# Patient Record
Sex: FEMALE | Race: AMERICAN INDIAN OR ALASKA NATIVE | NOT HISPANIC OR LATINO | Employment: FULL TIME | URBAN - METROPOLITAN AREA
[De-identification: names, ages, dates, MRNs, and addresses within clinical notes are randomized per-mention and may not be internally consistent; named-entity substitution may affect disease eponyms.]

---

## 2017-01-30 ENCOUNTER — HOSPITAL ENCOUNTER (EMERGENCY)
Facility: CLINIC | Age: 37
Discharge: HOME | End: 2017-01-30
Attending: EMERGENCY MEDICINE | Admitting: EMERGENCY MEDICINE
Payer: COMMERCIAL

## 2017-01-30 VITALS
HEART RATE: 71 BPM | DIASTOLIC BLOOD PRESSURE: 98 MMHG | TEMPERATURE: 97.8 F | SYSTOLIC BLOOD PRESSURE: 137 MMHG | RESPIRATION RATE: 16 BRPM | OXYGEN SATURATION: 97 %

## 2017-01-30 DIAGNOSIS — S05.8X1A SUPERFICIAL INJURY OF CORNEA, RIGHT, INITIAL ENCOUNTER: ICD-10-CM

## 2017-01-30 PROCEDURE — 99283 EMERGENCY DEPT VISIT LOW MDM: CPT | Performed by: EMERGENCY MEDICINE

## 2017-01-30 PROCEDURE — 25000125 ZZHC RX 250

## 2017-01-30 PROCEDURE — 25000132 ZZH RX MED GY IP 250 OP 250 PS 637: Performed by: EMERGENCY MEDICINE

## 2017-01-30 PROCEDURE — 99283 EMERGENCY DEPT VISIT LOW MDM: CPT | Mod: Z6 | Performed by: EMERGENCY MEDICINE

## 2017-01-30 RX ORDER — PROPARACAINE HYDROCHLORIDE 5 MG/ML
SOLUTION/ DROPS OPHTHALMIC
Status: COMPLETED
Start: 2017-01-30 | End: 2017-01-30

## 2017-01-30 RX ORDER — HYDROCODONE BITARTRATE AND ACETAMINOPHEN 5; 325 MG/1; MG/1
1-2 TABLET ORAL EVERY 4 HOURS PRN
Qty: 15 TABLET | Refills: 0 | Status: SHIPPED | OUTPATIENT
Start: 2017-01-30 | End: 2017-08-11

## 2017-01-30 RX ORDER — IBUPROFEN 600 MG/1
600 TABLET, FILM COATED ORAL EVERY 6 HOURS PRN
Qty: 20 TABLET | Refills: 0 | Status: SHIPPED | OUTPATIENT
Start: 2017-01-30 | End: 2017-12-19

## 2017-01-30 RX ORDER — HYDROCODONE BITARTRATE AND ACETAMINOPHEN 5; 325 MG/1; MG/1
2 TABLET ORAL ONCE
Status: COMPLETED | OUTPATIENT
Start: 2017-01-30 | End: 2017-01-30

## 2017-01-30 RX ORDER — NEOMYCIN SULFATE, POLYMYXIN B SULFATE, BACITRACIN ZINC, HYDROCORTISONE 3.5; 10000; 400; 1 MG/G; [USP'U]/G; [USP'U]/G; MG/G
OINTMENT OPHTHALMIC 3 TIMES DAILY
Qty: 10 G | Refills: 0 | Status: SHIPPED | OUTPATIENT
Start: 2017-01-30 | End: 2017-04-06

## 2017-01-30 RX ORDER — IBUPROFEN 600 MG/1
600 TABLET, FILM COATED ORAL ONCE
Status: COMPLETED | OUTPATIENT
Start: 2017-01-30 | End: 2017-01-30

## 2017-01-30 RX ADMIN — IBUPROFEN 600 MG: 600 TABLET ORAL at 12:53

## 2017-01-30 RX ADMIN — FLUORESCEIN SODIUM: 1 STRIP OPHTHALMIC at 08:48

## 2017-01-30 RX ADMIN — HYDROCODONE BITARTRATE AND ACETAMINOPHEN 2 TABLET: 5; 325 TABLET ORAL at 09:42

## 2017-01-30 RX ADMIN — PROPARACAINE HYDROCHLORIDE: 5 SOLUTION/ DROPS OPHTHALMIC at 08:48

## 2017-01-30 ASSESSMENT — ENCOUNTER SYMPTOMS
DIFFICULTY URINATING: 0
ABDOMINAL PAIN: 0
EYE REDNESS: 1
SHORTNESS OF BREATH: 0
FEVER: 0
ARTHRALGIAS: 0
NECK STIFFNESS: 0
HEADACHES: 0
EYE PAIN: 1
CONFUSION: 0
COLOR CHANGE: 0

## 2017-01-30 ASSESSMENT — VISUAL ACUITY
OS: 20/15
OD: 20/70

## 2017-01-30 NOTE — ED AVS SNAPSHOT
[facility], Emergency Department    [address]    [facility] [address]    Phone:  [phone]    Fax:  [phone]                                       Eliana Anders   MRN: [id number]    Department:  [facility], Emergency Department   Date of Visit:  1/30/2017           Patient Information     Date Of Birth          1980        Your diagnoses for this visit were:     Superficial injury of cornea, right, initial encounter        You were seen by Darryn Dominguez MD.        Discharge Instructions         Corneal Injury  An eye injury can hurt your cornea. Your cornea is the clear layer on the front of your eye. It protects your eye from dust and germs, and helps filter out harmful UV (ultraviolet) rays. The cornea also helps to focus light entering your eye. Your cornea is made of strong proteins, but it can be damaged. A slight cut or scratch (abrasion) to the cornea can be very painful. But that is often minor and can heal within 1 or 2 days. A bad abrasion or a hole (puncture) in the cornea can be very serious. These are medical emergencies.     A blow to the eye can cause damage to the cornea (front layer of the eye).   Something in your eye  If you think you have something small in your eye, flush it with water right away. Pull your upper lid out and over your bottom lid. This will help increase the flow of tears across your eye. If these methods don t work, call your healthcare provider. Never try to remove an object from your eye that doesn t flush out easily with water. Doing so may cause more damage.  When to go to the emergency room (ER)  Call 911 or your local emergency number if you have:    Severe eye pain    A puncture injury or bad abrasion    Something in your eye that you can t flush out with water    A very swollen or painful eye after removing an object    A chemical burn    An object embedded in your eye. (Cover both eyes with a sterile compress and keep both  eyes closed while you wait for help. DO NOT put any pressure on your eyes.)  What to expect in the ER  For minor abrasions   Minor abrasions are usually treated with eye drops or ointment. You may be given antibiotics to prevent infection. Most abrasions heal in 1 or 2 days. To help rule out more serious injuries, you may have tests including:    A standard eye exam to check how well you can see    A Eric test, which uses a special dye to look for severe eye damage  Depending on the results of these tests, you may be referred to an eye specialist (ophthalmologist).  For serious abrasions or punctures  You will be referred directly to an ophthalmologist for emergency treatment. An eye specialist is needed to reduce further damage and possible vision loss.    3961-2305 [employer]. [address] [place] [address]. All rights reserved. This information is not intended as a substitute for professional medical care. Always follow your healthcare professional's instructions.      Please make an appointment to follow up with Eye Clinic (phone: [phone]) in next days even if entirely better.      Future Appointments        Provider Department Dept Phone Center    1/31/2017 8:20 AM José Self [facility] Ophthalmology [phone] [facility]      24 Hour Appointment Hotline       To make an appointment at any [facility], call [phone] ([phone]). If you don't have a family doctor or clinic, we will help you find one. [facility] clinics are conveniently located to serve the needs of you and your family.             Review of your medicines      START taking        Dose / Directions Last dose taken    ibuprofen 600 MG tablet   Commonly known as:  ADVIL/MOTRIN   Dose:  600 mg   Quantity:  20 tablet        Take 1 tablet (600 mg) by mouth every 6 hours as needed for pain   Refills:  0        neomycin-bacitracin-polymyxin-hydrocortisone 1 % ophthalmic ointment   Commonly known as:   CORTISPORIN   Quantity:  10 g        Place into the right eye 3 times daily   Refills:  0          Our records show that you are taking the medicines listed below. If these are incorrect, please call your family doctor or clinic.        Dose / Directions Last dose taken    ALEVE PO        Take by mouth daily as needed for moderate pain   Refills:  0        HYDROcodone-acetaminophen 5-325 MG per tablet   Commonly known as:  NORCO   Dose:  1-2 tablet   Quantity:  15 tablet        Take 1-2 tablets by mouth every 4 hours as needed for moderate to severe pain   Refills:  0        MIRENA (52 MG) 20 MCG/24HR IUD   Quantity:  1 Intra Uterine Device   Generic drug:  levonorgestrel        intrauterine uterine device placed   Refills:  0        traMADol 50 MG tablet   Commonly known as:  ULTRAM   Dose:  50 mg   Quantity:  60 tablet        Take 1 tablet (50 mg) by mouth every 6 hours as needed for moderate pain   Refills:  0                Prescriptions were sent or printed at these locations (3 Prescriptions)                   Other Prescriptions                Printed at Department/Unit printer (3 of 3)         neomycin-bacitracin-polymyxin-hydrocortisone (CORTISPORIN) 1 % ophthalmic ointment               HYDROcodone-acetaminophen (NORCO) 5-325 MG per tablet               ibuprofen (ADVIL/MOTRIN) 600 MG tablet                Orders Needing Specimen Collection     None      Pending Results     No orders found from 1/29/2017 to 1/31/2017.            Pending Culture Results     No orders found from 1/29/2017 to 1/31/2017.            Thank you for choosing [facility]       Thank you for choosing [facility] for your care. Our goal is always to provide you with excellent care. Hearing back from our patients is one way we can continue to improve our services. Please take a few minutes to complete the written survey that you may receive in the mail after you visit with us. Thank you!        [employer] Information     [employer] gives you  secure access to your electronic health record. If you see a primary care provider, you can also send messages to your care team and make appointments. If you have questions, please call your primary care clinic.  If you do not have a primary care provider, please call [phone] and they will assist you.        Care EveryWhere ID     This is your Care EveryWhere ID. This could be used by other organizations to access your [facility] medical records  [id number]        After Visit Summary       This is your record. Keep this with you and show to your community pharmacist(s) and doctor(s) at your next visit.

## 2017-01-30 NOTE — ED AVS SNAPSHOT
[facility] [place], Emergency Department    [address]    [facility] [address]    Phone:  [phone]    Fax:  [phone]                                       Eliana Anders   MRN: [id number]    Department:  [facility], Emergency Department   Date of Visit:  1/30/2017           After Visit Summary Signature Page     I have received my discharge instructions, and my questions have been answered. I have discussed any challenges I see with this plan with the nurse or doctor.    ..........................................................................................................................................  Patient/Patient Representative Signature      ..........................................................................................................................................  Patient Representative Print Name and Relationship to Patient    ..................................................               ................................................  Date                                            Time    ..........................................................................................................................................  Reviewed by Signature/Title    ...................................................              ..............................................  Date                                                            Time

## 2017-01-30 NOTE — ED PROVIDER NOTES
History     Chief Complaint   Patient presents with     Eye Pain     Woke up this am with severe R eye pain.  Eye is dry and swollen.  Pt is crying     HPI  Eliana Anders is a 36 year old female who presents for evaluation of severe right eye pain.  Patient states that she woke this morning with right eye pain.  She tried over-the-counter eye redness reducing drops made by [employer] without relief.  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  She is unaware if she had any type of eye exposure to chemicals.  She states that she does not wear contact lens and she has slight decrease in visual acuity out of this eye.  She denies previous ocular history or other ongoing symptoms.    I have reviewed the Medications, Allergies, Past Medical and Surgical History, and Social History in the Epic system.    Review of Systems   Constitutional: Negative for fever.   HENT: Negative for congestion.    Eyes: Positive for pain, redness and visual disturbance.   Respiratory: Negative for shortness of breath.    Cardiovascular: Negative for chest pain.   Gastrointestinal: Negative for abdominal pain.   Genitourinary: Negative for difficulty urinating.   Musculoskeletal: Negative for arthralgias and neck stiffness.   Skin: Negative for color change.   Neurological: Negative for headaches.   Psychiatric/Behavioral: Negative for confusion.       Physical Exam   BP: (!) 157/115 mmHg  Pulse: 75  Temp: 97.8  F (36.6  C)  Resp: 16  SpO2: 99 %  Physical Exam   Eyes: Right conjunctiva is injected. Right conjunctiva has no hemorrhage. Right eye exhibits normal extraocular motion and no nystagmus. Right pupil is round and reactive.   Slit lamp exam:       The right eye shows fluorescein uptake. The right eye shows no anterior chamber bulge.        The left eye shows no fluorescein uptake.           ED Course     Procedures        Proparacaine and fluorescein was  used  Labs Ordered and Resulted from Time of ED Arrival Up to the Time of Departure from the ED - No data to display    Assessments & Plan (with Medical Decision Making)   36-year-old female presents for evaluation right eye pain.  She is found to have a corneal defect which includes the visual axis in the right eye.  Significant pain relief was obtained using proparacaine.  The eye was copiously irrigated and the case was discussed with ophthalmology.  The patient will follow-up in their clinic in the next 24 hours for further evaluation and management.  Patient was given Vicodin in the emergency room as well as a prescription for this and and anti-biotic ointment to be used 3 times a day.  She was also instructed to avoid touching or rubbing her eye.    I have reviewed the nursing notes.    I have reviewed the findings, diagnosis, plan and need for follow up with the patient.    New Prescriptions    NEOMYCIN-BACITRACIN-POLYMYXIN-HYDROCORTISONE (CORTISPORIN) 1 % OPHTHALMIC OINTMENT    Place into the right eye 3 times daily       Final diagnoses:   Superficial injury of cornea, right, initial encounter       1/30/2017   [facility], [place], EMERGENCY DEPARTMENT      Darryn Dominguez MD  01/30/17 2714

## 2017-01-30 NOTE — DISCHARGE INSTRUCTIONS
Corneal Injury  An eye injury can hurt your cornea. Your cornea is the clear layer on the front of your eye. It protects your eye from dust and germs, and helps filter out harmful UV (ultraviolet) rays. The cornea also helps to focus light entering your eye. Your cornea is made of strong proteins, but it can be damaged. A slight cut or scratch (abrasion) to the cornea can be very painful. But that is often minor and can heal within 1 or 2 days. A bad abrasion or a hole (puncture) in the cornea can be very serious. These are medical emergencies.     A blow to the eye can cause damage to the cornea (front layer of the eye).   Something in your eye  If you think you have something small in your eye, flush it with water right away. Pull your upper lid out and over your bottom lid. This will help increase the flow of tears across your eye. If these methods don t work, call your healthcare provider. Never try to remove an object from your eye that doesn t flush out easily with water. Doing so may cause more damage.  When to go to the emergency room (ER)  Call 911 or your local emergency number if you have:    Severe eye pain    A puncture injury or bad abrasion    Something in your eye that you can t flush out with water    A very swollen or painful eye after removing an object    A chemical burn    An object embedded in your eye. (Cover both eyes with a sterile compress and keep both eyes closed while you wait for help. DO NOT put any pressure on your eyes.)  What to expect in the ER  For minor abrasions   Minor abrasions are usually treated with eye drops or ointment. You may be given antibiotics to prevent infection. Most abrasions heal in 1 or 2 days. To help rule out more serious injuries, you may have tests including:    A standard eye exam to check how well you can see    A Eric test, which uses a special dye to look for severe eye damage  Depending on the results of these tests, you may be referred to an eye  specialist (ophthalmologist).  For serious abrasions or punctures  You will be referred directly to an ophthalmologist for emergency treatment. An eye specialist is needed to reduce further damage and possible vision loss.    8017-9961 [employer]. [address] [place] [address], [place] [address]. All rights reserved. This information is not intended as a substitute for professional medical care. Always follow your healthcare professional's instructions.      Please make an appointment to follow up with Eye Clinic (phone: [phone]) in next days even if entirely better.

## 2017-01-31 ENCOUNTER — OFFICE VISIT (OUTPATIENT)
Dept: OPHTHALMOLOGY | Facility: CLINIC | Age: 37
End: 2017-01-31

## 2017-01-31 DIAGNOSIS — H18.831 RECURRENT EROSION OF CORNEA, RIGHT: Primary | ICD-10-CM

## 2017-01-31 RX ORDER — SODIUM CHLORIDE 5 %
OINTMENT (GRAM) OPHTHALMIC (EYE)
Qty: 1 TUBE | Refills: 1 | Status: SHIPPED | OUTPATIENT
Start: 2017-01-31 | End: 2017-04-06

## 2017-01-31 ASSESSMENT — CONF VISUAL FIELD
OS_NORMAL: 1
OD_NORMAL: 1
METHOD: COUNTING FINGERS

## 2017-01-31 ASSESSMENT — EXTERNAL EXAM - LEFT EYE: OS_EXAM: NORMAL

## 2017-01-31 ASSESSMENT — VISUAL ACUITY
OD_SC+: -3
METHOD: SNELLEN - LINEAR
OD_SC: 20/20
OS_SC: 20/20

## 2017-01-31 ASSESSMENT — TONOMETRY
IOP_METHOD: ICARE
OS_IOP_MMHG: 13
OD_IOP_MMHG: 10

## 2017-01-31 ASSESSMENT — SLIT LAMP EXAM - LIDS
COMMENTS: NORMAL
COMMENTS: NORMAL

## 2017-01-31 ASSESSMENT — CUP TO DISC RATIO
OD_RATIO: 0.3
OS_RATIO: 0.3

## 2017-01-31 ASSESSMENT — EXTERNAL EXAM - RIGHT EYE: OD_EXAM: NORMAL

## 2017-01-31 NOTE — MR AVS SNAPSHOT
After Visit Summary   1/31/2017    Eliana Anders    MRN: [id number]           Patient Information     Date Of Birth          1980        Visit Information        Provider Department      1/31/2017 8:20 AM José Self OD [facility] Ophthalmology        Today's Diagnoses     Recurrent erosion of cornea, right    -  1        Follow-ups after your visit        Follow-up notes from your care team     Return in about 3 days (around 2/3/2017) for Follow Up.      Your next 10 appointments already scheduled     Feb 03, 2017  1:20 PM   (Arrive by 1:05 PM)   RETURN GENERAL with SIMON Wheat [facility] Ophthalmology ([facility])    [address] [facility] [address] [phone]              Who to contact     Please call your clinic at [phone] to:    Ask questions about your health    Make or cancel appointments    Discuss your medicines    Learn about your test results    Speak to your doctor   If you have compliments or concerns about an experience at your clinic, or if you wish to file a complaint, please contact [facility] Patient Relations at [phone] or email us at [email or web address]         Additional Information About Your Visit        MyChart Information     [employer] gives you secure access to your electronic health record. If you see a primary care provider, you can also send messages to your care team and make appointments. If you have questions, please call your primary care clinic.  If you do not have a primary care provider, please call [phone] and they will assist you.      [employer] is an electronic gateway that provides easy, online access to your medical records. With [employer], you can request a clinic appointment, read your test results, renew a prescription or communicate with your care team.     To access your existing account, please contact your [facility] or call [phone] for assistance.        Care EveryWhere ID     This is your Care EveryWhere ID. This could be used by other organizations to access your [facility] medical records  [id number]         Blood Pressure from Last 3 Encounters:   01/30/17 137/98   11/29/16 139/89   11/26/16 133/83    Weight from Last 3 Encounters:   11/29/16 95.255 kg (210 lb)   04/15/16 98.158 kg (216 lb 6.4 oz)   11/24/15 95.255 kg (210 lb)              Today, you had the following     No orders found for display         Today's Medication Changes          These changes are accurate as of: 1/31/17  4:19 PM.  If you have any questions, ask your nurse or doctor.               Start taking these medicines.        Dose/Directions    sodium chloride 5 % ophthalmic ointment   Commonly known as:  NATE 128   Used for:  Recurrent erosion of cornea, right   Started by:  José Self, OD        Apply 1/4 inch strip to lower lid of the right eye at bedtime each night   Quantity:  1 Tube   Refills:  1            Where to get your medicines      These medications were sent to [facility] - [address] AT [address] [address]     Phone:  [phone]    - sodium chloride 5 % ophthalmic ointment             Primary Care Provider Office Phone # Fax #    [facility] [phone]       [address] [address]        Thank you!     Thank you for choosing [facility] OPHTHALMOLOGY  for your care. Our goal is always to provide you with excellent care. Hearing back from our patients is one way we can continue to improve our services. Please take a few minutes to complete the written survey that you may receive in the mail after your visit with us. Thank you!             Your Updated Medication List - Protect others around you: Learn how to safely use, store and throw away your medicines at  www.disposemymeds.org.          This list is accurate as of: 1/31/17  4:19 PM.  Always use your most recent med list.                   Brand Name Dispense Instructions for use    ALEVE PO      Take by mouth daily as needed for moderate pain       HYDROcodone-acetaminophen 5-325 MG per tablet    NORCO    15 tablet    Take 1-2 tablets by mouth every 4 hours as needed for moderate to severe pain       ibuprofen 600 MG tablet    ADVIL/MOTRIN    20 tablet    Take 1 tablet (600 mg) by mouth every 6 hours as needed for pain       MIRENA (52 MG) 20 MCG/24HR IUD   Generic drug:  levonorgestrel     1 Intra Uterine Device    intrauterine uterine device placed       neomycin-bacitracin-polymyxin-hydrocortisone 1 % ophthalmic ointment    CORTISPORIN    10 g    Place into the right eye 3 times daily       sodium chloride 5 % ophthalmic ointment    NATE 128    1 Tube    Apply 1/4 inch strip to lower lid of the right eye at bedtime each night       traMADol 50 MG tablet    ULTRAM    60 tablet    Take 1 tablet (50 mg) by mouth every 6 hours as needed for moderate pain

## 2017-01-31 NOTE — NURSING NOTE
Chief Complaints and History of Present Illnesses   Patient presents with     Consult For     Right eye corneal abrasion     HPI    Affected eye(s):  Right   Symptoms:     Blurred vision      Frequency:  Constant       Do you have eye pain now?:  No      Comments:  Pt states woke up with pain on the right eye yesterday- unable to open right eye because of the pain yesterday. Pt currently has a patch over the right eye today. Was prescribed Cortisporin to be used 3x/ day in the right eye.     Roma NICHOLAS 8:18 AM January 31, 2017

## 2017-01-31 NOTE — PROGRESS NOTES
Assessment/Plan  1. 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 Given history of ocular trauma, it is possible that there is a history of corneal abrasion. Discontinue antibiotic/steroid drop as defect is closed. Prescribed Mani 128 jennifer qhs OD, and recommended preservative free tears q1h OD. Return to clinic in 3 days to check for resolution and rule out herpetic keratitis, given branching appearance of defect.    Complete documentation of historical and exam elements from today's encounter can  be found in the full encounter summary report (not reduplicated in this progress  note). I personally obtained the chief complaint(s) and history of present illness. I  confirmed and edited as necessary the review of systems, past medical/surgical  history, family history, social history, and examination findings as documented by  others; and I examined the patient myself. I personally reviewed the relevant tests,  images, and reports as documented above. I formulated and edited as necessary the  assessment and plan and discussed the findings and management plan with the  patient and family.    José Self, OD, FAAO

## 2017-02-03 ENCOUNTER — OFFICE VISIT (OUTPATIENT)
Dept: OPHTHALMOLOGY | Facility: CLINIC | Age: 37
End: 2017-02-03

## 2017-02-03 DIAGNOSIS — H18.831 RECURRENT EROSION OF CORNEA, RIGHT: Primary | ICD-10-CM

## 2017-02-03 ASSESSMENT — VISUAL ACUITY
METHOD: SNELLEN - LINEAR
OD_SC+: -2
OD_SC: 20/20
OS_SC+: -3
OS_SC: 20/20

## 2017-02-03 ASSESSMENT — TONOMETRY
OD_IOP_MMHG: 15
IOP_METHOD: ICARE
OS_IOP_MMHG: 13

## 2017-02-03 ASSESSMENT — EXTERNAL EXAM - RIGHT EYE: OD_EXAM: NORMAL

## 2017-02-03 ASSESSMENT — CONF VISUAL FIELD
OD_NORMAL: 1
OS_NORMAL: 1

## 2017-02-03 ASSESSMENT — SLIT LAMP EXAM - LIDS
COMMENTS: NORMAL
COMMENTS: NORMAL

## 2017-02-03 ASSESSMENT — EXTERNAL EXAM - LEFT EYE: OS_EXAM: NORMAL

## 2017-02-03 NOTE — PROGRESS NOTES
Assessment/Plan  1. Recurrent corneal erosion OD- improving   Educated patient on clinical findings. Continue use of artificial tears (decrease to qid OD) and Mani prn at bedtime. Return to clinic in 1 month for dilation and refraction.  No evidence of herpetic keratitis given findings today.    Complete documentation of historical and exam elements from today's encounter can  be found in the full encounter summary report (not reduplicated in this progress  note). I personally obtained the chief complaint(s) and history of present illness. I  confirmed and edited as necessary the review of systems, past medical/surgical  history, family history, social history, and examination findings as documented by  others; and I examined the patient myself. I personally reviewed the relevant tests,  images, and reports as documented above. I formulated and edited as necessary the  assessment and plan and discussed the findings and management plan with the  patient and family.    José Self, OD, FAAO

## 2017-02-03 NOTE — MR AVS SNAPSHOT
After Visit Summary   2/3/2017    Eliana Anders    MRN: [id number]           Patient Information     Date Of Birth          1980        Visit Information        Provider Department      2/3/2017 1:20 PM José Self OD M [facility] Ophthalmology         Follow-ups after your visit        Your next 10 appointments already scheduled     Mar 07, 2017  2:20 PM   (Arrive by 2:05 PM)   RETURN GENERAL with SIMON Wheat [facility] ([facility] [place])    [address] [place] [address] [phone]              Who to contact     Please call your clinic at [phone] to:    Ask questions about your health    Make or cancel appointments    Discuss your medicines    Learn about your test results    Speak to your doctor   If you have compliments or concerns about an experience at your clinic, or if you wish to file a complaint, please contact [facility] Patient Relations at [phone] or email us at [email or web address]         Additional Information About Your Visit        MyChart Information     [employer] gives you secure access to your electronic health record. If you see a primary care provider, you can also send messages to your care team and make appointments. If you have questions, please call your primary care clinic.  If you do not have a primary care provider, please call [phone] and they will assist you.      FightMe is an electronic gateway that provides easy, online access to your medical records. With [employer]Me, you can request a clinic appointment, read your test results, renew a prescription or communicate with your care team.     To access your existing account, please contact your [facility] or call [phone] for assistance.        Care EveryWhere ID     This is your Care EveryWhere ID. This could be used by other organizations to  access your [place] medical records  [id number]         Blood Pressure from Last 3 Encounters:   01/30/17 137/98   11/29/16 139/89   11/26/16 133/83    Weight from Last 3 Encounters:   11/29/16 95.255 kg (210 lb)   04/15/16 98.158 kg (216 lb 6.4 oz)   11/24/15 95.255 kg (210 lb)              Today, you had the following     No orders found for display       Primary Care Provider Office Phone # Fax #    [facility] [phone]       [address] [place] [address]        Thank you!     Thank you for choosing [facility] OPHTHALMOLOGY  for your care. Our goal is always to provide you with excellent care. Hearing back from our patients is one way we can continue to improve our services. Please take a few minutes to complete the written survey that you may receive in the mail after your visit with us. Thank you!             Your Updated Medication List - Protect others around you: Learn how to safely use, store and throw away your medicines at www.disposemymeds.org.          This list is accurate as of: 2/3/17  1:41 PM.  Always use your most recent med list.                   Brand Name Dispense Instructions for use    ALEVE PO      Take by mouth daily as needed for moderate pain       HYDROcodone-acetaminophen 5-325 MG per tablet    NORCO    15 tablet    Take 1-2 tablets by mouth every 4 hours as needed for moderate to severe pain       ibuprofen 600 MG tablet    ADVIL/MOTRIN    20 tablet    Take 1 tablet (600 mg) by mouth every 6 hours as needed for pain       MIRENA (52 MG) 20 MCG/24HR IUD   Generic drug:  levonorgestrel     1 Intra Uterine Device    intrauterine uterine device placed       neomycin-bacitracin-polymyxin-hydrocortisone 1 % ophthalmic ointment    CORTISPORIN    10 g    Place into the right eye 3 times daily       sodium chloride 5 % ophthalmic ointment    NATE 128    1 Tube    Apply 1/4 inch strip to lower lid of the right eye at bedtime each night        traMADol 50 MG tablet    ULTRAM    60 tablet    Take 1 tablet (50 mg) by mouth every 6 hours as needed for moderate pain

## 2017-03-10 ENCOUNTER — OFFICE VISIT (OUTPATIENT)
Dept: OPHTHALMOLOGY | Facility: CLINIC | Age: 37
End: 2017-03-10

## 2017-03-10 DIAGNOSIS — H52.223 ASTIGMATISM, REGULAR, BILATERAL: Primary | ICD-10-CM

## 2017-03-10 DIAGNOSIS — H18.831 RECURRENT EROSION OF CORNEA, RIGHT: ICD-10-CM

## 2017-03-10 ASSESSMENT — REFRACTION_MANIFEST
OS_CYLINDER: +0.50
OS_SPHERE: -0.75
OD_CYLINDER: +0.50
OD_AXIS: 039
OS_AXIS: 102
METHOD_AUTOREFRACTION: 1
OD_SPHERE: +0.25

## 2017-03-10 ASSESSMENT — SLIT LAMP EXAM - LIDS
COMMENTS: NORMAL
COMMENTS: NORMAL

## 2017-03-10 ASSESSMENT — EXTERNAL EXAM - RIGHT EYE: OD_EXAM: NORMAL

## 2017-03-10 ASSESSMENT — TONOMETRY
OS_IOP_MMHG: 13
OD_IOP_MMHG: 17
IOP_METHOD: ICARE

## 2017-03-10 ASSESSMENT — VISUAL ACUITY
OD_SC: J1+
OD_SC: 20/15
METHOD: SNELLEN - LINEAR
OS_SC+: -1
OS_SC: 20/15
OS_SC: J1+
OD_SC+: -1

## 2017-03-10 ASSESSMENT — CONF VISUAL FIELD
OS_NORMAL: 1
OD_NORMAL: 1

## 2017-03-10 ASSESSMENT — EXTERNAL EXAM - LEFT EYE: OS_EXAM: NORMAL

## 2017-03-10 ASSESSMENT — CUP TO DISC RATIO
OS_RATIO: 0.2
OD_RATIO: 0.2

## 2017-03-10 NOTE — NURSING NOTE
Chief Complaints and History of Present Illnesses   Patient presents with     Follow Up For     HPI    Affected eye(s):  Both   Symptoms:     No blurred vision   No floaters   No flashes   No redness   No tearing   No Dryness   No itching   No photophobia         Do you have eye pain now?:  No      Comments:  Patient notes everything feels pretty good    Patient denies jennifer or luigis    Marisol Patton March 10, 2017 2:15 PM

## 2017-03-10 NOTE — MR AVS SNAPSHOT
After Visit Summary   3/10/2017    Eliana Anders    MRN: [id number]           Patient Information     Date Of Birth          1980        Visit Information        Provider Department      3/10/2017 2:20 PM José Self OD M [facility] Ophthalmology        Today's Diagnoses     Astigmatism, regular, bilateral    -  1    Recurrent erosion of cornea, right           Follow-ups after your visit        Follow-up notes from your care team     Return in about 1 year (around 3/10/2018) for Annual Visit.      Who to contact     Please call your clinic at [phone] to:    Ask questions about your health    Make or cancel appointments    Discuss your medicines    Learn about your test results    Speak to your doctor   If you have compliments or concerns about an experience at your clinic, or if you wish to file a complaint, please contact [facility] Patient Relations at [phone] or email us at [email or web address]         Additional Information About Your Visit        MyChart Information     [employer] gives you secure access to your electronic health record. If you see a primary care provider, you can also send messages to your care team and make appointments. If you have questions, please call your primary care clinic.  If you do not have a primary care provider, please call [phone] and they will assist you.      [employer] is an electronic gateway that provides easy, online access to your medical records. With [employer], you can request a clinic appointment, read your test results, renew a prescription or communicate with your care team.     To access your existing account, please contact your [facility] or call [phone] for assistance.        Care EveryWhere ID     This is your Care EveryWhere ID. This could be used by other organizations to access your [place] medical records  [id number]         Blood  Pressure from Last 3 Encounters:   01/30/17 (!) 137/98   11/29/16 139/89   11/26/16 133/83    Weight from Last 3 Encounters:   11/29/16 95.3 kg (210 lb)   04/15/16 98.2 kg (216 lb 6.4 oz)   11/24/15 95.3 kg (210 lb)              We Performed the Following     Refraction        Primary Care Provider Office Phone # Fax #    [facility] [phone]       [address] [place] [address]        Thank you!     Thank you for choosing [facility] OPHTHALMOLOGY  for your care. Our goal is always to provide you with excellent care. Hearing back from our patients is one way we can continue to improve our services. Please take a few minutes to complete the written survey that you may receive in the mail after your visit with us. Thank you!             Your Updated Medication List - Protect others around you: Learn how to safely use, store and throw away your medicines at www.disposemymeds.org.          This list is accurate as of: 3/10/17  3:40 PM.  Always use your most recent med list.                   Brand Name Dispense Instructions for use    ALEVE PO      Take by mouth daily as needed for moderate pain       HYDROcodone-acetaminophen 5-325 MG per tablet    NORCO    15 tablet    Take 1-2 tablets by mouth every 4 hours as needed for moderate to severe pain       ibuprofen 600 MG tablet    ADVIL/MOTRIN    20 tablet    Take 1 tablet (600 mg) by mouth every 6 hours as needed for pain       MIRENA (52 MG) 20 MCG/24HR IUD   Generic drug:  levonorgestrel     1 Intra Uterine Device    intrauterine uterine device placed       neomycin-bacitracin-polymyxin-hydrocortisone 1 % ophthalmic ointment    CORTISPORIN    10 g    Place into the right eye 3 times daily       sodium chloride 5 % ophthalmic ointment    NATE 128    1 Tube    Apply 1/4 inch strip to lower lid of the right eye at bedtime each night       traMADol 50 MG tablet    ULTRAM    60 tablet    Take 1 tablet (50 mg) by mouth every 6  hours as needed for moderate pain

## 2017-03-10 NOTE — PROGRESS NOTES
Assessment/Plan  (H52.223) Astigmatism, regular, bilateral  (primary encounter diagnosis)  Comment: Compound hyperopic astigmatism OD, mixed astigmatism OS  Plan: Refraction   Educated patient on clinical findings. No spectacle prescription needed at this time. Monitor annually.    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 No evidence of epithelial defect at this time. Continue use of artificial tears as needed.      Complete documentation of historical and exam elements from today's encounter can  be found in the full encounter summary report (not reduplicated in this progress  note). I personally obtained the chief complaint(s) and history of present illness. I  confirmed and edited as necessary the review of systems, past medical/surgical  history, family history, social history, and examination findings as documented by  others; and I examined the patient myself. I personally reviewed the relevant tests,  images, and reports as documented above. I formulated and edited as necessary the  assessment and plan and discussed the findings and management plan with the  patient and family.    José Self, OD, FAAO

## 2017-04-06 ENCOUNTER — HOSPITAL ENCOUNTER (EMERGENCY)
Facility: CLINIC | Age: 37
Discharge: HOME | End: 2017-04-06
Attending: EMERGENCY MEDICINE | Admitting: EMERGENCY MEDICINE
Payer: COMMERCIAL

## 2017-04-06 ENCOUNTER — TELEPHONE (OUTPATIENT)
Dept: ORTHOPEDICS | Facility: CLINIC | Age: 37
End: 2017-04-06

## 2017-04-06 ENCOUNTER — APPOINTMENT (OUTPATIENT)
Dept: ULTRASOUND IMAGING | Facility: CLINIC | Age: 37
End: 2017-04-06
Attending: EMERGENCY MEDICINE
Payer: COMMERCIAL

## 2017-04-06 VITALS
SYSTOLIC BLOOD PRESSURE: 145 MMHG | BODY MASS INDEX: 33.9 KG/M2 | RESPIRATION RATE: 18 BRPM | WEIGHT: 216 LBS | HEART RATE: 67 BPM | OXYGEN SATURATION: 100 % | HEIGHT: 67 IN | TEMPERATURE: 97.8 F | DIASTOLIC BLOOD PRESSURE: 88 MMHG

## 2017-04-06 DIAGNOSIS — M71.21 BAKER'S CYST OF KNEE, RIGHT: ICD-10-CM

## 2017-04-06 DIAGNOSIS — S80.02XA CONTUSION OF LEFT KNEE, INITIAL ENCOUNTER: ICD-10-CM

## 2017-04-06 DIAGNOSIS — S80.02XA CONTUSION OF KNEE, LEFT: ICD-10-CM

## 2017-04-06 PROCEDURE — 99284 EMERGENCY DEPT VISIT MOD MDM: CPT | Mod: 25 | Performed by: EMERGENCY MEDICINE

## 2017-04-06 PROCEDURE — 99284 EMERGENCY DEPT VISIT MOD MDM: CPT | Mod: Z6 | Performed by: EMERGENCY MEDICINE

## 2017-04-06 PROCEDURE — 93970 EXTREMITY STUDY: CPT

## 2017-04-06 PROCEDURE — 25000132 ZZH RX MED GY IP 250 OP 250 PS 637: Performed by: EMERGENCY MEDICINE

## 2017-04-06 RX ORDER — OXYCODONE AND ACETAMINOPHEN 5; 325 MG/1; MG/1
1-2 TABLET ORAL EVERY 4 HOURS PRN
Qty: 15 TABLET | Refills: 0 | Status: SHIPPED | OUTPATIENT
Start: 2017-04-06 | End: 2017-08-29

## 2017-04-06 RX ORDER — NAPROXEN 500 MG/1
500 TABLET ORAL 2 TIMES DAILY WITH MEALS
Qty: 16 TABLET | Refills: 0 | Status: SHIPPED | OUTPATIENT
Start: 2017-04-06 | End: 2017-04-14

## 2017-04-06 RX ORDER — NAPROXEN 500 MG/1
500 TABLET ORAL ONCE
Status: COMPLETED | OUTPATIENT
Start: 2017-04-06 | End: 2017-04-06

## 2017-04-06 RX ADMIN — NAPROXEN 500 MG: 500 TABLET ORAL at 10:41

## 2017-04-06 ASSESSMENT — ENCOUNTER SYMPTOMS
DIFFICULTY URINATING: 0
ADENOPATHY: 0
COLOR CHANGE: 0
NUMBNESS: 0
BACK PAIN: 0
WEAKNESS: 0
NAUSEA: 0
POLYDIPSIA: 0
AGITATION: 0
SHORTNESS OF BREATH: 0
NECK STIFFNESS: 0
BRUISES/BLEEDS EASILY: 0
ABDOMINAL PAIN: 0
NECK PAIN: 0
CHILLS: 0
LIGHT-HEADEDNESS: 0
FEVER: 0

## 2017-04-06 NOTE — DISCHARGE INSTRUCTIONS
Please make an appointment to follow up with Orthopedics (phone: [phone]) in 5-7 days for further evaluation and recommendations.    Treatment for Baker s Cyst (Popliteal Cyst)  A Baker s cyst (popliteal cyst) is a fluid-filled sac that forms behind the knee.  Types of treatment  You likely won t need any treatment if you don t have any symptoms from your Baker s cyst. Some Baker s cysts go away without any treatment. If your cyst starts causing symptoms, you might need treatment at that time.  If you do have symptoms, you may be treated depending on the cause of your cyst. For example, you may need medicine for rheumatoid arthritis. Or you may need physical therapy for osteoarthritis.  Other treatments for a Baker s cyst can include:    Over-the-counter pain medicines    Arthrocentesis to remove extra fluid from the joint space    Steroid injection into the joint to reduce cyst size    Surgery to remove the cyst  Possible complications of a Baker s cyst  In rare cases, a Baker s cyst may cause complications. The cyst may get larger, which may cause redness and swelling. The cyst may also rupture, causing warmth, redness, and pain in your calf.  The symptoms may be the same as a blood clot in the veins of the legs. Your health care provider may need imaging tests of your leg to make sure you don t have a clot. Rupture can also lead to its own complications, such as:    Trapping of a tibial nerve. This cases calf pain and numbness behind the leg. It can be treated with arthrocentesis and steroid injections.    Blockage of the popliteal artery. This causes pain and lack of blood flow to the leg. It can also be treated with arthrocentesis and steroid injections.    Compartment syndrome. This causes intense pain and problems moving the foot or toes. Compartment syndrome is a medical emergency. It needs immediate surgery. It can lead to permanent muscle damage if not treated right away.  When to call the health  care provider  If your cyst starts causing mild symptoms, plan to see your health care provider soon. See him or her right away if you have symptoms such as redness and swelling of your leg. These symptoms may mean your Baker s cyst has ruptured.        9206-1545 [employer]. [address] [place] [address]. All rights reserved. This information is not intended as a substitute for professional medical care. Always follow your healthcare professional's instructions.          Understanding Baker s Cyst (Popliteal Cyst)  A Baker s cyst (popliteal cyst) is a fluid-filled sac that forms behind the knee.  Parts of the knee  The knee is a complex joint that has many parts. The lower end of the thighbone (femur) rotates on the upper end of the shinbone (tibia). There are several small bursae around the knee joint. These are small sacs filled with a special fluid (synovial fluid) that cushions the rest of the joint. Between the bones is a space that also contains this fluid.  What causes a Baker s cyst?  A small bursa sits just below the crease at the back of your knee. When this sac fills with too much fluid, it s called a Baker s cyst. This might happen when an injury or disease causes extra synovial fluid to leak into the bursa from the joint space.  In adults, other problems with the knee joint often cause the Baker s cyst. Injury or a knee disorder can change the normal structure of the knee joint. This can cause a cyst to form.  The synovial fluid inside the joint space may build up as a result of injury or disease. As the pressure builds up, the fluid may bulge into the back of the knee. This can cause the cyst.  Symptoms of a Baker s cyst  A Baker s cyst often doesn t cause symptoms. A cyst will more often be seen on an imaging test, like MRI, done for other reasons. If you do have symptoms, they may include:    Pain in the back of the knee    Knee stiffness    Sense of swelling or fullness  behind the knee, especially when you straighten your leg    A swelling behind the knee that goes away when you bend your knee  These symptoms tend to get worse when standing for a long time, or being active.  Diagnosing a Baker s cyst  Your health care provider will ask you about your medical history and your symptoms. He or she will give you a physical exam, which will include a careful exam of your knee. It s important to make sure your symptoms are caused by a Baker s cyst and not a tumor or a blood clot.  If the cause of your symptoms is not clear, you may have imaging tests, such as:    Ultrasound, to look at the cyst in more detail    X-ray, to get more information about the bones of the joint    MRI, if the diagnosis is still unclear after ultrasound, or if your health care provider is considering surgery    6215-7338 [employer]. [address] [place] [address], [place] [address]. All rights reserved. This information is not intended as a substitute for professional medical care. Always follow your healthcare professional's instructions.          Bruises (Contusions)    A contusion is a bruise. A bruise happens when a blow to your body doesn't break the skin but does break blood vessels beneath the skin. Blood leaking from the broken vessels causes redness and swelling. As it heals, your bruise is likely to turn colors like purple, green, and yellow. This is normal. The bruise should fade in 2 or 3 weeks.  Factors that make you more likely to bruise  Almost everyone bruises now and then. Certain people do bruise more easily than others. You're more prone to bruising as you get older. That's because blood vessels become more fragile with age. You're also more likely to bruise if you have a clotting disorder such as hemophilia or take medications that reduce clotting, including aspirin.  When to go to the emergency room (ER)  Bruises almost always heal on their own without special treatment. But for some  people, a bad bruise can be serious. Seek medical care if you:    Have a clotting disorder such as hemophilia.    Have cirrhosis or other serious liver disease.    Take blood-thinning medications such as warfarin (Coumadin).  What to expect in the ER  A doctor will examine your bruise and ask about any health conditions you have. In some cases, you may have a test to check how well your blood clots. Other treatment will depend on your needs.  Follow-up care  Sometimes a bruise gets worse instead of better. It may become larger and more swollen. This can occur when your body walls off a small pool of blood under the skin (hematoma). In very rare cases, your doctor may need to drain excess blood from the area.  Tip:  Apply an ice pack or bag of frozen peas to a bruise (keep a thin cloth between the cold source and your skin). This can help reduce redness and swelling.     5814-7910 [employer]. [address] [place] [address], [place] [address]. All rights reserved. This information is not intended as a substitute for professional medical care. Always follow your healthcare professional's instructions.

## 2017-04-06 NOTE — ED AVS SNAPSHOT
[facility], [place], Emergency Department    [address]    [facility] [address]    Phone:  [phone]    Fax:  [phone]                                       Eliana Anders   MRN: [id number]    Department:  [facility], Emergency Department   Date of Visit:  4/6/2017           After Visit Summary Signature Page     I have received my discharge instructions, and my questions have been answered. I have discussed any challenges I see with this plan with the nurse or doctor.    ..........................................................................................................................................  Patient/Patient Representative Signature      ..........................................................................................................................................  Patient Representative Print Name and Relationship to Patient    ..................................................               ................................................  Date                                            Time    ..........................................................................................................................................  Reviewed by Signature/Title    ...................................................              ..............................................  Date                                                            Time

## 2017-04-06 NOTE — LETTER
[facility], [place], EMERGENCY DEPARTMENT  [address] [facility] [place] [address]  [phone]    2017    Eliana Anders  [address] [facility] [address]  [place] [address]  [phone] (home)     : 1980      To Whom it may concern:    Eliana Anders was seen in our Emergency Department today, 2017. She may return to work on April 10, 2017.    Sincerely,        Susu Keller MD

## 2017-04-06 NOTE — ED AVS SNAPSHOT
[facility], Emergency Department    [address]    [facility] [address]    Phone:  [phone]    Fax:  [phone]                                       Eliana Anders   MRN: [id number]    Department:  [facility], Emergency Department   Date of Visit:  4/6/2017           Patient Information     Date Of Birth          1980        Your diagnoses for this visit were:     Baker's cyst of knee, right     Contusion of knee, left        You were seen by Susu Keller MD.        Discharge Instructions       Please make an appointment to follow up with Orthopedics (phone: [phone]) in 5-7 days for further evaluation and recommendations.    Treatment for Baker s Cyst (Popliteal Cyst)  A Baker s cyst (popliteal cyst) is a fluid-filled sac that forms behind the knee.  Types of treatment  You likely won t need any treatment if you don t have any symptoms from your Baker s cyst. Some Baker s cysts go away without any treatment. If your cyst starts causing symptoms, you might need treatment at that time.  If you do have symptoms, you may be treated depending on the cause of your cyst. For example, you may need medicine for rheumatoid arthritis. Or you may need physical therapy for osteoarthritis.  Other treatments for a Baker s cyst can include:    Over-the-counter pain medicines    Arthrocentesis to remove extra fluid from the joint space    Steroid injection into the joint to reduce cyst size    Surgery to remove the cyst  Possible complications of a Baker s cyst  In rare cases, a Baker s cyst may cause complications. The cyst may get larger, which may cause redness and swelling. The cyst may also rupture, causing warmth, redness, and pain in your calf.  The symptoms may be the same as a blood clot in the veins of the legs. Your health care provider may need imaging tests of your leg to make sure you don t have a clot. Rupture can also lead to its own complications, such  as:    Trapping of a tibial nerve. This cases calf pain and numbness behind the leg. It can be treated with arthrocentesis and steroid injections.    Blockage of the popliteal artery. This causes pain and lack of blood flow to the leg. It can also be treated with arthrocentesis and steroid injections.    Compartment syndrome. This causes intense pain and problems moving the foot or toes. Compartment syndrome is a medical emergency. It needs immediate surgery. It can lead to permanent muscle damage if not treated right away.  When to call the health care provider  If your cyst starts causing mild symptoms, plan to see your health care provider soon. See him or her right away if you have symptoms such as redness and swelling of your leg. These symptoms may mean your Baker s cyst has ruptured.        8378-8558 [employer]. [address] [place] [address]. All rights reserved. This information is not intended as a substitute for professional medical care. Always follow your healthcare professional's instructions.          Understanding Baker s Cyst (Popliteal Cyst)  A Baker s cyst (popliteal cyst) is a fluid-filled sac that forms behind the knee.  Parts of the knee  The knee is a complex joint that has many parts. The lower end of the thighbone (femur) rotates on the upper end of the shinbone (tibia). There are several small bursae around the knee joint. These are small sacs filled with a special fluid (synovial fluid) that cushions the rest of the joint. Between the bones is a space that also contains this fluid.  What causes a Baker s cyst?  A small bursa sits just below the crease at the back of your knee. When this sac fills with too much fluid, it s called a Baker s cyst. This might happen when an injury or disease causes extra synovial fluid to leak into the bursa from the joint space.  In adults, other problems with the knee joint often cause the Baker s cyst. Injury or a knee disorder can  change the normal structure of the knee joint. This can cause a cyst to form.  The synovial fluid inside the joint space may build up as a result of injury or disease. As the pressure builds up, the fluid may bulge into the back of the knee. This can cause the cyst.  Symptoms of a Baker s cyst  A Baker s cyst often doesn t cause symptoms. A cyst will more often be seen on an imaging test, like MRI, done for other reasons. If you do have symptoms, they may include:    Pain in the back of the knee    Knee stiffness    Sense of swelling or fullness behind the knee, especially when you straighten your leg    A swelling behind the knee that goes away when you bend your knee  These symptoms tend to get worse when standing for a long time, or being active.  Diagnosing a Baker s cyst  Your health care provider will ask you about your medical history and your symptoms. He or she will give you a physical exam, which will include a careful exam of your knee. It s important to make sure your symptoms are caused by a Baker s cyst and not a tumor or a blood clot.  If the cause of your symptoms is not clear, you may have imaging tests, such as:    Ultrasound, to look at the cyst in more detail    X-ray, to get more information about the bones of the joint    MRI, if the diagnosis is still unclear after ultrasound, or if your health care provider is considering surgery    5601-7844 [employer]. [address] [place], [place] [id number]. All rights reserved. This information is not intended as a substitute for professional medical care. Always follow your healthcare professional's instructions.          Bruises (Contusions)    A contusion is a bruise. A bruise happens when a blow to your body doesn't break the skin but does break blood vessels beneath the skin. Blood leaking from the broken vessels causes redness and swelling. As it heals, your bruise is likely to turn colors like purple, green, and yellow. This is  normal. The bruise should fade in 2 or 3 weeks.  Factors that make you more likely to bruise  Almost everyone bruises now and then. Certain people do bruise more easily than others. You're more prone to bruising as you get older. That's because blood vessels become more fragile with age. You're also more likely to bruise if you have a clotting disorder such as hemophilia or take medications that reduce clotting, including aspirin.  When to go to the emergency room (ER)  Bruises almost always heal on their own without special treatment. But for some people, a bad bruise can be serious. Seek medical care if you:    Have a clotting disorder such as hemophilia.    Have cirrhosis or other serious liver disease.    Take blood-thinning medications such as warfarin (Coumadin).  What to expect in the ER  A doctor will examine your bruise and ask about any health conditions you have. In some cases, you may have a test to check how well your blood clots. Other treatment will depend on your needs.  Follow-up care  Sometimes a bruise gets worse instead of better. It may become larger and more swollen. This can occur when your body walls off a small pool of blood under the skin (hematoma). In very rare cases, your doctor may need to drain excess blood from the area.  Tip:  Apply an ice pack or bag of frozen peas to a bruise (keep a thin cloth between the cold source and your skin). This can help reduce redness and swelling.     9267-2144 [employer]. [address] [place] [address]. All rights reserved. This information is not intended as a substitute for professional medical care. Always follow your healthcare professional's instructions.            24 Hour Appointment Hotline       To make an appointment at any [facility], call [phone] ([phone]). If you don't have a family doctor or clinic, we will help you find one. [facility] clinics are conveniently located to serve the needs of you  and your family.          ED Discharge Orders     ORTHO  REFERRAL       [facility] is referring you to the Orthopedic  Services at [facility].       The  Representative will assist you in the coordination of your Orthopedic and Musculoskeletal Care as prescribed by your physician.    The  Representative will call you within 1 business day to help schedule your appointment, or you may contact the  Representative at:    All areas ~ [phone]     Type of Referral : Surgical / Specialist       Timeframe requested: 3 - 5 days    Coverage of these services is subject to the terms and limitations of your health insurance plan.  Please call member services at your health plan with any benefit or coverage questions.      If X-rays, CT or MRI's have been performed, please contact the facility where they were done to arrange for , prior to your scheduled appointment.  Please bring this referral request to your appointment and present it to your specialist.                     Review of your medicines      START taking        Dose / Directions Last dose taken    naproxen 500 MG tablet   Commonly known as:  NAPROSYN   Dose:  500 mg   Quantity:  16 tablet        Take 1 tablet (500 mg) by mouth 2 times daily (with meals) for 8 days   Refills:  0        oxyCODONE-acetaminophen 5-325 MG per tablet   Commonly known as:  PERCOCET   Dose:  1-2 tablet   Quantity:  15 tablet        Take 1-2 tablets by mouth every 4 hours as needed for pain   Refills:  0          Our records show that you are taking the medicines listed below. If these are incorrect, please call your family doctor or clinic.        Dose / Directions Last dose taken    ALEVE PO        Take by mouth daily as needed for moderate pain   Refills:  0        HYDROcodone-acetaminophen 5-325 MG per tablet   Commonly known as:  NORCO   Dose:  1-2 tablet   Quantity:  15 tablet        Take  1-2 tablets by mouth every 4 hours as needed for moderate to severe pain   Refills:  0        ibuprofen 600 MG tablet   Commonly known as:  ADVIL/MOTRIN   Dose:  600 mg   Quantity:  20 tablet        Take 1 tablet (600 mg) by mouth every 6 hours as needed for pain   Refills:  0        MIRENA (52 MG) 20 MCG/24HR IUD   Quantity:  1 Intra Uterine Device   Generic drug:  levonorgestrel        intrauterine uterine device placed   Refills:  0        traMADol 50 MG tablet   Commonly known as:  ULTRAM   Dose:  50 mg   Quantity:  60 tablet        Take 1 tablet (50 mg) by mouth every 6 hours as needed for moderate pain   Refills:  0        TYLENOL PO   Dose:  1000 mg        Take 1,000 mg by mouth every 6 hours as needed for mild pain or fever   Refills:  0                Prescriptions were sent or printed at these locations (2 Prescriptions)                   Other Prescriptions                Printed at Department/Unit printer (2 of 2)         naproxen (NAPROSYN) 500 MG tablet               oxyCODONE-acetaminophen (PERCOCET) 5-325 MG per tablet                Procedures and tests performed during your visit     Ace wraps    US Lower Extremity Venous Duplex Bilateral      Orders Needing Specimen Collection     None      Pending Results     Date and Time Order Name Status Description    4/6/2017 0857 US Lower Extremity Venous Duplex Bilateral Preliminary             Pending Culture Results     No orders found from 4/4/2017 to 4/7/2017.            Thank you for choosing [facility]       Thank you for choosing [facility] for your care. Our goal is always to provide you with excellent care. Hearing back from our patients is one way we can continue to improve our services. Please take a few minutes to complete the written survey that you may receive in the mail after you visit with us. Thank you!        [employer] Information     [employer] gives you secure access to your electronic health record. If you see a primary care provider, you can  also send messages to your care team and make appointments. If you have questions, please call your primary care clinic.  If you do not have a primary care provider, please call [phone] and they will assist you.        Care EveryWhere ID     This is your Care EveryWhere ID. This could be used by other organizations to access your [facility] medical records  [id number]        After Visit Summary       This is your record. Keep this with you and show to your community pharmacist(s) and doctor(s) at your next visit.

## 2017-04-06 NOTE — ED PROVIDER NOTES
History     Chief Complaint   Patient presents with     Leg Pain     pain and swelling x 1 week in right knee and calf, pain worse in back of calf, positive homans sign, no redness.     HPI  Eliana Anders is a 37-year-old woman presenting with lower extremity swelling, right worse than left. Patient states symptoms started approximately 3-4 days ago and right leg swelling has significantly worsened. She is complaining of constant, throbbing, mild to moderate, nonradiating pain in right calf. Movement makes the symptoms worse, rest improves it. She did not take any medications for symptomatic relief. She denies any trauma. No fevers. No skin color changes in the lower extremities. No prior history of DVTs. She denies any history of IV drug use.      PAST MEDICAL HISTORY  Past Medical History:   Diagnosis Date     Drug abuse     marijuana use     Nonsenile cataract      PAST SURGICAL HISTORY  Past Surgical History:   Procedure Laterality Date     ARTHROSCOPIC RECONSTRUCTION ANTERIOR CRUCIATE LIGAMENT Right 11/24/2015    Procedure: ARTHROSCOPIC RECONSTRUCTION ANTERIOR CRUCIATE LIGAMENT;  Surgeon: Tevin Cordero MD;  [facility]: [facility]     ARTHROSCOPY KNEE WITH MENISCAL REPAIR Right 11/24/2015    Procedure: ARTHROSCOPY KNEE WITH MENISCAL REPAIR;  Surgeon: Tevin Cordero MD;  Location: [facility]     C NONSPECIFIC PROCEDURE  2008    rt ankle repair     C REMOVAL GALLBLADDER  1998     HC REMOVAL OF OVARIAN CYST(S)  1998     ORTHOPEDIC SURGERY       FAMILY HISTORY  Family History   Problem Relation Age of Onset     C.A.D. Mother      MI & CHF     CANCER Maternal Grandmother      Hypertension Paternal Grandmother      CANCER Paternal Grandmother      ? cervical     CANCER Paternal Aunt      ? cervical     SOCIAL HISTORY  Social History   Substance Use Topics     Smoking status: Former Smoker     Packs/day: 0.50     Years: 11.00     Types: Cigarettes     Quit date: 9/17/2009      "Smokeless tobacco: Never Used      Comment: quit with preg     Alcohol use Yes      Comment: 2x/week     MEDICATIONS  No current facility-administered medications for this encounter.      Current Outpatient Prescriptions   Medication     Acetaminophen (TYLENOL PO)     naproxen (NAPROSYN) 500 MG tablet     oxyCODONE-acetaminophen (PERCOCET) 5-325 MG per tablet     ibuprofen (ADVIL/MOTRIN) 600 MG tablet     Naproxen Sodium (ALEVE PO)     MIRENA 20 MCG/24HR IU IUD     HYDROcodone-acetaminophen (NORCO) 5-325 MG per tablet     traMADol (ULTRAM) 50 MG tablet     ALLERGIES  Allergies   Allergen Reactions     No Known Drug Allergies        I have reviewed the Medications, Allergies, Past Medical and Surgical History, and Social History in the Epic system.    Review of Systems   Constitutional: Negative for chills and fever.   HENT: Negative for congestion.    Eyes: Negative for visual disturbance.   Respiratory: Negative for shortness of breath.    Cardiovascular: Negative for chest pain.   Gastrointestinal: Negative for abdominal pain and nausea.   Endocrine: Negative for polydipsia and polyuria.   Genitourinary: Negative for difficulty urinating.   Musculoskeletal: Negative for back pain, neck pain and neck stiffness.        Swelling in legs; right worse than left.   Skin: Negative for color change.   Allergic/Immunologic: Negative for immunocompromised state.   Neurological: Negative for weakness, light-headedness and numbness.   Hematological: Negative for adenopathy. Does not bruise/bleed easily.   Psychiatric/Behavioral: Negative for agitation and behavioral problems.       Physical Exam   BP: (!) 129/92  Pulse: 77  Temp: 97.8  F (36.6  C)  Resp: 18  Height: 170.2 cm (5' 7\")  Weight: 98 kg (216 lb)  SpO2: 98 %  Physical Exam   Constitutional: She is oriented to person, place, and time. She appears well-developed and well-nourished. No distress.   HENT:   Head: Normocephalic and atraumatic.   Mouth/Throat: Oropharynx " is clear and moist. No oropharyngeal exudate.   Eyes: Conjunctivae and EOM are normal. No scleral icterus.   Neck: Normal range of motion.   Cardiovascular: Normal rate, normal heart sounds and intact distal pulses.    Pulses:       Dorsalis pedis pulses are 2+ on the right side, and 2+ on the left side.        Posterior tibial pulses are 2+ on the right side, and 2+ on the left side.   Pulmonary/Chest: Effort normal and breath sounds normal. No respiratory distress. She has no wheezes. She has no rales.   Abdominal: Soft. Bowel sounds are normal. There is no tenderness.   Musculoskeletal: Normal range of motion. She exhibits edema ( non-pitting bilateral LE edema, right worse than left) and tenderness ( right calf).   Full range of motion, active and passive, in hips, knees, and ankles without pain. There is no knee effusion in right lower extremity. There is moderate non-pitting edema in right calf with tenderness to palpation. Compartments of right lower extremity are soft. No skin color changes in lower extremities.   Neurological: She is alert and oriented to person, place, and time. No cranial nerve deficit. She exhibits normal muscle tone. Coordination normal.   Skin: Skin is warm. No rash noted. She is not diaphoretic. No erythema.   Psychiatric: She has a normal mood and affect. Her behavior is normal. Judgment and thought content normal.   Nursing note and vitals reviewed.      ED Course     ED Course     Procedures             Critical Care time:  none               Labs Ordered and Resulted from Time of ED Arrival Up to the Time of Departure from the ED - No data to display    Assessments & Plan (with Medical Decision Making)   37-year-old woman presenting with lower extremity swelling, right greater than left. Differential diagnosis: DVT, dependent edema, lymphedema, ruptured Baker s cyst, unlikely septic arthritis, unlikely fracture.    After thorough history and physical exam patient appears to be in  no acute distress. I will obtain an ultrasound of the bilateral lower extremities for further diagnostic evaluation. Patient agrees with the plan.     I reviewed the patient s lower extremity ultrasound and I read the radiology reports; there is evidence of a complex Harrison s cyst in the right lower extremity and a hematoma in the left lower extremity. Ace wrap bandage was placed around patient s right knee. She was offered crutches, however, she declined them since she has them at home. Pain was treated with oral naproxen. I will give her a prescription for naproxen and Percocet and refer her to orthopedics for further evaluation. She agrees with this plan.         I have reviewed the nursing notes.    I have reviewed the findings, diagnosis, plan and need for follow up with the patient.    Discharge Medication List as of 4/6/2017 10:45 AM      START taking these medications    Details   naproxen (NAPROSYN) 500 MG tablet Take 1 tablet (500 mg) by mouth 2 times daily (with meals) for 8 days, Disp-16 tablet, R-0, Local Print      oxyCODONE-acetaminophen (PERCOCET) 5-325 MG per tablet Take 1-2 tablets by mouth every 4 hours as needed for pain, Disp-15 tablet, R-0, Local Print             Final diagnoses:   Baker's cyst of knee, right   Contusion of knee, left       4/6/2017   [facility], [place], EMERGENCY DEPARTMENT     Susu Keller MD  04/06/17 1376

## 2017-04-06 NOTE — TELEPHONE ENCOUNTER
Patient had ACL surgery in 2015.  She has been havibng increased knee swelling and now calf swelling and tenderness.  He calf is tender to touch.  I told her to go to the ER to get checked for a blood clot. She should also make a future appointment to see Dr. Cordero in clinic. Patient agreed with this plan.

## 2017-04-10 ENCOUNTER — OFFICE VISIT (OUTPATIENT)
Dept: ORTHOPEDICS | Facility: CLINIC | Age: 37
End: 2017-04-10

## 2017-04-10 DIAGNOSIS — G89.29 CHRONIC PAIN OF RIGHT KNEE: Primary | ICD-10-CM

## 2017-04-10 DIAGNOSIS — M25.561 CHRONIC PAIN OF RIGHT KNEE: Primary | ICD-10-CM

## 2017-04-10 RX ORDER — TRAMADOL HYDROCHLORIDE 50 MG/1
50 TABLET ORAL EVERY 6 HOURS PRN
Qty: 60 TABLET | Refills: 0 | Status: SHIPPED | OUTPATIENT
Start: 2017-04-10 | End: 2017-08-11

## 2017-04-10 NOTE — MR AVS SNAPSHOT
After Visit Summary   4/10/2017    Eliana Anders    MRN: [id number]           Patient Information     Date Of Birth          1980        Visit Information        Provider Department      4/10/2017 2:50 PM Tevin Cordero MD [facility]        Today's Diagnoses     Chronic pain of right knee    -  1       Follow-ups after your visit        Who to contact     Please call your clinic at [phone] to:    Ask questions about your health    Make or cancel appointments    Discuss your medicines    Learn about your test results    Speak to your doctor   If you have compliments or concerns about an experience at your clinic, or if you wish to file a complaint, please contact [facility] Patient Relations at [phone] or email us at [email or web address]         Additional Information About Your Visit        MyChart Information     [employer] gives you secure access to your electronic health record. If you see a primary care provider, you can also send messages to your care team and make appointments. If you have questions, please call your primary care clinic.  If you do not have a primary care provider, please call [phone] and they will assist you.      [employer] is an electronic gateway that provides easy, online access to your medical records. With [employer], you can request a clinic appointment, read your test results, renew a prescription or communicate with your care team.     To access your existing account, please contact your [facility] or call [phone] for assistance.        Care EveryWhere ID     This is your Care EveryWhere ID. This could be used by other organizations to access your [place] medical records  [id number]         Blood Pressure from Last 3 Encounters:   04/06/17 145/88   01/30/17 (!) 137/98   11/29/16 139/89    Weight from Last 3 Encounters:   04/06/17 98 kg  (216 lb)   11/29/16 95.3 kg (210 lb)   04/15/16 98.2 kg (216 lb 6.4 oz)              Today, you had the following     No orders found for display         Where to get your medicines      Some of these will need a paper prescription and others can be bought over the counter.  Ask your nurse if you have questions.     Bring a paper prescription for each of these medications     traMADol 50 MG tablet          Primary Care Provider Office Phone # Fax #    [facility] [phone]       [address] [place] [address]        Thank you!     Thank you for choosing [facility]  for your care. Our goal is always to provide you with excellent care. Hearing back from our patients is one way we can continue to improve our services. Please take a few minutes to complete the written survey that you may receive in the mail after your visit with us. Thank you!             Your Updated Medication List - Protect others around you: Learn how to safely use, store and throw away your medicines at www.disposemymeds.org.          This list is accurate as of: 4/10/17 11:59 PM.  Always use your most recent med list.                   Brand Name Dispense Instructions for use    ALEVE PO      Take by mouth daily as needed for moderate pain       HYDROcodone-acetaminophen 5-325 MG per tablet    NORCO    15 tablet    Take 1-2 tablets by mouth every 4 hours as needed for moderate to severe pain       ibuprofen 600 MG tablet    ADVIL/MOTRIN    20 tablet    Take 1 tablet (600 mg) by mouth every 6 hours as needed for pain       MIRENA (52 MG) 20 MCG/24HR IUD   Generic drug:  levonorgestrel     1 Intra Uterine Device    intrauterine uterine device placed       naproxen 500 MG tablet    NAPROSYN    16 tablet    Take 1 tablet (500 mg) by mouth 2 times daily (with meals) for 8 days       oxyCODONE-acetaminophen 5-325 MG per tablet    PERCOCET    15 tablet    Take 1-2 tablets by mouth every 4  hours as needed for pain       traMADol 50 MG tablet    ULTRAM    60 tablet    Take 1 tablet (50 mg) by mouth every 6 hours as needed for moderate pain       TYLENOL PO      Take 1,000 mg by mouth every 6 hours as needed for mild pain or fever

## 2017-04-10 NOTE — NURSING NOTE
Reason For Visit:   Chief Complaint   Patient presents with     RECHECK     Follow up, right knee possible Baker's cyst, previous injection 11/9/16     Age: 37 year old    Seen ED 4/6/17, ultrasound done to rule out DVT (negative). Symptoms are improving    Date of surgery: 11/24/15    Pain Assessment  Patient Currently in Pain: Yes  Primary Pain Location: Knee  Pain Orientation: Right  Pain Descriptors: Discomfort  Aggravating Factors: Bending

## 2017-04-10 NOTE — LETTER
4/10/2017       RE: Eliana Anders  [address]  [facility] [address]     Dear Colleague,    Thank you for referring your patient, Eliana Anders, to the [facility] at [facility]. Please see a copy of my visit note below.    REASON FOR VISIT:  Follow up right knee pain.      PROCEDURES:    11/24/2015 Right ACL reconstruction with hamstring autograft, lateral meniscus repair, medial meniscectomy and medial femoral condyle microfracture.      HISTORY OF PRESENT ILLNESS:  Eliana Anders is a 37-year-old female seen back today for evaluation of right knee pain that has worsened over the past 2 weeks.  She is status post right knee ACL reconstruction with hamstring autograft with lateral meniscus repair and medial meniscectomy with medial femoral condyle microfracture in 11/2015.  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 The patient had an injection in 11/2016 which improved her symptoms to some degree, but over the past few weeks has noted pain now involving the posterior aspect of her right knee as well as calf counseling.  She sought evaluation in the Emergency Department on 04/06/2017 in which she was diagnosed with a Baker's cyst on ultrasound.  She does not remember a specific event such as a pop for an acute worsening of her pain, but overall she states in the last few days her symptoms have been improving with rest, ice and compression as well as taking anti-inflammatories.  She is here today to discuss treatment options moving forward as well as to further discuss her diagnosis.      PHYSICAL EXAMINATION:   GENERAL:  No acute distress, well-nourished female.   RESPIRATORY:  Nonlabored respirations on room air.   CARDIOVASCULAR:  Regular rate and rhythm.    EXTREMITIES:   Right lower extremity:  Range of motion 0-120.  She is stable that anterior and posterior drawer, varus and valgus stress at full extension and at 30 degrees of flexion.  She has some patellar crepitus and pain with patellar compression.  She is mildly tender to palpation over the medial and lateral joint line.  She has posterior knee tenderness as well.  Mild fullness posteriorly.  She has intact sensation to light touch distally.  She is neurovascularly intact distally.        IMAGING:  No new imaging obtained by either x-ray or MRI, but ultrasound from 04/06/2017 reviewed and report reviewed, which demonstrates a 4 x 2 cm popliteal fossa cyst.      ASSESSMENT:  A 37-year-old female with right knee Baker cyst, improving.      PLAN:  We had a nice discussion with Eliana today regarding her symptoms and treatment modalities for this.  As she is clinically improving, we feel that she should continue with her current treatment consisting of ice, compression and elevation as well as anti-inflammatories.  We will provide a very short course of tramadol for her more acute pain but encouraged her to wean off of this as soon as she is able.  She may continue with her naproxen to help improve her inflammation.      If she has not improved in 4-6 weeks, we would consider a corticosteroid injection, as she has had these in the past.  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      The patient may follow up on an as-needed basis, but she should call if she is not improved in 4-6 weeks and we will consider right knee injection at that time.      The patient was seen by and discussed with Dr. Cordero, who helped to formulate the above assessment and plan.     Patient seen and examined. Agree with history, physical and imaging as well as Assessment and Plan as  detailed by Dr. Andrade.    Assesment: ruptured baker's cyst right knee    Plan: wbat, rom as martin, ok for motion prescription for tramadol, no brace, f/u 4-6 weeks if not better, will do injection if not improving, continued nsaids.    I was present with the resident during the history and exam.  I discussed the case with the resident and agree with the findings as documented in the assessment and plan.    Sincerely,    Tevin Cordero MD

## 2017-04-10 NOTE — PROGRESS NOTES
Patient seen and examined. Agree with history, physical and imaging as well as Assessment and Plan as detailed by Dr. Andrade.    Assesment: ruptured baker's cyst right knee    Plan: wbat, rom as martin, ok for motion prescription for tramadol, no brace, f/u 4-6 weeks if not better, will do injection if not improving, continued nsaids.    I was present with the resident during the history and exam.  I discussed the case with the resident and agree with the findings as documented in the assessment and plan.

## 2017-04-10 NOTE — PROGRESS NOTES
REASON FOR VISIT:  Follow up right knee pain.      PROCEDURES:    11/24/2015 Right ACL reconstruction with hamstring autograft, lateral meniscus repair, medial meniscectomy and medial femoral condyle microfracture.      HISTORY OF PRESENT ILLNESS:  Eliana Anders is a 37-year-old female seen back today for evaluation of right knee pain that has worsened over the past 2 weeks.  She is status post right knee ACL reconstruction with hamstring autograft with lateral meniscus repair and medial meniscectomy with medial femoral condyle microfracture in 11/2015.  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 The patient had an injection in 11/2016 which improved her symptoms to some degree, but over the past few weeks has noted pain now involving the posterior aspect of her right knee as well as calf counseling.  She sought evaluation in the Emergency Department on 04/06/2017 in which she was diagnosed with a Baker's cyst on ultrasound.  She does not remember a specific event such as a pop for an acute worsening of her pain, but overall she states in the last few days her symptoms have been improving with rest, ice and compression as well as taking anti-inflammatories.  She is here today to discuss treatment options moving forward as well as to further discuss her diagnosis.      PHYSICAL EXAMINATION:   GENERAL:  No acute distress, well-nourished female.   RESPIRATORY:  Nonlabored respirations on room air.   CARDIOVASCULAR:  Regular rate and rhythm.   EXTREMITIES:   Right lower extremity:  Range of motion 0-120.  She is stable that anterior and posterior drawer, varus and valgus stress at full extension and at 30 degrees of flexion.  She has some patellar crepitus and pain with patellar compression.  She is mildly tender to palpation over the medial and lateral joint line.   She has posterior knee tenderness as well.  Mild fullness posteriorly.  She has intact sensation to light touch distally.  She is neurovascularly intact distally.        IMAGING:  No new imaging obtained by either x-ray or MRI, but ultrasound from 04/06/2017 reviewed and report reviewed, which demonstrates a 4 x 2 cm popliteal fossa cyst.      ASSESSMENT:  A 37-year-old female with right knee Baker cyst, improving.      PLAN:  We had a nice discussion with Eliana today regarding her symptoms and treatment modalities for this.  As she is clinically improving, we feel that she should continue with her current treatment consisting of ice, compression and elevation as well as anti-inflammatories.  We will provide a very short course of tramadol for her more acute pain but encouraged her to wean off of this as soon as she is able.  She may continue with her naproxen to help improve her inflammation.      If she has not improved in 4-6 weeks, we would consider a corticosteroid injection, as she has had these in the past.  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      The patient may follow up on an as-needed basis, but she should call if she is not improved in 4-6 weeks and we will consider right knee injection at that time.      The patient was seen by and discussed with Dr. Cordero, who helped to formulate the above assessment and plan.

## 2017-07-12 ENCOUNTER — CARE COORDINATION (OUTPATIENT)
Dept: CARE COORDINATION | Facility: CLINIC | Age: 37
End: 2017-07-12

## 2017-07-12 ENCOUNTER — TELEPHONE (OUTPATIENT)
Dept: OTHER | Facility: CLINIC | Age: 37
End: 2017-07-12

## 2017-07-12 DIAGNOSIS — M25.561 RIGHT KNEE PAIN: Primary | ICD-10-CM

## 2017-07-12 NOTE — TELEPHONE ENCOUNTER
Clinical Product Navigator RN reviewed chart; patient on payer product coverage.  Review results: Met referral criteria for Care Coordinator; referral to be sent.    Pt has had 4 ED visits in the past year with no primary follow up. Per payer, pt is complex.    Adry Montes RN/Clinical Product Navigator

## 2017-08-09 ENCOUNTER — TRANSFERRED RECORDS (OUTPATIENT)
Dept: HEALTH INFORMATION MANAGEMENT | Facility: CLINIC | Age: 37
End: 2017-08-09

## 2017-08-11 ENCOUNTER — HOSPITAL ENCOUNTER (EMERGENCY)
Facility: CLINIC | Age: 37
Discharge: HOME | End: 2017-08-11
Attending: EMERGENCY MEDICINE | Admitting: EMERGENCY MEDICINE
Payer: COMMERCIAL

## 2017-08-11 ENCOUNTER — NURSE TRIAGE (OUTPATIENT)
Dept: NURSING | Facility: CLINIC | Age: 37
End: 2017-08-11

## 2017-08-11 VITALS
TEMPERATURE: 98.2 F | RESPIRATION RATE: 20 BRPM | WEIGHT: 215 LBS | OXYGEN SATURATION: 98 % | SYSTOLIC BLOOD PRESSURE: 122 MMHG | HEART RATE: 64 BPM | DIASTOLIC BLOOD PRESSURE: 76 MMHG | BODY MASS INDEX: 33.67 KG/M2
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
## 2017-08-11 DIAGNOSIS — M25.561 CHRONIC PAIN OF RIGHT KNEE: ICD-10-CM

## 2017-08-11 DIAGNOSIS — R07.89 CHEST WALL PAIN: ICD-10-CM

## 2017-08-11 DIAGNOSIS — S20.219A CONTUSION OF CHEST WALL, UNSPECIFIED LATERALITY, INITIAL ENCOUNTER: ICD-10-CM

## 2017-08-11 DIAGNOSIS — G89.29 CHRONIC PAIN OF RIGHT KNEE: ICD-10-CM

## 2017-08-11 DIAGNOSIS — S20.219A CHEST WALL CONTUSION, UNSPECIFIED LATERALITY, INITIAL ENCOUNTER: ICD-10-CM

## 2017-08-11 PROCEDURE — 99284 EMERGENCY DEPT VISIT MOD MDM: CPT | Mod: 25 | Performed by: EMERGENCY MEDICINE

## 2017-08-11 PROCEDURE — 93010 ELECTROCARDIOGRAM REPORT: CPT | Mod: Z6 | Performed by: EMERGENCY MEDICINE

## 2017-08-11 PROCEDURE — 96372 THER/PROPH/DIAG INJ SC/IM: CPT | Performed by: EMERGENCY MEDICINE

## 2017-08-11 PROCEDURE — 93005 ELECTROCARDIOGRAM TRACING: CPT | Performed by: EMERGENCY MEDICINE

## 2017-08-11 PROCEDURE — 25000128 H RX IP 250 OP 636: Performed by: EMERGENCY MEDICINE

## 2017-08-11 RX ORDER — TRAMADOL HYDROCHLORIDE 50 MG/1
50 TABLET ORAL EVERY 6 HOURS PRN
Qty: 15 TABLET | Refills: 0 | Status: SHIPPED | OUTPATIENT
Start: 2017-08-11 | End: 2017-12-19

## 2017-08-11 RX ORDER — KETOROLAC TROMETHAMINE 30 MG/ML
60 INJECTION, SOLUTION INTRAMUSCULAR; INTRAVENOUS ONCE
Status: COMPLETED | OUTPATIENT
Start: 2017-08-11 | End: 2017-08-11

## 2017-08-11 RX ADMIN — KETOROLAC TROMETHAMINE 60 MG: 30 INJECTION, SOLUTION INTRAMUSCULAR at 13:55

## 2017-08-11 ASSESSMENT — ENCOUNTER SYMPTOMS
FEVER: 0
CONFUSION: 0
COLOR CHANGE: 0
EYE REDNESS: 0
NECK STIFFNESS: 0
SHORTNESS OF BREATH: 0
DIFFICULTY URINATING: 0
HEADACHES: 0
ARTHRALGIAS: 0
ABDOMINAL PAIN: 0

## 2017-08-11 NOTE — DISCHARGE INSTRUCTIONS
Chest Contusion    A contusion is a bruise to the skin, muscle, or ribs. It may cause pain, tenderness, and swelling. It may turn the skin purple until it heals. Contusions take a few days to a few weeks to heal.  Home care  Follow these guidelines when caring for yourself at home:    Rest. Don t do any heavy lifting or strenuous activity. Don t do any activity that causes pain.    Put an ice pack on the injured area. Do this for 20 minutes every 1 to 2 hours the first day. You can make an ice pack by wrapping a plastic bag of ice cubes in a thin towel. Continue to use the ice pack 3 to 4 times a day for the next 2 days. Then use the ice pack as needed to ease pain and swelling.    After 1 to 2 days you may put a warm compress on the area. Do this for 10 minutes several times a day. A warm compress is a clean cloth that s damp with warm water.    Hold a pillow to the affected area when you cough. This will help ease pain.    You may use acetaminophen or ibuprofen to control pain, unless another pain medicine was prescribed. If you have chronic liver or kidney disease, talk with your health care provider before using these medicines. Also talk with your provider if you ve had a stomach ulcer or GI bleeding.  Follow-up care  Follow up with your health care provider during the next week, or as advised.  When to seek medical advice  Call your health care provider right away if any of these occur:    Shortness of breath, difficulty breathing, or breathing fast    Chest pain gets worse when you breathe    Severe pain that comes on suddenly or lasts more than an hour    Dizziness, weakness, or fainting    New abdominal pain or abdominal pain that gets worse     Fever of 101 F (38.3 C) or higher, or as directed by your health care provider  Date Last Reviewed: 2/15/2015    [id number] [employer]. [address] [place] [address], [place] [address]. All rights reserved. This information is not intended as a substitute  for professional medical care. Always follow your healthcare professional's instructions.
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
## 2017-08-11 NOTE — ED AVS SNAPSHOT
[facility], [place], Emergency Department    [address]    [facility] [address]    Phone:  [phone]    Fax:  [phone]                                       Eliana Anders   MRN: [id number]    Department:  [facility], Emergency Department   Date of Visit:  8/11/2017           After Visit Summary Signature Page     I have received my discharge instructions, and my questions have been answered. I have discussed any challenges I see with this plan with the nurse or doctor.    ..........................................................................................................................................  Patient/Patient Representative Signature      ..........................................................................................................................................  Patient Representative Print Name and Relationship to Patient    ..................................................               ................................................  Date                                            Time    ..........................................................................................................................................  Reviewed by Signature/Title    ...................................................              ..............................................  Date                                                            Time

## 2017-08-11 NOTE — ED AVS SNAPSHOT
[facility], Emergency Department    [address]    [place] [address]    Phone:  [phone]    Fax:  [phone]                                       Eliana Anders   MRN: [id number]    Department:  [facility], Emergency Department   Date of Visit:  8/11/2017           Patient Information     Date Of Birth          1980        Your diagnoses for this visit were:     Chest wall pain     Chest wall contusion, unspecified laterality, initial encounter     Chronic pain of right knee        You were seen by Darryn Dominguez MD.      Follow-up Information     Follow up with Clinic, [facility].    Specialty:  Clinic    Contact information:    [address] [facility] [address]  [facility] [address] [phone]          Discharge Instructions         Chest Contusion    A contusion is a bruise to the skin, muscle, or ribs. It may cause pain, tenderness, and swelling. It may turn the skin purple until it heals. Contusions take a few days to a few weeks to heal.  Home care  Follow these guidelines when caring for yourself at home:    Rest. Don t do any heavy lifting or strenuous activity. Don t do any activity that causes pain.    Put an ice pack on the injured area. Do this for 20 minutes every 1 to 2 hours the first day. You can make an ice pack by wrapping a plastic bag of ice cubes in a thin towel. Continue to use the ice pack 3 to 4 times a day for the next 2 days. Then use the ice pack as needed to ease pain and swelling.    After 1 to 2 days you may put a warm compress on the area. Do this for 10 minutes several times a day. A warm compress is a clean cloth that s damp with warm water.    Hold a pillow to the affected area when you cough. This will help ease pain.    You may use acetaminophen or ibuprofen to control pain, unless another pain medicine was prescribed. If you have chronic liver or kidney disease, talk with your health care provider before using these medicines.  Also talk with your provider if you ve had a stomach ulcer or GI bleeding.  Follow-up care  Follow up with your health care provider during the next week, or as advised.  When to seek medical advice  Call your health care provider right away if any of these occur:    Shortness of breath, difficulty breathing, or breathing fast    Chest pain gets worse when you breathe    Severe pain that comes on suddenly or lasts more than an hour    Dizziness, weakness, or fainting    New abdominal pain or abdominal pain that gets worse     Fever of 101 F (38.3 C) or higher, or as directed by your health care provider  Date Last Reviewed: 2/15/2015    [id number] [employer]. [address] [place] [address], [place] [address]. All rights reserved. This information is not intended as a substitute for professional medical care. Always follow your healthcare professional's instructions.          24 Hour Appointment Hotline       To make an appointment at any [facility] ([facility]), call [phone] ([phone]). If you don't have a family doctor or clinic, we will help you find one. [facility] clinics are conveniently located to serve the needs of you and your family.             Review of your medicines      Our records show that you are taking the medicines listed below. If these are incorrect, please call your family doctor or clinic.        Dose / Directions Last dose taken    ALEVE PO        Take by mouth daily as needed for moderate pain   Refills:  0        ibuprofen 600 MG tablet   Commonly known as:  ADVIL/MOTRIN   Dose:  600 mg   Quantity:  20 tablet        Take 1 tablet (600 mg) by mouth every 6 hours as needed for pain   Refills:  0        MIRENA (52 MG) 20 MCG/24HR IUD   Quantity:  1 Intra Uterine Device   Generic drug:  levonorgestrel        intrauterine uterine device placed   Refills:  0        oxyCODONE-acetaminophen 5-325 MG per tablet   Commonly known as:  PERCOCET   Dose:  1-2 tablet   Quantity:  15 tablet         Take 1-2 tablets by mouth every 4 hours as needed for pain   Refills:  0        traMADol 50 MG tablet   Commonly known as:  ULTRAM   Dose:  50 mg   Quantity:  15 tablet        Take 1 tablet (50 mg) by mouth every 6 hours as needed for moderate pain   Refills:  0        TYLENOL PO   Dose:  1000 mg        Take 1,000 mg by mouth every 6 hours as needed for mild pain or fever   Refills:  0                Prescriptions were sent or printed at these locations (1 Prescription)                   Other Prescriptions                Printed at Department/Unit printer (1 of 1)         traMADol (ULTRAM) 50 MG tablet                Procedures and tests performed during your visit     EKG 12 lead    Ice to affected area      Orders Needing Specimen Collection     None      Pending Results     No orders found from 8/9/2017 to 8/12/2017.            Pending Culture Results     No orders found from 8/9/2017 to 8/12/2017.            Pending Results Instructions     If you had any lab results that were not finalized at the time of your Discharge, you can call the ED Lab Result RN at [phone]. You will be contacted by this team for any positive Lab results or changes in treatment. The nurses are available 7 days a week from 10A to 6:30P.  You can leave a message 24 hours per day and they will return your call.        Thank you for choosing [facility]       Thank you for choosing [facility] for your care. Our goal is always to provide you with excellent care. Hearing back from our patients is one way we can continue to improve our services. Please take a few minutes to complete the written survey that you may receive in the mail after you visit with us. Thank you!        [employer] Information     [employer] gives you secure access to your electronic health record. If you see a primary care provider, you can also send messages to your care team and make appointments. If you have questions, please call your primary care clinic.  If you do not  have a primary care provider, please call [phone] and they will assist you.        Care EveryWhere ID     This is your Care EveryWhere ID. This could be used by other organizations to access your [place] medical records  [id number]        Equal Access to Services     KEL BURNETT : Edwin Macario, georgette dahl, andre rinaldi. So [facility] [phone].    ATENCIÓN: Si habla español, tiene a murillo disposición servicios gratuitos de asistencia lingüística. Llame al [phone].    We comply with applicable federal civil rights laws and [place] laws. We do not discriminate on the basis of race, color, national origin, age, disability sex, sexual orientation or gender identity.            After Visit Summary       This is your record. Keep this with you and show to your community pharmacist(s) and doctor(s) at your next visit.
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
## 2017-08-13 LAB — INTERPRETATION ECG - MUSE: NORMAL

## 2017-08-14 ENCOUNTER — CARE COORDINATION (OUTPATIENT)
Dept: FAMILY MEDICINE | Facility: CLINIC | Age: 37
End: 2017-08-14

## 2017-08-14 ENCOUNTER — OFFICE VISIT (OUTPATIENT)
Dept: FAMILY MEDICINE | Facility: CLINIC | Age: 37
End: 2017-08-14
Payer: COMMERCIAL

## 2017-08-14 VITALS
DIASTOLIC BLOOD PRESSURE: 87 MMHG | WEIGHT: 210.1 LBS | BODY MASS INDEX: 32.91 KG/M2 | HEART RATE: 82 BPM | OXYGEN SATURATION: 100 % | TEMPERATURE: 98.1 F | SYSTOLIC BLOOD PRESSURE: 125 MMHG

## 2017-08-14 DIAGNOSIS — S20.219A CHEST WALL CONTUSION, UNSPECIFIED LATERALITY, INITIAL ENCOUNTER: Primary | ICD-10-CM
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
## 2017-08-14 DIAGNOSIS — R07.89 CHEST WALL PAIN: ICD-10-CM

## 2017-08-14 PROCEDURE — 99213 OFFICE O/P EST LOW 20 MIN: CPT | Performed by: PHYSICIAN ASSISTANT

## 2017-08-14 RX ORDER — TRAMADOL HYDROCHLORIDE 50 MG/1
50 TABLET ORAL EVERY 6 HOURS PRN
Qty: 20 TABLET | Refills: 0 | Status: SHIPPED | OUTPATIENT
Start: 2017-08-14 | End: 2017-12-19
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
## 2017-08-14 NOTE — NURSING NOTE
"Chief Complaint   Patient presents with     RECHECK       Initial /87 (BP Location: Left arm, Patient Position: Chair, Cuff Size: Adult Regular)  Pulse 82  Temp 98.1  F (36.7  C) (Oral)  Wt 210 lb 1.6 oz (95.3 kg)  SpO2 100%  BMI 32.91 kg/m2 Estimated body mass index is 32.91 kg/(m^2) as calculated from the following:    Height as of 4/6/17: 5' 7\" (1.702 m).    Weight as of this encounter: 210 lb 1.6 oz (95.3 kg).  Medication Reconciliation: complete     Donna Merino CMA    "
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
## 2017-08-14 NOTE — LETTER
Health Care Home - Access Care Plan    About Me  Patient Name:  Eliana Finney    YOB: 1980  Age:                            37 year old   [place] MRN:         [id number] Telephone Information:     Home Phone [phone]   Mobile [phone]       Address:    [address]  [facility] [address] Email address:  [email or web address]      Emergency Contact(s)  Name Relationship Lgl Grd Work Phone Home Phone Mobile Phone   1. ASHLEY FINNEY Sister   [phone]    2. CHRIS FLORES* Significant ot*   [phone]             Health Maintenance: Routine Health maintenance Reviewed: Due/Overdue     My Access Plan  Medical Emergency 911   Questions or concerns during clinic hours Primary Clinic Line, I will call the clinic directly: Primary Clinic: [facility]- [phone]   24 Hour Appointment Line [phone] or  [phone])  (toll free)   24 Hour Nurse Line [phone] (toll free)   Questions or concerns outside clinic hours 24 Hour Appointment Line, I will call the after-hours on-call line:   [facility] [phone] or [phone]) (toll-free)   Preferred Urgent Care Preferred Urgent Care: [facility], [phone]   Preferred Hospital Preferred Hospital: [facility]  [phone]   [facility] [address] [facility] [address]     Behavioral Health Crisis Line The National Suicide Prevention Lifeline at 1-259.835.9389 or 911     My Care Team Members  Patient Care Team       Relationship Specialty Notifications Start End    Clinic, [facility] PCP - General Clinic  8/31/16     Comment:  per pt no primary doctor.    Phone: [phone] Fax: [phone]         [address] [facility] [address] [facility] [address]    Anuradha Ku PA-C  Physician Assistant Physician Assistant  10/16/15     Comment:  Referring to Ortho    Phone: [phone] Fax: [phone]         [address] [place] [address] [facility] [address]    Tevin Cordero MD MD Orthopedics  4/14/16     Phone: [phone] Fax: [phone]         [address] [facility] [address]        My Medical and Care Information  Problem List   Patient Active Problem List   Diagnosis     Surveillance of previously prescribed intrauterine contraceptive device     CARDIOVASCULAR SCREENING; LDL GOAL LESS THAN 160     ACL tear     Acute meniscal tear of right knee, initial encounter     Acquired defect of articular cartilage     Orthopedic aftercare NEC     Abnormal gait     Right knee pain      Current Medications and Allergies:  See printed Medication Report

## 2017-08-14 NOTE — PATIENT INSTRUCTIONS
Chest Contusion    A contusion is a bruise to the skin, muscle, or ribs. It may cause pain, tenderness, and swelling. It may turn the skin purple until it heals. Contusions take a few days to a few weeks to heal.  Home care  Follow these guidelines when caring for yourself at home:    Rest. Don t do any heavy lifting or strenuous activity. Don t do any activity that causes pain.    Put an ice pack on the injured area. Do this for 20 minutes every 1 to 2 hours the first day. You can make an ice pack by wrapping a plastic bag of ice cubes in a thin towel. Continue to use the ice pack 3 to 4 times a day for the next 2 days. Then use the ice pack as needed to ease pain and swelling.    After 1 to 2 days you may put a warm compress on the area. Do this for 10 minutes several times a day. A warm compress is a clean cloth that s damp with warm water.    Hold a pillow to the affected area when you cough. This will help ease pain.    You may use acetaminophen or ibuprofen to control pain, unless another pain medicine was prescribed. If you have chronic liver or kidney disease, talk with your health care provider before using these medicines. Also talk with your provider if you ve had a stomach ulcer or GI bleeding.  Follow-up care  Follow up with your health care provider during the next week, or as advised.  When to seek medical advice  Call your health care provider right away if any of these occur:    Shortness of breath, difficulty breathing, or breathing fast    Chest pain gets worse when you breathe    Severe pain that comes on suddenly or lasts more than an hour    Dizziness, weakness, or fainting    New abdominal pain or abdominal pain that gets worse     Fever of 101 F (38.3 C) or higher, or as directed by your health care provider  Date Last Reviewed: 2/15/2015    4528-2545 [employer]. [address] [place] [address], [place] [address]. All rights reserved. This information is not intended as a substitute  for professional medical care. Always follow your healthcare professional's instructions.

## 2017-08-14 NOTE — LETTER
[facility]  [address] [place] [address] [address]  [facility] [address]  Phone: [phone]  Fax: [phone]      August 14, 2017      Eliana Anders  [address] [place] [id number]    Dear Eliana,  I am the Clinic Care Coordinator that works with your primary care provider's clinic. I wanted to introduce myself and provide you with my contact information for you to be able to call me with any questions or concerns. Below is a description of what Clinic Care Coordination is and how I can further assist you.     The Clinic Care Coordinator role is a Registered Nurse and/or  who understands the health care system. The goal of Clinic Care Coordination is to help you manage your health and improve access to the [facility] system in the most efficient manner.  The Registered Nurse can assist you in meeting your health care goals by providing education, coordinating services, and strengthening the communication among your providers. The  can assist you with financial, behavioral, psychosocial, and chemical dependency and counseling/psychiatric resources.    Please feel free to keep this letter and contact information to contact me at [phone] with any further questions or concerns that may arise. We at [facility] are focused on providing you with the highest-quality healthcare experience possible and that all starts with you.       Sincerely,     Kayla Hastings    Enclosed: I have enclosed a copy of a 24 Hour Access Plan. This has helpful phone numbers for you to call when needed. Please keep this in an easy to access place to use as needed.

## 2017-08-15 ASSESSMENT — ENCOUNTER SYMPTOMS
FOCAL WEAKNESS: 0
NAUSEA: 0
FEVER: 0
ABDOMINAL PAIN: 0
VOMITING: 0
HEADACHES: 0
SHORTNESS OF BREATH: 0
CHILLS: 0
DIARRHEA: 0
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
## 2017-08-24 ENCOUNTER — TELEPHONE (OUTPATIENT)
Dept: FAMILY MEDICINE | Facility: CLINIC | Age: 37
End: 2017-08-24

## 2017-08-24 ENCOUNTER — OFFICE VISIT (OUTPATIENT)
Dept: FAMILY MEDICINE | Facility: CLINIC | Age: 37
End: 2017-08-24
Payer: COMMERCIAL

## 2017-08-24 VITALS
WEIGHT: 209 LBS | DIASTOLIC BLOOD PRESSURE: 89 MMHG | TEMPERATURE: 97.5 F | SYSTOLIC BLOOD PRESSURE: 132 MMHG | BODY MASS INDEX: 32.73 KG/M2 | HEART RATE: 75 BPM | OXYGEN SATURATION: 97 %

## 2017-08-24 DIAGNOSIS — S20.219A CHEST WALL CONTUSION, UNSPECIFIED LATERALITY, INITIAL ENCOUNTER: Primary | ICD-10-CM

## 2017-08-24 PROCEDURE — 99213 OFFICE O/P EST LOW 20 MIN: CPT | Performed by: PHYSICIAN ASSISTANT

## 2017-08-24 RX ORDER — LIDOCAINE 50 MG/G
PATCH TOPICAL
Qty: 28 PATCH | Refills: 2 | Status: SHIPPED | OUTPATIENT
Start: 2017-08-24 | End: 2017-08-29

## 2017-08-24 RX ORDER — TRAMADOL HYDROCHLORIDE 50 MG/1
50 TABLET ORAL EVERY 6 HOURS PRN
Qty: 20 TABLET | Refills: 0 | Status: SHIPPED | OUTPATIENT
Start: 2017-08-24 | End: 2017-12-19

## 2017-08-24 ASSESSMENT — ENCOUNTER SYMPTOMS
NAUSEA: 0
MYALGIAS: 1
HEADACHES: 0
ABDOMINAL PAIN: 0
FEVER: 0
CHILLS: 0
FOCAL WEAKNESS: 0
SHORTNESS OF BREATH: 0
VOMITING: 0
DIARRHEA: 0

## 2017-08-24 NOTE — PROGRESS NOTES
"  SUBJECTIVE:   Eliana Centeno Bishnu Anders is a 37 year old female who presents to clinic today for the following health issues:    Taking ibuprofen every 4-5 hours. Has Tramadol tomorrow        {additional problems for provider to add:720872}    Problem list and histories reviewed & adjusted, as indicated.  Additional history: {NONE - AS DOCUMENTED:937840::\"as documented\"}    {HIST REVIEW/ LINKS 2:323054}    Reviewed and updated as needed this visit by clinical staff       Reviewed and updated as needed this visit by Provider       {PROVIDER CHARTING PREFERENCE:811447}      "
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
## 2017-08-24 NOTE — TELEPHONE ENCOUNTER
The prior authorization has been faxed to the plan through [employer] and the key is s A. The plan will receive the PA as a paper copy.    The plan will respond with the PA determination directly to the office, usually via fax  Ama Cardoso MA
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
## 2017-08-24 NOTE — MR AVS SNAPSHOT
After Visit Summary   8/24/2017    Eliana Anders    MRN: [id number]           Patient Information     Date Of Birth          1980        Visit Information        Provider Department      8/24/2017 2:30 PM Yudi Powers PA-C [facility]        Today's Diagnoses     Chest wall contusion, unspecified laterality, initial encounter    -  1       Follow-ups after your visit        Who to contact     If you have questions or need follow up information about today's clinic visit or your schedule please contact [facility] directly at [phone].  Normal or non-critical lab and imaging results will be communicated to you by [employer], letter or phone within 4 business days after the clinic has received the results. If you do not hear from us within 7 days, please contact the clinic through [employer] or phone. If you have a critical or abnormal lab result, we will notify you by phone as soon as possible.  Submit refill requests through [employer] or call your pharmacy and they will forward the refill request to us. Please allow 3 business days for your refill to be completed.          Additional Information About Your Visit        MyChart Information     [employer] gives you secure access to your electronic health record. If you see a primary care provider, you can also send messages to your care team and make appointments. If you have questions, please call your primary care clinic.  If you do not have a primary care provider, please call [phone] and they will assist you.        Care EveryWhere ID     This is your Care EveryWhere ID. This could be used by other organizations to access your [place] medical records  [id number]        Your Vitals Were     Pulse Temperature Pulse Oximetry BMI (Body Mass Index)          75 97.5  F (36.4  C) (Oral) 97% 32.73 kg/m2         Blood Pressure from Last 3 Encounters:   08/24/17 132/89   08/14/17 125/87    08/11/17 122/76    Weight from Last 3 Encounters:   08/24/17 209 lb (94.8 kg)   08/14/17 210 lb 1.6 oz (95.3 kg)   08/11/17 215 lb (97.5 kg)              Today, you had the following     No orders found for display         Today's Medication Changes          These changes are accurate as of: 8/24/17  3:10 PM.  If you have any questions, ask your nurse or doctor.               Start taking these medicines.        Dose/Directions    lidocaine 5 % Patch   Commonly known as:  LIDODERM   Used for:  Chest wall contusion, unspecified laterality, initial encounter   Started by:  Yudi Powers PA-PATRICIO        Apply up to 3 patches to affected area at once for up to 12 h within a 24 h period.   Quantity:  28 patch   Refills:  2         These medicines have changed or have updated prescriptions.        Dose/Directions    * traMADol 50 MG tablet   Commonly known as:  ULTRAM   This may have changed:  Another medication with the same name was added. Make sure you understand how and when to take each.   Used for:  Chronic pain of right knee        Dose:  50 mg   Take 1 tablet (50 mg) by mouth every 6 hours as needed for moderate pain   Quantity:  15 tablet   Refills:  0       * traMADol 50 MG tablet   Commonly known as:  ULTRAM   This may have changed:  Another medication with the same name was added. Make sure you understand how and when to take each.   Used for:  Chest wall pain   Changed by:  Yudi Powers PA-C        Dose:  50 mg   Take 1 tablet (50 mg) by mouth every 6 hours as needed for moderate pain   Quantity:  20 tablet   Refills:  0       * traMADol 50 MG tablet   Commonly known as:  ULTRAM   This may have changed:  You were already taking a medication with the same name, and this prescription was added. Make sure you understand how and when to take each.   Used for:  Chest wall contusion, unspecified laterality, initial encounter   Changed by:  Yudi Powers PA-C        Dose:  50 mg    Take 1 tablet (50 mg) by mouth every 6 hours as needed for moderate pain   Quantity:  20 tablet   Refills:  0       * Notice:  This list has 3 medication(s) that are the same as other medications prescribed for you. Read the directions carefully, and ask your doctor or other care provider to review them with you.         Where to get your medicines      These medications were sent to [facility] [id number] [address] [address]     Phone:  [phone]     lidocaine 5 % Patch         Some of these will need a paper prescription and others can be bought over the counter.  Ask your nurse if you have questions.     Bring a paper prescription for each of these medications     traMADol 50 MG tablet                Primary Care Provider Office Phone # Fax #    [facility] [phone]       [address] [facility] [address]  [facility] [address]        Equal Access to Services     KEL BURNETT AH: Hadii radha guaman hadasho Soomaali, waaxda luqadaha, qaybta kaalmada adeegyada, waxay vaniin hayfannyn mj senior. So [facility] [phone].    ATENCIÓN: Si habla español, tiene a murillo disposición servicios gratuitos de asistencia lingüística. Jason al [phone].    We comply with applicable federal civil rights laws and [place] laws. We do not discriminate on the basis of race, color, national origin, age, disability sex, sexual orientation or gender identity.            Thank you!     Thank you for choosing [facility]  for your care. Our goal is always to provide you with excellent care. Hearing back from our patients is one way we can continue to improve our services. Please take a few minutes to complete the written survey that you may receive in the mail after your visit with us. Thank you!             Your Updated Medication List - Protect others around you: Learn how to safely use, store and throw away your  medicines at www.disposemymeds.org.          This list is accurate as of: 8/24/17  3:10 PM.  Always use your most recent med list.                   Brand Name Dispense Instructions for use Diagnosis    ALEVE PO      Take by mouth daily as needed for moderate pain        ibuprofen 600 MG tablet    ADVIL/MOTRIN    20 tablet    Take 1 tablet (600 mg) by mouth every 6 hours as needed for pain        lidocaine 5 % Patch    LIDODERM    28 patch    Apply up to 3 patches to affected area at once for up to 12 h within a 24 h period.    Chest wall contusion, unspecified laterality, initial encounter       MIRENA (52 MG) 20 MCG/24HR IUD   Generic drug:  levonorgestrel     1 Intra Uterine Device    intrauterine uterine device placed    Surveillance of previously prescribed intrauterine contraceptive device, Insertion of IUD       oxyCODONE-acetaminophen 5-325 MG per tablet    PERCOCET    15 tablet    Take 1-2 tablets by mouth every 4 hours as needed for pain        * traMADol 50 MG tablet    ULTRAM    15 tablet    Take 1 tablet (50 mg) by mouth every 6 hours as needed for moderate pain    Chronic pain of right knee       * traMADol 50 MG tablet    ULTRAM    20 tablet    Take 1 tablet (50 mg) by mouth every 6 hours as needed for moderate pain    Chest wall pain       * traMADol 50 MG tablet    ULTRAM    20 tablet    Take 1 tablet (50 mg) by mouth every 6 hours as needed for moderate pain    Chest wall contusion, unspecified laterality, initial encounter       TYLENOL PO      Take 1,000 mg by mouth every 6 hours as needed for mild pain or fever        * Notice:  This list has 3 medication(s) that are the same as other medications prescribed for you. Read the directions carefully, and ask your doctor or other care provider to review them with you.

## 2017-08-25 ENCOUNTER — TELEPHONE (OUTPATIENT)
Dept: FAMILY MEDICINE | Facility: CLINIC | Age: 37
End: 2017-08-25
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
## 2017-08-25 NOTE — TELEPHONE ENCOUNTER
Yudi    There is a letter started for you for the pt,     please review, revise and advise    Mary Parekh RN   [facility]

## 2017-08-25 NOTE — TELEPHONE ENCOUNTER
"Placed letter in the \"Faxes/Mailing\" basket.  If patient would like a different opinion on her return to work date she can see another provider at the clinic.    Yudi Powers PA-C  "
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
## 2017-08-25 NOTE — TELEPHONE ENCOUNTER
Fax received from plan. New prior authorization needed. Called plan at [phone] to initiate prior authorization. PA is being clinically review, marked urgent, will receive determination via fax once a decision has been made.    Sophia Monge, CMA

## 2017-08-25 NOTE — TELEPHONE ENCOUNTER
Letter faxed to pt employer at fax listed below  Informed pt it was done    Mary Parekh RN   [facility]

## 2017-08-29 ENCOUNTER — OFFICE VISIT (OUTPATIENT)
Dept: FAMILY MEDICINE | Facility: CLINIC | Age: 37
End: 2017-08-29
Payer: COMMERCIAL

## 2017-08-29 VITALS
WEIGHT: 208.9 LBS | BODY MASS INDEX: 32.72 KG/M2 | TEMPERATURE: 97.8 F | OXYGEN SATURATION: 97 % | HEART RATE: 72 BPM | SYSTOLIC BLOOD PRESSURE: 138 MMHG | DIASTOLIC BLOOD PRESSURE: 92 MMHG

## 2017-08-29 DIAGNOSIS — R07.89 CHEST WALL PAIN: Primary | ICD-10-CM

## 2017-08-29 PROCEDURE — 99213 OFFICE O/P EST LOW 20 MIN: CPT | Performed by: PHYSICIAN ASSISTANT

## 2017-08-29 RX ORDER — TRAMADOL HYDROCHLORIDE 50 MG/1
50 TABLET ORAL EVERY 6 HOURS PRN
Qty: 18 TABLET | Refills: 0 | Status: SHIPPED | OUTPATIENT
Start: 2017-08-29 | End: 2017-12-19

## 2017-08-29 NOTE — TELEPHONE ENCOUNTER
She can  some Salonpas lidocaine patches over the counter in that case.    Thanks,  Yudi Powers PA-C

## 2017-08-29 NOTE — TELEPHONE ENCOUNTER
Prior Authorization: Denied        Denied Reason: current Lidoderm criteria does not allow coverage if lidocaine patch 5% if this medication is not being prescribed for ant of the following: A.) Pain associated with post-herpetic Neuralgia, B)pain associated with diabetic neuropathy,  C) pain associated with carcer related neuropathy      Alternatives:    Deanna Mcnally August 29, 2017 at 10:24 AM

## 2017-08-29 NOTE — MR AVS SNAPSHOT
"              After Visit Summary   8/29/2017    Eliana Anders    MRN: [id number]           Patient Information     Date Of Birth          1980        Visit Information        Provider Department      8/29/2017 11:20 AM Kinjal Chiu PA-C [facility]        Today's Diagnoses     Chest wall pain    -  1       Follow-ups after your visit        Additional Services     PHYSICAL THERAPY REFERRAL       *This therapy referral will be filtered to a centralized scheduling office at [facility] and the patient will receive a call to schedule an appointment at a [facility] location most convenient for them. *     [facility] provides Physical Therapy evaluation and treatment and many specialty services across the [facility] system.  If requesting a specialty program, please choose from the list below.    If you have not heard from the scheduling office within 2 business days, please call [phone] for all locations, with the exception of [place], please call [phone].  Treatment: Evaluation & Treatment  Special Instructions/Modalities:   Special Programs: None    Please be aware that coverage of these services is subject to the terms and limitations of your health insurance plan.  Call member services at your health plan with any benefit or coverage questions.      **Note to Provider:  If you are referring outside of [facility] for the therapy appointment, please list the name of the location in the \"special instructions\" above, print the referral and give to the patient to schedule the appointment.                  Your next 10 appointments already scheduled     Sep 13, 2017  4:00 PM CDT   PHYSICAL with CARISA Austin CNP   [facility] ([facility])    [address] [place] [address] [phone]              Who to contact     If you have questions or need follow up " information about today's clinic visit or your schedule please contact [facility] directly at [phone].  Normal or non-critical lab and imaging results will be communicated to you by [employer], letter or phone within 4 business days after the clinic has received the results. If you do not hear from us within 7 days, please contact the clinic through [employer] or phone. If you have a critical or abnormal lab result, we will notify you by phone as soon as possible.  Submit refill requests through [employer] or call your pharmacy and they will forward the refill request to us. Please allow 3 business days for your refill to be completed.          Additional Information About Your Visit        [employer] Information     [employer] gives you secure access to your electronic health record. If you see a primary care provider, you can also send messages to your care team and make appointments. If you have questions, please call your primary care clinic.  If you do not have a primary care provider, please call [phone] and they will assist you.        Care EveryWhere ID     This is your Care EveryWhere ID. This could be used by other organizations to access your [place] medical records  [id number]        Your Vitals Were     Pulse Temperature Pulse Oximetry BMI (Body Mass Index)          72 97.8  F (36.6  C) (Oral) 97% 32.72 kg/m2         Blood Pressure from Last 3 Encounters:   08/29/17 (!) 138/92   08/24/17 132/89   08/14/17 125/87    Weight from Last 3 Encounters:   08/29/17 208 lb 14.4 oz (94.8 kg)   08/24/17 209 lb (94.8 kg)   08/14/17 210 lb 1.6 oz (95.3 kg)              We Performed the Following     PHYSICAL THERAPY REFERRAL          Today's Medication Changes          These changes are accurate as of: 8/29/17 11:39 AM.  If you have any questions, ask your nurse or doctor.               These medicines have changed or have updated prescriptions.        Dose/Directions    * traMADol 50 MG tablet    Commonly known as:  ULTRAM   This may have changed:  Another medication with the same name was added. Make sure you understand how and when to take each.   Used for:  Chronic pain of right knee        Dose:  50 mg   Take 1 tablet (50 mg) by mouth every 6 hours as needed for moderate pain   Quantity:  15 tablet   Refills:  0       * traMADol 50 MG tablet   Commonly known as:  ULTRAM   This may have changed:  Another medication with the same name was added. Make sure you understand how and when to take each.   Used for:  Chest wall pain   Changed by:  Yudi Powers PA-C        Dose:  50 mg   Take 1 tablet (50 mg) by mouth every 6 hours as needed for moderate pain   Quantity:  20 tablet   Refills:  0       * traMADol 50 MG tablet   Commonly known as:  ULTRAM   This may have changed:  Another medication with the same name was added. Make sure you understand how and when to take each.   Used for:  Chest wall contusion, unspecified laterality, initial encounter   Changed by:  Yudi Powers PA-C        Dose:  50 mg   Take 1 tablet (50 mg) by mouth every 6 hours as needed for moderate pain   Quantity:  20 tablet   Refills:  0       * traMADol 50 MG tablet   Commonly known as:  ULTRAM   This may have changed:  You were already taking a medication with the same name, and this prescription was added. Make sure you understand how and when to take each.   Used for:  Chest wall pain   Changed by:  Kinjal Chiu PA-C        Dose:  50 mg   Take 1 tablet (50 mg) by mouth every 6 hours as needed for pain maximum 6 tablet(s) per day   Quantity:  18 tablet   Refills:  0       * Notice:  This list has 4 medication(s) that are the same as other medications prescribed for you. Read the directions carefully, and ask your doctor or other care provider to review them with you.      Stop taking these medicines if you haven't already. Please contact your care team if you have questions.     lidocaine 5 % Patch    Commonly known as:  LIDODERM   Stopped by:  Kinjal Chiu PA-C           oxyCODONE-acetaminophen 5-325 MG per tablet   Commonly known as:  PERCOCET   Stopped by:  Kinjal Chiu PA-C                Where to get your medicines      Some of these will need a paper prescription and others can be bought over the counter.  Ask your nurse if you have questions.     Bring a paper prescription for each of these medications     traMADol 50 MG tablet                Primary Care Provider Office Phone # Fax #    [facility] [phone]       [address] [address]        Equal Access to Services     [facility]: Hadii aad ku hadasho Soomaali, waaxda luqadaha, qaybta kaalmada adeegyada, andre salinas hayparvez arriola . So [facility] [phone].    ATENCIÓN: Si habla español, tiene a murillo disposición servicios gratuitos de asistencia lingüística. Ll[facility] [phone].    We comply with applicable federal civil rights laws and [place] laws. We do not discriminate on the basis of race, color, national origin, age, disability sex, sexual orientation or gender identity.            Thank you!     Thank you for choosing [facility]  for your care. Our goal is always to provide you with excellent care. Hearing back from our patients is one way we can continue to improve our services. Please take a few minutes to complete the written survey that you may receive in the mail after your visit with us. Thank you!             Your Updated Medication List - Protect others around you: Learn how to safely use, store and throw away your medicines at www.disposemymeds.org.          This list is accurate as of: 8/29/17 11:39 AM.  Always use your most recent med list.                   Brand Name Dispense Instructions for use Diagnosis    ALEVE PO      Take by mouth daily as needed for moderate pain        ibuprofen 600 MG tablet    ADVIL/MOTRIN    20  tablet    Take 1 tablet (600 mg) by mouth every 6 hours as needed for pain        MIRENA (52 MG) 20 MCG/24HR IUD   Generic drug:  levonorgestrel     1 Intra Uterine Device    intrauterine uterine device placed    Surveillance of previously prescribed intrauterine contraceptive device, Insertion of IUD       * traMADol 50 MG tablet    ULTRAM    15 tablet    Take 1 tablet (50 mg) by mouth every 6 hours as needed for moderate pain    Chronic pain of right knee       * traMADol 50 MG tablet    ULTRAM    20 tablet    Take 1 tablet (50 mg) by mouth every 6 hours as needed for moderate pain    Chest wall pain       * traMADol 50 MG tablet    ULTRAM    20 tablet    Take 1 tablet (50 mg) by mouth every 6 hours as needed for moderate pain    Chest wall contusion, unspecified laterality, initial encounter       * traMADol 50 MG tablet    ULTRAM    18 tablet    Take 1 tablet (50 mg) by mouth every 6 hours as needed for pain maximum 6 tablet(s) per day    Chest wall pain       TYLENOL PO      Take 1,000 mg by mouth every 6 hours as needed for mild pain or fever        * Notice:  This list has 4 medication(s) that are the same as other medications prescribed for you. Read the directions carefully, and ask your doctor or other care provider to review them with you.

## 2017-08-30 NOTE — TELEPHONE ENCOUNTER
Pt was given this message. Agrees with plan.     Nicole Botello RN  [facility]

## 2017-09-01 ENCOUNTER — THERAPY VISIT (OUTPATIENT)
Dept: PHYSICAL THERAPY | Facility: CLINIC | Age: 37
End: 2017-09-01
Payer: COMMERCIAL

## 2017-09-01 DIAGNOSIS — R07.89 COSTOCHONDRAL CHEST PAIN: Primary | ICD-10-CM

## 2017-09-01 DIAGNOSIS — M54.6 PAIN IN THORACIC SPINE: ICD-10-CM

## 2017-09-01 PROCEDURE — 97110 THERAPEUTIC EXERCISES: CPT | Mod: GP | Performed by: PHYSICAL THERAPIST

## 2017-09-01 PROCEDURE — 97161 PT EVAL LOW COMPLEX 20 MIN: CPT | Mod: GP | Performed by: PHYSICAL THERAPIST

## 2017-09-01 NOTE — MR AVS SNAPSHOT
After Visit Summary   9/1/2017    Eliana Anders    MRN: [id number]           Patient Information     Date Of Birth          1980        Visit Information        Provider Department      9/1/2017 12:30 PM Suellen Cervantes, PT [facility] [place]        Today's Diagnoses     Costochondral chest pain    -  1    Pain in thoracic spine           Follow-ups after your visit        Your next 10 appointments already scheduled     Sep 05, 2017  2:10 PM CDT   CAROLIN Extremity with Kai Self, YOSVANY   [facility] [place] ([facility])    [address] [place] [id number] [phone]            Sep 07, 2017 12:50 PM CDT   CAROLIN Extremity with Maggie Marrufo PT   [facility] [place] ([facility])    [address] [place] [phone] [phone]            Sep 11, 2017 12:50 PM CDT   CAROLIN Extremity with Suellen Cervantes PT   [facility] [place] ([facility])    [address] [place] [phone] [phone]            Sep 13, 2017  4:00 PM CDT   Office Visit with CARISA Austin [facility] ([facility])    [address] [place] [address] [address]  [facility] [phone]           Bring a current list of meds and any records pertaining to this visit. For Physicals, please bring immunization records and any forms needing to be filled out. Please arrive 10 minutes early to complete paperwork.            Sep 14, 2017 12:50 PM CDT   CAROLIN Extremity with Maggie Marrufo PT   [facility] [place] ([facility])    [address] [place] [id number] [phone]            Sep 18, 2017 12:10 PM CDT   CAROLIN Extremity with Maggie Marrufo PT   [facility] ([facility])    [address] [place] [phone]            Sep 21, 2017 12:50 PM CDT   CAROLIN Extremity with Maggie Marrufo PT   [facility] [place] ([facility])    [address] [place] [phone]              Who to contact     If you have questions or need follow up information about today's clinic visit or your schedule please contact [facility] PHYSICAL [facility] directly at [phone].  Normal or non-critical lab and imaging results will be communicated to you by [facility], letter or phone within 4 business days after the clinic has received the results. If you do not hear from us within 7 days, please contact the clinic through [employer] or phone. If you have a critical or abnormal lab result, we will notify you by phone as soon as possible.  Submit refill requests through SquadMail or call your pharmacy and they will forward the refill request to us. Please allow 3 business days for your refill to be completed.          Additional Information About Your Visit        [facility] Information     S[employer]Mail gives you secure access to your electronic health record. If you see a primary care provider, you can also send messages to your care team and make appointments. If you have questions, please call your primary care clinic.  If you do not have a primary care provider, please call [phone] and they will assist you.        Care EveryWhere ID     This is your Care EveryWhere ID. This could be used by other organizations to access your [facility] medical records  [id number]         Blood Pressure from Last 3 Encounters:   08/29/17 (!) 138/92   08/24/17 132/89   08/14/17 125/87    Weight from Last 3 Encounters:   08/29/17 94.8 kg (208 lb 14.4 oz)   08/24/17 94.8 kg (209 lb)   08/14/17 95.3 kg (210 lb 1.6 oz)              We Performed the  Following     HC PT EVAL, LOW COMPLEXITY     THERAPEUTIC EXERCISES        Primary Care Provider Office Phone # Fax #    [facility] [phone]       [address] [place] [address]        Equal Access to Services     KEL BURNETT : Hadii aad ku hadpatholli Soadrianali, waaxda luqadaha, qaybta kaalmada adekendrada, andre vaniin hayaajuan m barker cabrera flako senior. So [facility] [phone].    ATENCIÓN: Si habla español, tiene a murillo disposición servicios gratuitos de asistencia lingüística. Llame al [phone].    We comply with applicable federal civil rights laws and [place] laws. We do not discriminate on the basis of race, color, national origin, age, disability sex, sexual orientation or gender identity.            Thank you!     Thank you for choosing [facility] PHYSICAL THERAPY [place]  for your care. Our goal is always to provide you with excellent care. Hearing back from our patients is one way we can continue to improve our services. Please take a few minutes to complete the written survey that you may receive in the mail after your visit with us. Thank you!             Your Updated Medication List - Protect others around you: Learn how to safely use, store and throw away your medicines at www.disposemymeds.org.          This list is accurate as of: 9/1/17  4:37 PM.  Always use your most recent med list.                   Brand Name Dispense Instructions for use Diagnosis    ALEVE PO      Take by mouth daily as needed for moderate pain        ibuprofen 600 MG tablet    ADVIL/MOTRIN    20 tablet    Take 1 tablet (600 mg) by mouth every 6 hours as needed for pain        MIRENA (52 MG) 20 MCG/24HR IUD   Generic drug:  levonorgestrel     1 Intra Uterine Device    intrauterine uterine device placed    Surveillance of previously prescribed intrauterine contraceptive device, Insertion of IUD       * traMADol 50 MG tablet    ULTRAM    15 tablet    Take 1 tablet (50 mg) by  mouth every 6 hours as needed for moderate pain    Chronic pain of right knee       * traMADol 50 MG tablet    ULTRAM    20 tablet    Take 1 tablet (50 mg) by mouth every 6 hours as needed for moderate pain    Chest wall pain       * traMADol 50 MG tablet    ULTRAM    20 tablet    Take 1 tablet (50 mg) by mouth every 6 hours as needed for moderate pain    Chest wall contusion, unspecified laterality, initial encounter       * traMADol 50 MG tablet    ULTRAM    18 tablet    Take 1 tablet (50 mg) by mouth every 6 hours as needed for pain maximum 6 tablet(s) per day    Chest wall pain       TYLENOL PO      Take 1,000 mg by mouth every 6 hours as needed for mild pain or fever        * Notice:  This list has 4 medication(s) that are the same as other medications prescribed for you. Read the directions carefully, and ask your doctor or other care provider to review them with you.
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
## 2017-09-05 ENCOUNTER — THERAPY VISIT (OUTPATIENT)
Dept: PHYSICAL THERAPY | Facility: CLINIC | Age: 37
End: 2017-09-05
Payer: COMMERCIAL

## 2017-09-05 DIAGNOSIS — M54.6 PAIN IN THORACIC SPINE: ICD-10-CM

## 2017-09-05 DIAGNOSIS — R07.89 COSTOCHONDRAL CHEST PAIN: ICD-10-CM

## 2017-09-05 PROCEDURE — 97140 MANUAL THERAPY 1/> REGIONS: CPT | Mod: GP | Performed by: PHYSICAL THERAPIST

## 2017-09-05 PROCEDURE — 97110 THERAPEUTIC EXERCISES: CPT | Mod: GP | Performed by: PHYSICAL THERAPIST

## 2017-09-06 ENCOUNTER — TELEPHONE (OUTPATIENT)
Dept: FAMILY MEDICINE | Facility: CLINIC | Age: 37
End: 2017-09-06

## 2017-09-06 NOTE — TELEPHONE ENCOUNTER
Looks like PA was already done/denied and the patient was informed. I notified pharmacy of denial. Closing encounter.

## 2017-09-06 NOTE — TELEPHONE ENCOUNTER
Prior Authorization needed on:      Medication:  Lidocaine Patch Dose:  5%    Pharmacy confirmed as   [facility] [address] [address] [address]  Phone: [phone] Fax: [phone]      Insurance Name:  [employer]   Insurance Phone: [phone]  Insurance Patient ID: [id number]    Alternatives Suggested:  None given     Donna Merino September 6, 2017 at 8:10 AM

## 2017-09-07 ENCOUNTER — THERAPY VISIT (OUTPATIENT)
Dept: PHYSICAL THERAPY | Facility: CLINIC | Age: 37
End: 2017-09-07
Payer: COMMERCIAL

## 2017-09-07 DIAGNOSIS — R07.89 COSTOCHONDRAL CHEST PAIN: ICD-10-CM

## 2017-09-07 DIAGNOSIS — M54.6 PAIN IN THORACIC SPINE: ICD-10-CM

## 2017-09-07 PROCEDURE — 97110 THERAPEUTIC EXERCISES: CPT | Mod: GP | Performed by: PHYSICAL THERAPIST

## 2017-09-07 PROCEDURE — 97140 MANUAL THERAPY 1/> REGIONS: CPT | Mod: GP | Performed by: PHYSICAL THERAPIST

## 2017-09-07 NOTE — MR AVS SNAPSHOT
After Visit Summary   9/7/2017    Eliana Anders    MRN: [id number]           Patient Information     Date Of Birth          1980        Visit Information        Provider Department      9/7/2017 12:50 PM Maggie Marrufo, PT [facility] [place]        Today's Diagnoses     Costochondral chest pain        Pain in thoracic spine           Follow-ups after your visit        Your next 10 appointments already scheduled     Sep 11, 2017 12:50 PM CDT   CAROLIN Extremity with Suellen Cervantes PT   [facility] [place] ([facility])    [address] [place] [phone]            Sep 13, 2017  4:00 PM CDT   Office Visit with CARISA Austin [facility] ([facility])    [address] [place] [phone]           Bring a current list of meds and any records pertaining to this visit. For Physicals, please bring immunization records and any forms needing to be filled out. Please arrive 10 minutes early to complete paperwork.            Sep 14, 2017 12:50 PM CDT   CAROLIN Extremity with Maggie Marrufo PT   [facility] [place] ([facility])    [address] [place] [phone]            Sep 18, 2017 12:10 PM CDT   CAROLIN Extremity with Maggie Marrufo PT   [facility] [place] ([facility])    [address] [place] [phone]            Sep 21, 2017 12:50 PM CDT   CAROLIN Extremity with Maggie Marrufo PT   [facility] [place] ([facility])    [address] [place] [phone]              Who to contact     If you have questions or need follow up information about today's  clinic visit or your schedule please contact [facility] PHYSICAL THERAPY [place] directly at [phone].  Normal or non-critical lab and imaging results will be communicated to you by MyChart, letter or phone within 4 business days after the clinic has received the results. If you do not hear from us within 7 days, please contact the clinic through [employer] or phone. If you have a critical or abnormal lab result, we will notify you by phone as soon as possible.  Submit refill requests through [employer]Works or call your pharmacy and they will forward the refill request to us. Please allow 3 business days for your refill to be completed.          Additional Information About Your Visit        [employer] Information     [employer]Works gives you secure access to your electronic health record. If you see a primary care provider, you can also send messages to your care team and make appointments. If you have questions, please call your primary care clinic.  If you do not have a primary care provider, please call [phone] and they will assist you.        Care EveryWhere ID     This is your Care EveryWhere ID. This could be used by other organizations to access your [facility] medical records  [id number]         Blood Pressure from Last 3 Encounters:   08/29/17 (!) 138/92   08/24/17 132/89   08/14/17 125/87    Weight from Last 3 Encounters:   08/29/17 94.8 kg (208 lb 14.4 oz)   08/24/17 94.8 kg (209 lb)   08/14/17 95.3 kg (210 lb 1.6 oz)              Today, you had the following     No orders found for display       Primary Care Provider Office Phone # Fax #    [facility] [phone]       [address] [place] [address]        Equal Access to Services     ROMY Regency MeridianGABBY : Hadii radha Macario, georgette dahl, qaandre mcmahon. So [facility] [phone].    ATENCIÓN: Si habla español, tiene a murillo disposición servicios  harley de asistencia lingüística. Jason mortensen [phone].    We comply with applicable federal civil rights laws and [place] laws. We do not discriminate on the basis of race, color, national origin, age, disability sex, sexual orientation or gender identity.            Thank you!     Thank you for choosing [facility] [place]  for your care. Our goal is always to provide you with excellent care. Hearing back from our patients is one way we can continue to improve our services. Please take a few minutes to complete the written survey that you may receive in the mail after your visit with us. Thank you!             Your Updated Medication List - Protect others around you: Learn how to safely use, store and throw away your medicines at www.disposemymeds.org.          This list is accurate as of: 9/7/17  1:46 PM.  Always use your most recent med list.                   Brand Name Dispense Instructions for use Diagnosis    ALEVE PO      Take by mouth daily as needed for moderate pain        ibuprofen 600 MG tablet    ADVIL/MOTRIN    20 tablet    Take 1 tablet (600 mg) by mouth every 6 hours as needed for pain        MIRENA (52 MG) 20 MCG/24HR IUD   Generic drug:  levonorgestrel     1 Intra Uterine Device    intrauterine uterine device placed    Surveillance of previously prescribed intrauterine contraceptive device, Insertion of IUD       * traMADol 50 MG tablet    ULTRAM    15 tablet    Take 1 tablet (50 mg) by mouth every 6 hours as needed for moderate pain    Chronic pain of right knee       * traMADol 50 MG tablet    ULTRAM    20 tablet    Take 1 tablet (50 mg) by mouth every 6 hours as needed for moderate pain    Chest wall pain       * traMADol 50 MG tablet    ULTRAM    20 tablet    Take 1 tablet (50 mg) by mouth every 6 hours as needed for moderate pain    Chest wall contusion, unspecified laterality, initial encounter       * traMADol 50 MG tablet    ULTRAM    18 tablet    Take 1  tablet (50 mg) by mouth every 6 hours as needed for pain maximum 6 tablet(s) per day    Chest wall pain       TYLENOL PO      Take 1,000 mg by mouth every 6 hours as needed for mild pain or fever        * Notice:  This list has 4 medication(s) that are the same as other medications prescribed for you. Read the directions carefully, and ask your doctor or other care provider to review them with you.

## 2017-09-13 ENCOUNTER — OFFICE VISIT (OUTPATIENT)
Dept: FAMILY MEDICINE | Facility: CLINIC | Age: 37
End: 2017-09-13
Payer: COMMERCIAL

## 2017-09-13 VITALS
BODY MASS INDEX: 32.77 KG/M2 | DIASTOLIC BLOOD PRESSURE: 102 MMHG | TEMPERATURE: 98.5 F | HEART RATE: 91 BPM | SYSTOLIC BLOOD PRESSURE: 150 MMHG | OXYGEN SATURATION: 96 % | WEIGHT: 209.2 LBS

## 2017-09-13 DIAGNOSIS — S20.219D CHEST WALL CONTUSION, UNSPECIFIED LATERALITY, SUBSEQUENT ENCOUNTER: Primary | ICD-10-CM

## 2017-09-13 PROCEDURE — 99214 OFFICE O/P EST MOD 30 MIN: CPT | Performed by: NURSE PRACTITIONER

## 2017-09-13 NOTE — MR AVS SNAPSHOT
After Visit Summary   9/13/2017    Eliana Anders    MRN: [id number]           Patient Information     Date Of Birth          1980        Visit Information        Provider Department      9/13/2017 4:00 PM Martha Davis APRN CNP [facility]        Today's Diagnoses     Chest wall contusion, unspecified laterality, subsequent encounter    -  1      Care Instructions    Please have Dr. Frank from Occupational Medicine complete your disability paperwork for work restrictions  Continue with physical therapy and I will talk to them to work on deep breaths  Ibuprofen 600 mg, three hours later tylenol, three hours later ibuprofen, etc  Maximum ibuprofen is 2400 mg per day - ideally more 1800 mg  Let me know if the alternating medications is not helpful and then we can mobic  Also can try volatren gel - if this is covered and you pick it up, please call for instructions because I don't want you to combine it with ibuprofen as you are taking it right now    Come to physical next week          Follow-ups after your visit        Your next 10 appointments already scheduled     Sep 14, 2017 12:50 PM CDT   CAROLIN Extremity with Maggie Marrufo PT   [facility] ( [facility]    [address] [place] [address]   [phone]            Sep 18, 2017 12:10 PM CDT   CAROLIN Extremity with Maggie Marrufo PT   [facility] ( [facility]    [address] [place] [address]   [phone]            Sep 21, 2017 12:50 PM CDT   CAROLIN Extremity with Maggie Marrufo PT   [facility] ( [facility]    [address] [place] [address]   [phone]            Sep 25, 2017  3:40 PM CDT   Office Visit with Anuradha Ku PA-C   [facility]  ([facility]    [address] [place] [id number] [phone]           Bring a current list of meds and any records pertaining to this visit. For Physicals, please bring immunization records and any forms needing to be filled out. Please arrive 10 minutes early to complete paperwork.              Who to contact     If you have questions or need follow up information about today's clinic visit or your schedule please contact [facility] directly at [phone].  Normal or non-critical lab and imaging results will be communicated to you by Content360hart, letter or phone within 4 business days after the clinic has received the results. If you do not hear from us within 7 days, please contact the clinic through R&L or phone. If you have a critical or abnormal lab result, we will notify you by phone as soon as possible.  Submit refill requests through R&L or call your pharmacy and they will forward the refill request to us. Please allow 3 business days for your refill to be completed.          Additional Information About Your Visit        Content360hart Information     R&L gives you secure access to your electronic health record. If you see a primary care provider, you can also send messages to your care team and make appointments. If you have questions, please call your primary care clinic.  If you do not have a primary care provider, please call [phone] and they will assist you.        Care EveryWhere ID     This is your Care EveryWhere ID. This could be used by other organizations to access your [facility] medical records  [id number]        Your Vitals Were     Pulse Temperature Pulse Oximetry BMI (Body Mass Index)          91 98.5  F (36.9  C) (Oral) 96% 32.77 kg/m2         Blood Pressure from Last 3 Encounters:   09/13/17 (!) 150/102   08/29/17 (!) 138/92   08/24/17 132/89    Weight from Last 3 Encounters:   09/13/17 209 lb 3.2 oz (94.9 kg)    08/29/17 208 lb 14.4 oz (94.8 kg)   08/24/17 209 lb (94.8 kg)              Today, you had the following     No orders found for display         Today's Medication Changes          These changes are accurate as of: 9/13/17  4:50 PM.  If you have any questions, ask your nurse or doctor.               Start taking these medicines.        Dose/Directions    diclofenac 1 % Gel topical gel   Commonly known as:  VOLTAREN   Used for:  Chest wall contusion, unspecified laterality, subsequent encounter   Started by:  Martha Davis APRN CNP        Apply 4 grams to knees or 2 grams to hands four times daily using enclosed dosing card.   Quantity:  100 g   Refills:  1            Where to get your medicines      These medications were sent to [facility] [id number] AT [address] [address]     Phone:  [phone]     diclofenac 1 % Gel topical gel                Primary Care Provider Office Phone # Fax #    [facility] [phone]       [address] [facility] [address] [address]        Equal Access to Services     ROMY BURNETT AH: Hadii aad ku hadasho Soomaali, waaxda luqadaha, qaybta kaalmada adeegyada, andre salinas hayparvez arriola . So [facility] [phone].    ATENCIÓN: Si habla español, tiene a murillo disposición servicios gratuitos de asistencia lingüística. Elliotame al [phone].    We comply with applicable federal civil rights laws and [place] laws. We do not discriminate on the basis of race, color, national origin, age, disability sex, sexual orientation or gender identity.            Thank you!     Thank you for choosing [facility]  for your care. Our goal is always to provide you with excellent care. Hearing back from our patients is one way we can continue to improve our services. Please take a few minutes to complete the written survey that you may receive in the mail after your  visit with us. Thank you!             Your Updated Medication List - Protect others around you: Learn how to safely use, store and throw away your medicines at www.disposemymeds.org.          This list is accurate as of: 9/13/17  4:50 PM.  Always use your most recent med list.                   Brand Name Dispense Instructions for use Diagnosis    ALEVE PO      Take by mouth daily as needed for moderate pain        diclofenac 1 % Gel topical gel    VOLTAREN    100 g    Apply 4 grams to knees or 2 grams to hands four times daily using enclosed dosing card.    Chest wall contusion, unspecified laterality, subsequent encounter       ibuprofen 600 MG tablet    ADVIL/MOTRIN    20 tablet    Take 1 tablet (600 mg) by mouth every 6 hours as needed for pain        MIRENA (52 MG) 20 MCG/24HR IUD   Generic drug:  levonorgestrel     1 Intra Uterine Device    intrauterine uterine device placed    Surveillance of previously prescribed intrauterine contraceptive device, Insertion of IUD       * traMADol 50 MG tablet    ULTRAM    15 tablet    Take 1 tablet (50 mg) by mouth every 6 hours as needed for moderate pain    Chronic pain of right knee       * traMADol 50 MG tablet    ULTRAM    20 tablet    Take 1 tablet (50 mg) by mouth every 6 hours as needed for moderate pain    Chest wall pain       * traMADol 50 MG tablet    ULTRAM    20 tablet    Take 1 tablet (50 mg) by mouth every 6 hours as needed for moderate pain    Chest wall contusion, unspecified laterality, initial encounter       * traMADol 50 MG tablet    ULTRAM    18 tablet    Take 1 tablet (50 mg) by mouth every 6 hours as needed for pain maximum 6 tablet(s) per day    Chest wall pain       TYLENOL PO      Take 1,000 mg by mouth every 6 hours as needed for mild pain or fever        * Notice:  This list has 4 medication(s) that are the same as other medications prescribed for you. Read the directions carefully, and ask your doctor or other care provider to review them with  you.
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
## 2017-09-13 NOTE — PROGRESS NOTES
SUBJECTIVE:   Eliana Anders is a 37 year old female who presents to clinic today for the following health issues:    CHEST PAIN     Onset: August 9, 2017    Description:   Location:  entire chest  Character: sharp, tight, and achey  Radiation: none  Duration: intermittent     Intensity: moderate    Progression of Symptoms:  improving and same    Accompanying Signs & Symptoms:  Shortness of breath: YES, can not breathe much  Sweating: YES- at night more than usual   Nausea/vomiting: no   Lightheadedness: no   Palpitations: no  Fever/Chills: no   Cough: YES  Heartburn: no     History:   Family history of heart disease no   Tobacco use: YES- started back up again.     Precipitating factors:   Worse with exertion: YES  Worse with deep breaths :  YES  Related to food: No, but sometimes feel food is going down slow    Alleviating factors:  None       Therapies Tried and outcome: PT and helps a bit    Saw occupational medicine through work and recommended restrictions through 10/3/17    Works at [employer] currently at [employer]  Daughter in second grade at [employer]    Problem list and histories reviewed & adjusted, as indicated.  Additional history: as documented    Reviewed and updated as needed this visit by clinical staffTobacco  Allergies  Meds  Med Hx  Surg Hx  Fam Hx  Soc Hx      Reviewed and updated as needed this visit by Provider         ROS:  Constitutional, HEENT, cardiovascular, pulmonary, gi and gu systems are negative, except as otherwise noted.      OBJECTIVE:   BP (!) 150/102  Pulse 91  Temp 98.5  F (36.9  C) (Oral)  Wt 209 lb 3.2 oz (94.9 kg)  SpO2 96%  BMI 32.77 kg/m2  Body mass index is 32.77 kg/(m^2).  GENERAL: healthy, alert and no distress  HENT: ear canals and TM's normal, nose and mouth without ulcers or lesions  NECK: no adenopathy, no asymmetry, masses, or scars and thyroid normal to palpation  RESP: lungs clear to auscultation - no rales, rhonchi or wheezes and  decreased breath sounds throughout but particularly bases; splints chest with deep breaths  CV: regular rate and rhythm, normal S1 S2, no S3 or S4, no murmur, click or rub, no peripheral edema and peripheral pulses strong  ABDOMEN: soft, nontender, no hepatosplenomegaly, no masses and bowel sounds normal  MS: swelling and tenderness in approx 7 cm area directly over sternum  SKIN: no suspicious lesions or rashes  NEURO: Normal strength and tone, mentation intact and speech normal  PSYCH: mentation appears normal, affect normal/bright    Diagnostic Test Results:  none   Please note greater than 50% of this 25 minute appointment were spent in counseling with the patient of the issues described above in the history of present illness and in the plan, including role for physical therapy and the occupational medicine with work recommendations, pain management options, health maintenance needs.  ASSESSMENT/PLAN:     (Q03.428L) Chest wall contusion, unspecified laterality, subsequent encounter  (primary encounter diagnosis)  Comment:   Plan: diclofenac (VOLTAREN) 1 % GEL topical gel        Has had functional assessment with physician through occupational medicine and I recommended that provider completed the required paperwork for leave as he has already made this recommendation.  Try voltaren gel if covered, but make sure to call first for instructions because she is taking daily ibuprofen currently.  Had been taking ibuprofen and tylenol all at once and recommended alternating for better coverage.  Discussed concerns with prolonged opioid use and advisement against this as well as declined to prescribe for this.  Patient is having decreased lung expansion due to pain.  Will route to physical therapy to set patient up with incentive spirometry.    Due for pap - patient was scheduled for next week  Patient Instructions   Please have Dr. Frank from Occupational Medicine complete your disability paperwork for work  restrictions  Continue with physical therapy and I will talk to them to work on deep breaths  Ibuprofen 600 mg, three hours later tylenol, three hours later ibuprofen, etc  Maximum ibuprofen is 2400 mg per day - ideally more 1800 mg  Let me know if the alternating medications is not helpful and then we can mobic  Also can try volatren gel - if this is covered and you pick it up, please call for instructions because I don't want you to combine it with ibuprofen as you are taking it right now    Come to physical next week      CARISA Ware [facility]

## 2017-09-13 NOTE — PATIENT INSTRUCTIONS
Please have Dr. Frank from Occupational Medicine complete your disability paperwork for work restrictions  Continue with physical therapy and I will talk to them to work on deep breaths  Ibuprofen 600 mg, three hours later tylenol, three hours later ibuprofen, etc  Maximum ibuprofen is 2400 mg per day - ideally more 1800 mg  Let me know if the alternating medications is not helpful and then we can mobic  Also can try volatren gel - if this is covered and you pick it up, please call for instructions because I don't want you to combine it with ibuprofen as you are taking it right now    Come to physical next week

## 2017-09-14 ENCOUNTER — THERAPY VISIT (OUTPATIENT)
Dept: PHYSICAL THERAPY | Facility: CLINIC | Age: 37
End: 2017-09-14
Payer: COMMERCIAL

## 2017-09-14 DIAGNOSIS — R07.89 COSTOCHONDRAL CHEST PAIN: ICD-10-CM

## 2017-09-14 DIAGNOSIS — M54.6 PAIN IN THORACIC SPINE: ICD-10-CM

## 2017-09-14 PROCEDURE — 97112 NEUROMUSCULAR REEDUCATION: CPT | Mod: GP | Performed by: PHYSICAL THERAPIST

## 2017-09-14 PROCEDURE — 97530 THERAPEUTIC ACTIVITIES: CPT | Mod: GP | Performed by: PHYSICAL THERAPIST

## 2017-09-14 PROCEDURE — 97110 THERAPEUTIC EXERCISES: CPT | Mod: GP | Performed by: PHYSICAL THERAPIST

## 2017-09-17 ENCOUNTER — HEALTH MAINTENANCE LETTER (OUTPATIENT)
Age: 37
End: 2017-09-17

## 2017-09-18 ENCOUNTER — TELEPHONE (OUTPATIENT)
Dept: FAMILY MEDICINE | Facility: CLINIC | Age: 37
End: 2017-09-18

## 2017-09-21 ENCOUNTER — THERAPY VISIT (OUTPATIENT)
Dept: PHYSICAL THERAPY | Facility: CLINIC | Age: 37
End: 2017-09-21
Payer: COMMERCIAL

## 2017-09-21 DIAGNOSIS — R07.89 COSTOCHONDRAL CHEST PAIN: ICD-10-CM

## 2017-09-21 DIAGNOSIS — M54.6 PAIN IN THORACIC SPINE: ICD-10-CM

## 2017-09-21 PROCEDURE — 97112 NEUROMUSCULAR REEDUCATION: CPT | Mod: GP | Performed by: PHYSICAL THERAPIST

## 2017-09-21 PROCEDURE — 97110 THERAPEUTIC EXERCISES: CPT | Mod: GP | Performed by: PHYSICAL THERAPIST

## 2017-09-21 PROCEDURE — 97530 THERAPEUTIC ACTIVITIES: CPT | Mod: GP | Performed by: PHYSICAL THERAPIST

## 2017-09-27 NOTE — PATIENT INSTRUCTIONS
Specialist scheduling  1) pulmonology for lung lesion - most important  2) gastroenterology for liver lesion and dilated duct  3) podiatrist for your feet    Take blood pressure outside of the clinic 3 times a week and send Brainliket message (or call) with the results after two weeks.  Depending on results, I may have you come back to talk about starting a medication  Please find your biometric screening results and get a copy to me    Preventive Health Recommendations  Female Ages 26 - 39  Yearly exam:   See your health care provider every year in order to    Review health changes.     Discuss preventive care.      Review your medicines if you your doctor has prescribed any.    Until age 30: Get a Pap test every three years (more often if you have had an abnormal result).    After age 30: Talk to your doctor about whether you should have a Pap test every 3 years or have a Pap test with HPV screening every 5 years.   You do not need a Pap test if your uterus was removed (hysterectomy) and you have not had cancer.  You should be tested each year for STDs (sexually transmitted diseases), if you're at risk.   Talk to your provider about how often to have your cholesterol checked.  If you are at risk for diabetes, you should have a diabetes test (fasting glucose).  Shots: Get a flu shot each year. Get a tetanus shot every 10 years.   Nutrition:     Eat at least 5 servings of fruits and vegetables each day.    Eat whole-grain bread, whole-wheat pasta and brown rice instead of white grains and rice.    Talk to your provider about Calcium and Vitamin D.     Lifestyle    Exercise at least 150 minutes a week (30 minutes a day, 5 days of the week). This will help you control your weight and prevent disease.    Limit alcohol to one drink per day.    No smoking.     Wear sunscreen to prevent skin cancer.    See your dentist every six months for an exam and cleaning.
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
## 2017-09-28 ENCOUNTER — OFFICE VISIT (OUTPATIENT)
Dept: FAMILY MEDICINE | Facility: CLINIC | Age: 37
End: 2017-09-28
Payer: COMMERCIAL

## 2017-09-28 ENCOUNTER — THERAPY VISIT (OUTPATIENT)
Dept: PHYSICAL THERAPY | Facility: CLINIC | Age: 37
End: 2017-09-28
Payer: COMMERCIAL

## 2017-09-28 VITALS
TEMPERATURE: 98.2 F | BODY MASS INDEX: 33 KG/M2 | WEIGHT: 210.7 LBS | DIASTOLIC BLOOD PRESSURE: 96 MMHG | SYSTOLIC BLOOD PRESSURE: 142 MMHG | HEART RATE: 76 BPM | OXYGEN SATURATION: 97 %

## 2017-09-28 DIAGNOSIS — Z00.01 ENCOUNTER FOR ROUTINE ADULT MEDICAL EXAM WITH ABNORMAL FINDINGS: Primary | ICD-10-CM

## 2017-09-28 DIAGNOSIS — R07.89 COSTOCHONDRAL CHEST PAIN: ICD-10-CM

## 2017-09-28 DIAGNOSIS — K76.9 LESION OF LIVER: ICD-10-CM

## 2017-09-28 DIAGNOSIS — Z12.4 SCREENING FOR CERVICAL CANCER: ICD-10-CM

## 2017-09-28 DIAGNOSIS — Z82.3: ICD-10-CM

## 2017-09-28 DIAGNOSIS — L84 CALLUS OF FOOT: ICD-10-CM

## 2017-09-28 DIAGNOSIS — R91.1 LESION OF RIGHT LUNG: ICD-10-CM

## 2017-09-28 DIAGNOSIS — K83.8 INTRAHEPATIC BILE DUCT DILATION: ICD-10-CM

## 2017-09-28 DIAGNOSIS — M54.6 PAIN IN THORACIC SPINE: ICD-10-CM

## 2017-09-28 DIAGNOSIS — R03.0 ELEVATED BLOOD PRESSURE READING WITHOUT DIAGNOSIS OF HYPERTENSION: ICD-10-CM

## 2017-09-28 PROCEDURE — G0145 SCR C/V CYTO,THINLAYER,RESCR: HCPCS | Performed by: NURSE PRACTITIONER

## 2017-09-28 PROCEDURE — 97530 THERAPEUTIC ACTIVITIES: CPT | Mod: GP | Performed by: PHYSICAL THERAPIST

## 2017-09-28 PROCEDURE — 99395 PREV VISIT EST AGE 18-39: CPT | Performed by: NURSE PRACTITIONER

## 2017-09-28 PROCEDURE — 97110 THERAPEUTIC EXERCISES: CPT | Mod: GP | Performed by: PHYSICAL THERAPIST

## 2017-09-28 PROCEDURE — 99213 OFFICE O/P EST LOW 20 MIN: CPT | Mod: 25 | Performed by: NURSE PRACTITIONER

## 2017-09-28 PROCEDURE — 87624 HPV HI-RISK TYP POOLED RSLT: CPT | Performed by: NURSE PRACTITIONER

## 2017-09-28 NOTE — MR AVS SNAPSHOT
After Visit Summary   9/28/2017    Eliana Anders    MRN: [id number]           Patient Information     Date Of Birth          1980        Visit Information        Provider Department      9/28/2017 1:30 PM Maggie Marrufo, PT [facility]        Today's Diagnoses     Costochondral chest pain        Pain in thoracic spine           Follow-ups after your visit        Your next 10 appointments already scheduled     Oct 02, 2017 12:10 PM CDT   CAROLIN Extremity with Kai Self PT   [facility] ([facility])    [address] [place] [id number] [phone]            Oct 04, 2017 12:10 PM CDT   CAROLIN Extremity with Maggie Marrufo PT   [facility] ([facility])    [address] [place] [address] [phone]            Oct 09, 2017 12:10 PM CDT   CAROLIN Extremity with Kai Self PT   [facility] ([facility])    [address] [place] [id number] [phone]            Oct 11, 2017 12:10 PM CDT   CAROLIN Extremity with Maggie Marrufo PT   [facility] ([facility])    [address] [place] [id number] [phone]              Who to contact     If you have questions or need follow up information about today's clinic visit or your schedule please contact [facility] directly at [phone].  Normal or non-critical lab and imaging results will be communicated to you by MyChart, letter or phone within 4 business days after the clinic has received the results. If you do not hear from us within 7 days, please contact the clinic through MyChart or phone. If you have a critical or abnormal lab result, we will notify you  by phone as soon as possible.  Submit refill requests through [employer] or call your pharmacy and they will forward the refill request to us. Please allow 3 business days for your refill to be completed.          Additional Information About Your Visit        [employer] Information     [employer] gives you secure access to your electronic health record. If you see a primary care provider, you can also send messages to your care team and make appointments. If you have questions, please call your primary care clinic.  If you do not have a primary care provider, please call [phone] and they will assist you.        Care EveryWhere ID     This is your Care EveryWhere ID. This could be used by other organizations to access your [place] medical records  [id number]         Blood Pressure from Last 3 Encounters:   09/28/17 (!) 142/96   09/13/17 (!) 150/102   08/29/17 (!) 138/92    Weight from Last 3 Encounters:   09/28/17 95.6 kg (210 lb 11.2 oz)   09/13/17 94.9 kg (209 lb 3.2 oz)   08/29/17 94.8 kg (208 lb 14.4 oz)              We Performed the Following     Therapeutic Activities     Therapeutic Exercises        Primary Care Provider Office Phone # Fax #    [facility] [phone]       [address] [place] [address]        Equal Access to Services     KEL BURNETT : Hadii radha ku hadasho Soadrianali, waaxda luqadaha, qaybta kaalmada adeegyada, andre salinas hayparvez arriola . So [facility] [phone].    ATENCIÓN: Si habla español, tiene a murillo disposición servicios gratuitos de asistencia lingüística. Llame al [phone].    We comply with applicable federal civil rights laws and [place] laws. We do not discriminate on the basis of race, color, national origin, age, disability sex, sexual orientation or gender identity.            Thank you!     Thank you for choosing [facility] [place]  for your care. Our goal is always to provide you with  excellent care. Hearing back from our patients is one way we can continue to improve our services. Please take a few minutes to complete the written survey that you may receive in the mail after your visit with us. Thank you!             Your Updated Medication List - Protect others around you: Learn how to safely use, store and throw away your medicines at www.disposemymeds.org.          This list is accurate as of: 9/28/17  5:42 PM.  Always use your most recent med list.                   Brand Name Dispense Instructions for use Diagnosis    ALEVE PO      Take by mouth daily as needed for moderate pain        diclofenac 1 % Gel topical gel    VOLTAREN    100 g    Apply 4 grams to knees or 2 grams to hands four times daily using enclosed dosing card.    Chest wall contusion, unspecified laterality, subsequent encounter       ibuprofen 600 MG tablet    ADVIL/MOTRIN    20 tablet    Take 1 tablet (600 mg) by mouth every 6 hours as needed for pain        MIRENA (52 MG) 20 MCG/24HR IUD   Generic drug:  levonorgestrel     1 Intra Uterine Device    intrauterine uterine device placed    Surveillance of previously prescribed intrauterine contraceptive device, Insertion of IUD       * traMADol 50 MG tablet    ULTRAM    15 tablet    Take 1 tablet (50 mg) by mouth every 6 hours as needed for moderate pain    Chronic pain of right knee       * traMADol 50 MG tablet    ULTRAM    20 tablet    Take 1 tablet (50 mg) by mouth every 6 hours as needed for moderate pain    Chest wall pain       * traMADol 50 MG tablet    ULTRAM    20 tablet    Take 1 tablet (50 mg) by mouth every 6 hours as needed for moderate pain    Chest wall contusion, unspecified laterality, initial encounter       * traMADol 50 MG tablet    ULTRAM    18 tablet    Take 1 tablet (50 mg) by mouth every 6 hours as needed for pain maximum 6 tablet(s) per day    Chest wall pain       TYLENOL PO      Take 1,000 mg by mouth every 6 hours as needed for mild pain or fever         * Notice:  This list has 4 medication(s) that are the same as other medications prescribed for you. Read the directions carefully, and ask your doctor or other care provider to review them with you.

## 2017-09-28 NOTE — MR AVS SNAPSHOT
After Visit Summary   9/28/2017    Eliana Anders    MRN: [id number]           Patient Information     Date Of Birth          1980        Visit Information        Provider Department      9/28/2017 3:00 PM Martha Davis APRN [facility]        Today's Diagnoses     Encounter for routine adult medical exam with abnormal findings    -  1    Screening for cervical cancer        Callus of foot        Lesion of right lung        Lesion of liver        Intrahepatic bile duct dilation        Elevated blood pressure reading without diagnosis of hypertension        Family history of cerebrovascular accident in sister          Care Instructions    Specialist scheduling  1) pulmonology for lung lesion - most important  2) gastroenterology for liver lesion and dilated duct  3) podiatrist for your feet    Take blood pressure outside of the clinic 3 times a week and send [employer] message (or call) with the results after two weeks.  Depending on results, I may have you come back to talk about starting a medication  Please find your biometric screening results and get a copy to me    Preventive Health Recommendations  Female Ages 26 - 39  Yearly exam:   See your health care provider every year in order to    Review health changes.     Discuss preventive care.      Review your medicines if you your doctor has prescribed any.    Until age 30: Get a Pap test every three years (more often if you have had an abnormal result).    After age 30: Talk to your doctor about whether you should have a Pap test every 3 years or have a Pap test with HPV screening every 5 years.   You do not need a Pap test if your uterus was removed (hysterectomy) and you have not had cancer.  You should be tested each year for STDs (sexually transmitted diseases), if you're at risk.   Talk to your provider about how often to have your cholesterol checked.  If you are at risk for diabetes, you should have a  diabetes test (fasting glucose).  Shots: Get a flu shot each year. Get a tetanus shot every 10 years.   Nutrition:     Eat at least 5 servings of fruits and vegetables each day.    Eat whole-grain bread, whole-wheat pasta and brown rice instead of white grains and rice.    Talk to your provider about Calcium and Vitamin D.     Lifestyle    Exercise at least 150 minutes a week (30 minutes a day, 5 days of the week). This will help you control your weight and prevent disease.    Limit alcohol to one drink per day.    No smoking.     Wear sunscreen to prevent skin cancer.    See your dentist every six months for an exam and cleaning.            Follow-ups after your visit        Additional Services     GASTROENTEROLOGY ADULT REF CONSULT ONLY       Preferred Location: [facility] [phone]      Please be aware that coverage of these services is subject to the terms and limitations of your health insurance plan.  Call member services at your health plan with any benefit or coverage questions.  Any procedures must be performed at a [facility] facility OR coordinated by your clinic's referral office.    Please bring the following with you to your appointment:    (1) Any X-Rays, CTs or MRIs which have been performed.  Contact the facility where they were done to arrange for  prior to your scheduled appointment.    (2) List of current medications   (3) This referral request   (4) Any documents/labs given to you for this referral            PODIATRY/FOOT & ANKLE SURGERY REFERRAL       Your provider has referred you to:   FM[facility]: [facility] [phone]   [email or web address]  [facility]: [facility] [phone]   [email or web address]    Please be aware that coverage of these services is subject to the terms and limitations of your health insurance plan.  Call member services at your health plan with any benefit or  coverage questions.      Please bring the following to your appointment:  >>   Any x-rays, CTs or MRIs which have been performed.  Contact the facility where they were done to arrange for  prior to your scheduled appointment.    >>   List of current medications   >>   This referral request   >>   Any documents/labs given to you for this referral            PULMONARY MEDICINE REFERRAL       Your provider has referred you to: [facility]: [place] [facility] [phone]   [email or web address]    Please be aware that coverage of these services is subject to the terms and limitations of your health insurance plan.  Call member services at your health plan with any benefit or coverage questions.      Please bring the following with you to your appointment:    (1) Any X-Rays, CTs or MRIs which have been performed.  Contact the facility where they were done to arrange for  prior to your scheduled appointment.    (2) List of current medications   (3) This referral request   (4) Any documents/labs given to you for this referral                  Your next 10 appointments already scheduled     Oct 02, 2017 12:10 PM CDT   CAROLIN Extremity with Kai Self PT   [facility]    [address] [place] [address]   [phone]            Oct 04, 2017 12:10 PM CDT   CAROLIN Extremity with Maggie Marrufo PT   [facility] ( [facility]    [address] [place] [address]   [phone]            Oct 09, 2017 12:10 PM CDT   CAROLIN Extremity with Kai Self PT   [facility] ( [facility]    [address] [place] [address]   [phone]            Oct 11, 2017 12:10 PM CDT   CAROLIN Extremity with Maggie Marrufo PT    [facility] ([facility])    [address]  [address]  [facility] [id number] [phone]              Who to contact     If you have questions or need follow up information about today's clinic visit or your schedule please contact [facility] directly at [phone].  Normal or non-critical lab and imaging results will be communicated to you by MyChart, letter or phone within 4 business days after the clinic has received the results. If you do not hear from us within 7 days, please contact the clinic through [email or web address] or phone. If you have a critical or abnormal lab result, we will notify you by phone as soon as possible.  Submit refill requests through [employer] or call your pharmacy and they will forward the refill request to us. Please allow 3 business days for your refill to be completed.          Additional Information About Your Visit        [email or web address] Information     [employer] gives you secure access to your electronic health record. If you see a primary care provider, you can also send messages to your care team and make appointments. If you have questions, please call your primary care clinic.  If you do not have a primary care provider, please call [phone] and they will assist you.        Care EveryWhere ID     This is your Care EveryWhere ID. This could be used by other organizations to access your [place] medical records  [id number]        Your Vitals Were     Pulse Temperature Pulse Oximetry BMI (Body Mass Index)          76 98.2  F (36.8  C) (Oral) 97% 33 kg/m2         Blood Pressure from Last 3 Encounters:   09/28/17 (!) 142/96   09/13/17 (!) 150/102   08/29/17 (!) 138/92    Weight from Last 3 Encounters:   09/28/17 210 lb 11.2 oz (95.6 kg)   09/13/17 209 lb 3.2 oz (94.9 kg)   08/29/17 208 lb 14.4 oz (94.8 kg)              We Performed the Following     GASTROENTEROLOGY ADULT REF CONSULT ONLY     HPV High Risk Types DNA  Cervical     Pap imaged thin layer screen with HPV - recommended age 30 - 65     PODIATRY/FOOT & ANKLE SURGERY REFERRAL     PULMONARY MEDICINE REFERRAL        Primary Care Provider Office Phone # Fax #    [facility] [phone]       [address] [address]        Equal Access to Services     KEL BURNETT : Hadii aad ku hadasho Soomaali, waaxda luqadaha, qaybta kaalmada adeegyada, waxay idiin hayaan adeeg latoyaisiah lashwetha senior. So [facility] [phone].    ATENCIÓN: Si habla español, tiene a murillo disposición servicios gratuitos de asistencia lingüística. Llame al [phone].    We comply with applicable federal civil rights laws and [place] laws. We do not discriminate on the basis of race, color, national origin, age, disability sex, sexual orientation or gender identity.            Thank you!     Thank you for choosing [facility]  for your care. Our goal is always to provide you with excellent care. Hearing back from our patients is one way we can continue to improve our services. Please take a few minutes to complete the written survey that you may receive in the mail after your visit with us. Thank you!             Your Updated Medication List - Protect others around you: Learn how to safely use, store and throw away your medicines at www.disposemymeds.org.          This list is accurate as of: 9/28/17  4:25 PM.  Always use your most recent med list.                   Brand Name Dispense Instructions for use Diagnosis    ALEVE PO      Take by mouth daily as needed for moderate pain        diclofenac 1 % Gel topical gel    VOLTAREN    100 g    Apply 4 grams to knees or 2 grams to hands four times daily using enclosed dosing card.    Chest wall contusion, unspecified laterality, subsequent encounter       ibuprofen 600 MG tablet    ADVIL/MOTRIN    20 tablet    Take 1 tablet (600 mg) by mouth every 6 hours as needed for pain        MIRENA (52 MG) 20  MCG/24HR IUD   Generic drug:  levonorgestrel     1 Intra Uterine Device    intrauterine uterine device placed    Surveillance of previously prescribed intrauterine contraceptive device, Insertion of IUD       * traMADol 50 MG tablet    ULTRAM    15 tablet    Take 1 tablet (50 mg) by mouth every 6 hours as needed for moderate pain    Chronic pain of right knee       * traMADol 50 MG tablet    ULTRAM    20 tablet    Take 1 tablet (50 mg) by mouth every 6 hours as needed for moderate pain    Chest wall pain       * traMADol 50 MG tablet    ULTRAM    20 tablet    Take 1 tablet (50 mg) by mouth every 6 hours as needed for moderate pain    Chest wall contusion, unspecified laterality, initial encounter       * traMADol 50 MG tablet    ULTRAM    18 tablet    Take 1 tablet (50 mg) by mouth every 6 hours as needed for pain maximum 6 tablet(s) per day    Chest wall pain       TYLENOL PO      Take 1,000 mg by mouth every 6 hours as needed for mild pain or fever        * Notice:  This list has 4 medication(s) that are the same as other medications prescribed for you. Read the directions carefully, and ask your doctor or other care provider to review them with you.

## 2017-09-28 NOTE — PROGRESS NOTES
Subjective:    HPI                    Objective:    System    Physical Exam    General     ROS    Assessment/Plan:      PROGRESS  REPORT    Progress reporting period is from 9-1-17 to 9-28-17.   Ms. Bishnu Anders has attended 6 times including today, working on initially range of motion and recently strengthening.    SUBJECTIVE  Subjective changes noted by patient:  She reports that she is doing significantly better.      Current pain level is just occasional quick pain through chest muscles.  May still feel strain in anterior area when turning in bed.    .     Previous pain level was severe at times, initial Pain level: 5/10.   Changes in function:  She has begun going to the gym and working on cardio and lighter lifting activity (under 10# as instructed by MD)  Adverse reaction to treatment or activity: None.    OBJECTIVE  Changes noted in objective findings:    Able to take deep breaths and reach fully overhead, retract scapula and extend arms back.    In the past week she has significantly increased ability to perform functional and light resistive movements.  Have initiated core strengthening including supine abdominal strengthening without problems  Posture is upright.  Transitions and movement fluid.            ASSESSMENT/PLAN  Updated problem list and treatment plan: Diagnosis 1:  Chest wall pain   Decreased strength - therapeutic exercise, therapeutic activities and gym program  Decreased function - therapeutic activities and home program    STG/LTGs have been met or progress has been made towards goals:  Yes (See Goal flow sheet completed today.)  Assessment of Progress: The patient's condition is improving.  Self Management Plans:  Patient has been instructed in a home treatment program.  Patient  has been instructed and is independent in self management of symptoms.  Patient has been instructed in a gym program for conditioning and strengthening        Recommendations:  Patient would benefit from a  limited number of physical therapy treatments at this time to progress strength/conditioning.  She would likely be able to begin to increase her work activities beyond 10# lifting.  Suggest a slow progression of duties over the next weeks.    Please refer to the daily flowsheet for treatment today, total treatment time and time spent performing 1:1 timed codes.

## 2017-09-28 NOTE — LETTER
October 13, 2017    Eliana Anders  [address]  [facility] [place] [facility] [address]    Dear Eliana,  We are happy to inform you that your PAP smear result from 09/28/17 is normal.  We are now able to do a follow up test on PAP smears. The DNA test is for HPV (Human Papilloma Virus). Cervical cancer is closely linked with certain types of HPV. Your result showed no evidence of high risk HPV.  Therefore we recommend you return in 3 years for your next pap smear.  You will still need to return to the clinic every year for an annual exam and other preventive tests.  Please contact the clinic at [phone] with any questions.  Sincerely,    CARISA Ware CNP/esh

## 2017-10-03 LAB
COPATH REPORT: NORMAL
PAP: NORMAL

## 2017-10-04 ENCOUNTER — THERAPY VISIT (OUTPATIENT)
Dept: PHYSICAL THERAPY | Facility: CLINIC | Age: 37
End: 2017-10-04
Payer: COMMERCIAL

## 2017-10-04 DIAGNOSIS — R07.89 COSTOCHONDRAL CHEST PAIN: ICD-10-CM

## 2017-10-04 DIAGNOSIS — M54.6 PAIN IN THORACIC SPINE: ICD-10-CM

## 2017-10-04 LAB
FINAL DIAGNOSIS: NORMAL
HPV HR 12 DNA CVX QL NAA+PROBE: NEGATIVE
HPV16 DNA SPEC QL NAA+PROBE: NEGATIVE
HPV18 DNA SPEC QL NAA+PROBE: NEGATIVE
SPECIMEN DESCRIPTION: NORMAL

## 2017-10-04 PROCEDURE — 97110 THERAPEUTIC EXERCISES: CPT | Mod: GP | Performed by: PHYSICAL THERAPIST

## 2017-10-06 DIAGNOSIS — R91.1 LUNG NODULE: Primary | ICD-10-CM

## 2017-10-06 NOTE — TELEPHONE ENCOUNTER
1.  Date/reason for appt: 10/13/17 - Lesion of right lung    2.  Referring provider: LINDSEY Davis      3.  Call to patient (Yes / No - short description): Yes    4.  Previous care at / records requested from: , [facility] - requested    5.  Recent Imagin17 CT chest/abd/pelv @ [facility] - requested     *Pt scheduled for CT chest on 10/13/17 @ 1400 - prior to appt with Dr Herzog @ 2427*

## 2017-10-09 ENCOUNTER — THERAPY VISIT (OUTPATIENT)
Dept: PHYSICAL THERAPY | Facility: CLINIC | Age: 37
End: 2017-10-09
Payer: COMMERCIAL

## 2017-10-09 DIAGNOSIS — M54.6 PAIN IN THORACIC SPINE: ICD-10-CM

## 2017-10-09 DIAGNOSIS — R07.89 COSTOCHONDRAL CHEST PAIN: ICD-10-CM

## 2017-10-09 PROCEDURE — 97110 THERAPEUTIC EXERCISES: CPT | Mod: GP | Performed by: PHYSICAL THERAPIST

## 2017-10-09 PROCEDURE — 97112 NEUROMUSCULAR REEDUCATION: CPT | Mod: GP | Performed by: PHYSICAL THERAPIST

## 2017-10-11 ENCOUNTER — THERAPY VISIT (OUTPATIENT)
Dept: PHYSICAL THERAPY | Facility: CLINIC | Age: 37
End: 2017-10-11
Payer: COMMERCIAL

## 2017-10-11 DIAGNOSIS — M54.6 PAIN IN THORACIC SPINE: Primary | ICD-10-CM

## 2017-10-11 PROCEDURE — 97112 NEUROMUSCULAR REEDUCATION: CPT | Mod: GP | Performed by: PHYSICAL THERAPIST

## 2017-10-11 PROCEDURE — 97110 THERAPEUTIC EXERCISES: CPT | Mod: GP | Performed by: PHYSICAL THERAPIST

## 2017-10-12 ENCOUNTER — OFFICE VISIT (OUTPATIENT)
Dept: PODIATRY | Facility: CLINIC | Age: 37
End: 2017-10-12
Payer: COMMERCIAL

## 2017-10-12 VITALS — BODY MASS INDEX: 31.83 KG/M2 | RESPIRATION RATE: 14 BRPM | WEIGHT: 210 LBS | HEIGHT: 68 IN

## 2017-10-12 DIAGNOSIS — M79.672 BILATERAL FOOT PAIN: ICD-10-CM

## 2017-10-12 DIAGNOSIS — M79.671 BILATERAL FOOT PAIN: ICD-10-CM

## 2017-10-12 DIAGNOSIS — L84 CALLUS OF FOOT: ICD-10-CM

## 2017-10-12 DIAGNOSIS — M77.42 METATARSALGIA OF BOTH FEET: ICD-10-CM

## 2017-10-12 DIAGNOSIS — M77.41 METATARSALGIA OF BOTH FEET: ICD-10-CM

## 2017-10-12 DIAGNOSIS — L85.3 DRY SKIN: Primary | ICD-10-CM

## 2017-10-12 PROCEDURE — 99203 OFFICE O/P NEW LOW 30 MIN: CPT | Performed by: PODIATRIST

## 2017-10-12 RX ORDER — AMMONIUM LACTATE 12 G/100G
CREAM TOPICAL 2 TIMES DAILY PRN
Qty: 385 G | Refills: 3 | Status: SHIPPED | OUTPATIENT
Start: 2017-10-12 | End: 2017-12-19

## 2017-10-12 NOTE — MR AVS SNAPSHOT
After Visit Summary   10/12/2017    Eliana Anders    MRN: [id number]           Patient Information     Date Of Birth          1980        Visit Information        Provider Department      10/12/2017 3:15 PM Stephane Og DPM [facility] [place]        Care Instructions    DR. OG'S CLINIC LOCATIONS     MONDAY / FRIDAY - [facility] WEDNESDAY - [place]   [address] [address] [place] [address]   [phone] / [phone] / [phone]       [facility] [phone]   [address] APPOINTMENTS: [phone]   [place] [address] BILLING QUESTIONS: [phone] / [phone]       CALLUS / CORN / IPK    When there is excessive friction or pressure on the skin, the body responds by making the skin thicker to protect the deeper structures from becoming exposed. While this works well to protect the deeper structures, the thickened skin can cause increased pressure and pain.    Callus: flat, diffuse thickened areas are simple calluses and they are usually caused by friction. Often these are the result of rubbing on a shoe or from going barefoot.    Corns: calluses with a central core on or between the toes are called corns. These result from prominent joints on toes rubbing together. Theses are a symptom of bone malalignment or illfitting shoes and will always recur unless the underlying bones are addressed surgically.    IPK: calluses with a central core on the ball of the foot are usually IPKs (intractable plantar keratosis). These are caused by excessive pressure from the metatarsals, the bones that make up the ball of the foot. Often one of these bones is too long or too prominent. Again, these will always recur unless the underlying bone issue is addressed. There is no cure for these. They will either go away by themselves, recur, or more could  develop.    Home Treatment  - File: Trim them down with a pumice stone or callus file a couple times a week to remove callus tissue that builds up. An electric callus removing device. Amope Pedi Perfect Electronic Pedicure Foot File and Callus Remover can be a good option.   - Moisturize: Lotion can be applied to soften the callus. A lactic acid or urea based cream such as Carmol, Kersal or Vanicream or thicker cream with shea butter are good options.   - Foot Gear: Good supportive shoes and minimizing barefoot walking can slow down callus formation and can decrease pain levels. Gel inserts can also provide padding to the bottom of the foot to prevent pain and slow recurrence. Toe spacers, toe covers, can custom orthotic inserts can be beneficial as well.  - Surgery: If there is a surgical pathology noted, such as a prominent bone, often this needs to be addressed surgically to minimize recurrence. However, sometimes the lesion simply migrates to another spot after surgery, so it is not a guaranteed cure.     If you cannot treat them yourself at home, call the home foot care nurses below:  [facility]  [phone] [facility]   [phone] [facility]   [phone]             [place] [facility] LOCATIONS  [facility]  [address]  [place] [address]  Phone: [phone]  Fax: [phone] [facility]  [address]  [place] [address]  Phone: [phone]   Fax: [phone]   [facility]  [address]  [place] [address]  Phone: [phone]  Fax: [phone] [facility]  [address]  [place] [address]  Phone: [phone]   Fax: [phone]   [facility]   [address]  [place] [address]  Phone: [phone]   Fax: [phone] * Please call any location listed to make an appointment for a casting/fitting. Your referral was sent to their  central office and they will all have the order on file.           Body Mass Index (BMI)  Many things can cause foot and ankle problems. Foot structure, activity level, foot mechanics and injuries are common causes of pain.  One very important issue that often goes unmentioned, is body weight.  Extra weight can cause increased stress on muscles, ligaments, bones and tendons.  Sometimes just a few extra pounds is all it takes to put one over her/his threshold. Without reducing that stress, it can be difficult to alleviate pain. Some people are uncomfortable addressing this issue, but we feel it is important for you to think about it. As Foot &  Ankle specialists, our job is addressing the lower extremity problem and possible causes. Regarding extra body weight, we encourage patients to discuss diet and weight management plans with their primary care doctors. It is this team approach that gives you the best opportunity for pain relief and getting you back on your feet.                Follow-ups after your visit        Your next 10 appointments already scheduled     Oct 12, 2017  3:15 PM CDT   New Visit with Stephane Doll DPM   [facility] ([facility])    [address] [place] [address] [phone]            Oct 13, 2017  2:00 PM CDT   (Arrive by 1:45 PM)   CT CHEST W/O CONTRAST with UCCT2   [facility] ([facility])    [address] [address] [phone]           Please bring any scans or X-rays taken at other hospitals, if similar tests were done. Also bring a list of your medicines, including vitamins, minerals and over-the-counter drugs. It is safest to leave personal items at home.  Be sure to tell your doctor:   If you have any allergies.   If there s any chance you are pregnant.   If you are breastfeeding.   If you have any special needs.  You do not need to do anything special to  prepare.  Please wear loose clothing, such as a sweat suit or jogging clothes. Avoid snaps, zippers and other metal. We may ask you to undress and put on a hospital gown.            Oct 13, 2017  2:30 PM CDT   (Arrive by 2:15 PM)   NEW LUNG NODULE with Susan Herzog MD   [facility] ([email or web address])    [address]  2nd Floor  [facility] [address]   [phone]            Oct 17, 2017  7:40 AM CDT   CAROLIN Extremity with Maggie Marrufo, PT   [facility] ([facility])    [address] [place] [address]   [phone]            Oct 25, 2017 12:10 PM CDT   CAROLIN Extremity with Maggie Marrufo, PT   [facility] ([facility])    [address] [place] [address]   [phone]            Nov 01, 2017 12:10 PM CDT   CAROLIN Extremity with Maggie Marrufo, PT   [facility] ([facility])    [address] [place] [address]   [phone]            Nov 08, 2017 12:10 PM CST   CAROLIN Extremity with Maggie Marrufo PT   [facility] ([facility])    [address] [place] [address]   [phone]            Nov 15, 2017 12:10 PM CST   CAROLIN Extremity with Maggie Marrufo PT   [facility] ([facility])    [address] [place] [address]   [phone]            Nov 22, 2017 12:10 PM CST   CAROLIN Extremity with Maggie Marrufo PT   [facility] ([facility])    [address] [place] [address]   [phone]            Nov 29, 2017 12:10 PM CST   CAROLIN Extremity with Maggie Marrufo, PT   [facility]    [address]  [address]  [facility] [id number] [phone]              Who to contact     If you have questions or need follow up information about today's clinic visit or your schedule please contact [facility] directly at [phone].  Normal or non-critical lab and imaging results will be communicated to you by MyChart, letter or phone within 4 business days after the clinic has received the results. If you do not hear from us within 7 days, please contact the clinic through MyChart or phone. If you have a critical or abnormal lab result, we will notify you by phone as soon as possible.  Submit refill requests through [employer] or call your pharmacy and they will forward the refill request to us. Please allow 3 business days for your refill to be completed.          Additional Information About Your Visit        [employer] Information     [employer] gives you secure access to your electronic health record. If you see a primary care provider, you can also send messages to your care team and make appointments. If you have questions, please call your primary care clinic.  If you do not have a primary care provider, please call [phone] and they will assist you.        Care EveryWhere ID     This is your Care EveryWhere ID. This could be used by other organizations to access your [facility] medical records  [id number]        Your Vitals Were     Respirations Height BMI (Body Mass Index)             14 5' 8\" (1.727 m) 31.93 kg/m2          Blood Pressure from Last 3 Encounters:   09/28/17 (!) 142/96   09/13/17 (!) 150/102   08/29/17 (!) 138/92    Weight from Last 3 Encounters:   10/12/17 210 lb (95.3 kg)   09/28/17 210 lb 11.2 oz (95.6 kg)   09/13/17 209 lb 3.2 oz (94.9 kg)              Today, you had the following     No orders found for display       Primary Care Provider Office Phone # Fax #    [facility] [phone]       " [address] [address]        Equal Access to Services     PEEROMY HORTENCIA : Hadii radha guaman akiholli Soadrianali, waripda luqadaha, qaperryta raeannwillielisette demarco, andre raviearnestisiah senior. So [facility] [phone].    ATENCIÓN: Si habla español, tiene a murillo disposición servicios gratuitos de asistencia lingüística. Llame al [phone].    We comply with applicable federal civil rights laws and [place] laws. We do not discriminate on the basis of race, color, national origin, age, disability, sex, sexual orientation, or gender identity.            Thank you!     Thank you for choosing [facility]  for your care. Our goal is always to provide you with excellent care. Hearing back from our patients is one way we can continue to improve our services. Please take a few minutes to complete the written survey that you may receive in the mail after your visit with us. Thank you!             Your Updated Medication List - Protect others around you: Learn how to safely use, store and throw away your medicines at www.disposemymeds.org.          This list is accurate as of: 10/12/17  2:22 PM.  Always use your most recent med list.                   Brand Name Dispense Instructions for use Diagnosis    ALEVE PO      Take by mouth daily as needed for moderate pain        diclofenac 1 % Gel topical gel    VOLTAREN    100 g    Apply 4 grams to knees or 2 grams to hands four times daily using enclosed dosing card.    Chest wall contusion, unspecified laterality, subsequent encounter       ibuprofen 600 MG tablet    ADVIL/MOTRIN    20 tablet    Take 1 tablet (600 mg) by mouth every 6 hours as needed for pain        MIRENA (52 MG) 20 MCG/24HR IUD   Generic drug:  levonorgestrel     1 Intra Uterine Device    intrauterine uterine device placed    Surveillance of previously prescribed intrauterine contraceptive device, Insertion of IUD       * traMADol 50 MG tablet    ULTRAM    15 tablet    Take  1 tablet (50 mg) by mouth every 6 hours as needed for moderate pain    Chronic pain of right knee       * traMADol 50 MG tablet    ULTRAM    20 tablet    Take 1 tablet (50 mg) by mouth every 6 hours as needed for moderate pain    Chest wall pain       * traMADol 50 MG tablet    ULTRAM    20 tablet    Take 1 tablet (50 mg) by mouth every 6 hours as needed for moderate pain    Chest wall contusion, unspecified laterality, initial encounter       * traMADol 50 MG tablet    ULTRAM    18 tablet    Take 1 tablet (50 mg) by mouth every 6 hours as needed for pain maximum 6 tablet(s) per day    Chest wall pain       TYLENOL PO      Take 1,000 mg by mouth every 6 hours as needed for mild pain or fever        * Notice:  This list has 4 medication(s) that are the same as other medications prescribed for you. Read the directions carefully, and ask your doctor or other care provider to review them with you.

## 2017-10-12 NOTE — NURSING NOTE
"Chief Complaint   Patient presents with     Foot Problems     callus issues       Initial Resp 14  Ht 5' 8\" (1.727 m)  Wt 210 lb (95.3 kg)  BMI 31.93 kg/m2 Estimated body mass index is 31.93 kg/(m^2) as calculated from the following:    Height as of this encounter: 5' 8\" (1.727 m).    Weight as of this encounter: 210 lb (95.3 kg).  Medication Reconciliation: complete  "

## 2017-10-12 NOTE — PROGRESS NOTES
ASSESSMENT/PLAN:  Encounter Diagnoses   Name Primary?     Dry skin Yes     Callus of foot      Bilateral foot pain      Metatarsalgia of both feet      (L85.3) Dry skin  (primary encounter diagnosis)  Plan: ammonium lactate (LAC-HYDRIN) 12 % cream          Pt is referred to the [facility] for prescription multi-density orthoses.      Felt metatarsal pad provided for the right foot    I told her that I can trim her calluses down, but she should check with her insurance and see if she has coverage.    Body mass index is 31.93 kg/(m^2).    Weight management plan: Patient was referred to their PCP to discuss a diet and exercise plan.      Stephane Doll DPM, FACFAS, MS    [facility] of Podiatry/Foot & Ankle Surgery      ____________________________________________________________________    HPI:         Chief Complaint: foot pain, both feet  Onset of problem: years  Pain/ discomfort is described as:  Deep ache, throbbing, shooting; she specifies the plantar forefoot  Ratin/10   Frequency:  daily    The pain is made worse with weight bearing activities  Previous treatment: soaking, trimming callus on right foot      *  Past Medical History:   Diagnosis Date     Drug abuse     marijuana use     Nonsenile cataract    *  *  Past Surgical History:   Procedure Laterality Date     ARTHROSCOPIC RECONSTRUCTION ANTERIOR CRUCIATE LIGAMENT Right 2015    Procedure: ARTHROSCOPIC RECONSTRUCTION ANTERIOR CRUCIATE LIGAMENT;  Surgeon: Tevin Cordero MD;  [facility]: [facility]     ARTHROSCOPY KNEE WITH MENISCAL REPAIR Right 2015    Procedure: ARTHROSCOPY KNEE WITH MENISCAL REPAIR;  Surgeon: Tevin Cordero MD;  Location: [facility]     C NONSPECIFIC PROCEDURE      rt ankle repair     HC REMOVAL GALLBLADDER       HC REMOVAL OF OVARIAN CYST(S)       ORTHOPEDIC SURGERY     *  *  Current Outpatient Prescriptions   Medication Sig Dispense Refill     ammonium  lactate (LAC-HYDRIN) 12 % cream Apply topically 2 times daily as needed for dry skin 385 g 3     diclofenac (VOLTAREN) 1 % GEL topical gel Apply 4 grams to knees or 2 grams to hands four times daily using enclosed dosing card. 100 g 1     traMADol (ULTRAM) 50 MG tablet Take 1 tablet (50 mg) by mouth every 6 hours as needed for pain maximum 6 tablet(s) per day 18 tablet 0     traMADol (ULTRAM) 50 MG tablet Take 1 tablet (50 mg) by mouth every 6 hours as needed for moderate pain 20 tablet 0     traMADol (ULTRAM) 50 MG tablet Take 1 tablet (50 mg) by mouth every 6 hours as needed for moderate pain 20 tablet 0     traMADol (ULTRAM) 50 MG tablet Take 1 tablet (50 mg) by mouth every 6 hours as needed for moderate pain 15 tablet 0     Acetaminophen (TYLENOL PO) Take 1,000 mg by mouth every 6 hours as needed for mild pain or fever       ibuprofen (ADVIL/MOTRIN) 600 MG tablet Take 1 tablet (600 mg) by mouth every 6 hours as needed for pain 20 tablet 0     Naproxen Sodium (ALEVE PO) Take by mouth daily as needed for moderate pain       MIRENA 20 MCG/24HR IU IUD intrauterine uterine device placed 1 Intra Uterine Device 0       ROS:     A 10-point review of systems was performed and is positive for that noted in the HPI and as seen below.  All other areas are negative.     Numbness in feet?  yes   Calf pain with walking? yes  Recent foot/ankle injury? no  Weight change over past 12 months? no  Self perception as overweight? no  Recent flu-like symptoms? no  Joint pain other than feet ? no    Social History: Employment:  yes;  Exercise/Physical activity:  no;  Tobacco use:  no  Social History     Social History     Marital status: Single     Spouse name: N/A     Number of children: 0     Years of education: N/A     Occupational History      [employer]     Social History Main Topics     Smoking status: Current Some Day Smoker     Packs/day: 0.50     Years: 11.00     Types: Cigarettes     Last attempt to quit:  "9/17/2009     Smokeless tobacco: Never Used      Comment: quit with preg     Alcohol use Yes      Comment: every 2 week     Drug use: No      Comment: not for a long time     Sexual activity: Yes     Partners: Male     Birth control/ protection: IUD      Comment: no BC     Other Topics Concern     Parent/Sibling W/ Cabg, Mi Or Angioplasty Before 65f 55m? Yes     Social History Narrative    Caffeine intake/servings daily - <1    Calcium intake/servings daily - 2-3    Exercise 1 times weekly - describe walking    Sunscreen used - Yes    Seatbelts used - Yes    Guns stored in the home - No    Self Breast Exam - Yes    Pap test up to date -  No    Eye exam up to date -  No    Dental exam up to date -  No    DEXA scan up to date -  Not Applicable    Flex Sig/Colonoscopy up to date -  Not Applicable    Mammography up to date -  Not Applicable    Immunizations reviewed and up to date - No, needs Tdap post partum    Abuse: Current or Past (Physical, Sexual or Emotional) - No    Do you feel safe in your environment - Yes    Do you cope well with stress - Yes    Do you suffer from insomnia - No    Last updated by: Amara Wright  9/24/2009                   Family history:  Family History   Problem Relation Age of Onset     C.A.D. Mother      MI & CHF     CANCER Maternal Grandmother      Hypertension Paternal Grandmother      CANCER Paternal Grandmother      ? cervical     CANCER Paternal Aunt      ? cervical       EXAM:    Vitals: Resp 14  Ht 5' 8\" (1.727 m)  Wt 210 lb (95.3 kg)  BMI 31.93 kg/m2  BMI: Body mass index is 31.93 kg/(m^2).  Height: 5' 8\"    Constitutional/ general:  Pt is in no apparent distress, appears well-nourished.  Cooperative with history and physical exam.     Vascular:  Pedal pulses are palpable bilaterally for both the DP and PT arteries.  CFT < 3 sec.  No edema.  Pedal hair growth noted.     Neuro:  Alert and oriented x 3. Coordinated gait.  Light touch sensation is intact to the L4, L5, S1 " distributions. No obvious deficits.  No evidence of neurological-based weakness, spasticity, or contracture in the lower extremities.     Derm: Normal texture and turgor.  No erythema, ecchymosis, or cyanosis.  No open lesions.  Generalized callusing plantar forefoot, bilateral.  Deeper, nucleated lesion sub right 3rd metatarsal head.  The core has been removed (she reported doing this herself)    Musculoskeletal:    Lower extremity muscle strength is normal.  Patient is ambulatory without an assistive device or brace .  No gross deformities.  Pain on palpation: bilateral forefoot          Stephane Doll DPM, FACFAS, MS [facility] Department of Podiatry/Foot & Ankle Surgery

## 2017-10-12 NOTE — LETTER
10/12/2017         RE: Eliana Anders  [address]  [facility] [address]        Dear Colleague,    Thank you for referring your patient, Eliana Anders, to the [facility]. Please see a copy of my visit note below.      ASSESSMENT/PLAN:  Encounter Diagnoses   Name Primary?     Dry skin Yes     Callus of foot      Bilateral foot pain      Metatarsalgia of both feet      (L85.3) Dry skin  (primary encounter diagnosis)  Plan: ammonium lactate (LAC-HYDRIN) 12 % cream          Pt is referred to the [facility] for prescription multi-density orthoses.      Felt metatarsal pad provided for the right foot    I told her that I can trim her calluses down, but she should check with her insurance and see if she has coverage.    Body mass index is 31.93 kg/(m^2).    Weight management plan: Patient was referred to their PCP to discuss a diet and exercise plan.      Stephane Doll DPM, FACFAS, MS    [facility]      ____________________________________________________________________    HPI:         Chief Complaint: foot pain, both feet  Onset of problem: years  Pain/ discomfort is described as:  Deep ache, throbbing, shooting; she specifies the plantar forefoot  Ratin/10   Frequency:  daily    The pain is made worse with weight bearing activities  Previous treatment: soaking, trimming callus on right foot      *  Past Medical History:   Diagnosis Date     Drug abuse     marijuana use     Nonsenile cataract    *  *  Past Surgical History:   Procedure Laterality Date     ARTHROSCOPIC RECONSTRUCTION ANTERIOR CRUCIATE LIGAMENT Right 2015    Procedure: ARTHROSCOPIC RECONSTRUCTION ANTERIOR CRUCIATE LIGAMENT;  Surgeon: Tevin Cordero MD;  [facility]: [facility]     ARTHROSCOPY KNEE WITH MENISCAL REPAIR Right 2015    Procedure: ARTHROSCOPY KNEE WITH MENISCAL REPAIR;  Surgeon: Gil  Tevin Norman MD;  Location: [facility]     C NONSPECIFIC PROCEDURE  2008    rt ankle repair     HC REMOVAL GALLBLADDER  1998     HC REMOVAL OF OVARIAN CYST(S)  1998     ORTHOPEDIC SURGERY     *  *  Current Outpatient Prescriptions   Medication Sig Dispense Refill     ammonium lactate (LAC-HYDRIN) 12 % cream Apply topically 2 times daily as needed for dry skin 385 g 3     diclofenac (VOLTAREN) 1 % GEL topical gel Apply 4 grams to knees or 2 grams to hands four times daily using enclosed dosing card. 100 g 1     traMADol (ULTRAM) 50 MG tablet Take 1 tablet (50 mg) by mouth every 6 hours as needed for pain maximum 6 tablet(s) per day 18 tablet 0     traMADol (ULTRAM) 50 MG tablet Take 1 tablet (50 mg) by mouth every 6 hours as needed for moderate pain 20 tablet 0     traMADol (ULTRAM) 50 MG tablet Take 1 tablet (50 mg) by mouth every 6 hours as needed for moderate pain 20 tablet 0     traMADol (ULTRAM) 50 MG tablet Take 1 tablet (50 mg) by mouth every 6 hours as needed for moderate pain 15 tablet 0     Acetaminophen (TYLENOL PO) Take 1,000 mg by mouth every 6 hours as needed for mild pain or fever       ibuprofen (ADVIL/MOTRIN) 600 MG tablet Take 1 tablet (600 mg) by mouth every 6 hours as needed for pain 20 tablet 0     Naproxen Sodium (ALEVE PO) Take by mouth daily as needed for moderate pain       MIRENA 20 MCG/24HR IU IUD intrauterine uterine device placed 1 Intra Uterine Device 0       ROS:     A 10-point review of systems was performed and is positive for that noted in the HPI and as seen below.  All other areas are negative.     Numbness in feet?  yes   Calf pain with walking? yes  Recent foot/ankle injury? no  Weight change over past 12 months? no  Self perception as overweight? no  Recent flu-like symptoms? no  Joint pain other than feet ? no    Social History: Employment:  yes;  Exercise/Physical activity:  no;  Tobacco use:  no  Social History     Social History     Marital status: Single     Spouse name:  "N/A     Number of children: 0     Years of education: N/A     Occupational History      [employer]     Social History Main Topics     Smoking status: Current Some Day Smoker     Packs/day: 0.50     Years: 11.00     Types: Cigarettes     Last attempt to quit: 9/17/2009     Smokeless tobacco: Never Used      Comment: quit with preg     Alcohol use Yes      Comment: every 2 week     Drug use: No      Comment: not for a long time     Sexual activity: Yes     Partners: Male     Birth control/ protection: IUD      Comment: no BC     Other Topics Concern     Parent/Sibling W/ Cabg, Mi Or Angioplasty Before 65f 55m? Yes     Social History Narrative    Caffeine intake/servings daily - <1    Calcium intake/servings daily - 2-3    Exercise 1 times weekly - describe walking    Sunscreen used - Yes    Seatbelts used - Yes    Guns stored in the home - No    Self Breast Exam - Yes    Pap test up to date -  No    Eye exam up to date -  No    Dental exam up to date -  No    DEXA scan up to date -  Not Applicable    Flex Sig/Colonoscopy up to date -  Not Applicable    Mammography up to date -  Not Applicable    Immunizations reviewed and up to date - No, needs Tdap post partum    Abuse: Current or Past (Physical, Sexual or Emotional) - No    Do you feel safe in your environment - Yes    Do you cope well with stress - Yes    Do you suffer from insomnia - No    Last updated by: Amara Wright  9/24/2009                   Family history:  Family History   Problem Relation Age of Onset     C.A.D. Mother      MI & CHF     CANCER Maternal Grandmother      Hypertension Paternal Grandmother      CANCER Paternal Grandmother      ? cervical     CANCER Paternal Aunt      ? cervical       EXAM:    Vitals: Resp 14  Ht 5' 8\" (1.727 m)  Wt 210 lb (95.3 kg)  BMI 31.93 kg/m2  BMI: Body mass index is 31.93 kg/(m^2).  Height: 5' 8\"    Constitutional/ general:  Pt is in no apparent distress, appears well-nourished.  Cooperative with " history and physical exam.     Vascular:  Pedal pulses are palpable bilaterally for both the DP and PT arteries.  CFT < 3 sec.  No edema.  Pedal hair growth noted.     Neuro:  Alert and oriented x 3. Coordinated gait.  Light touch sensation is intact to the L4, L5, S1 distributions. No obvious deficits.  No evidence of neurological-based weakness, spasticity, or contracture in the lower extremities.     Derm: Normal texture and turgor.  No erythema, ecchymosis, or cyanosis.  No open lesions.  Generalized callusing plantar forefoot, bilateral.  Deeper, nucleated lesion sub right 3rd metatarsal head.  The core has been removed (she reported doing this herself)    Musculoskeletal:    Lower extremity muscle strength is normal.  Patient is ambulatory without an assistive device or brace .  No gross deformities.  Pain on palpation: bilateral forefoot          Stephane Doll DPM, FACFAS, MS    [facility]                Again, thank you for allowing me to participate in the care of your patient.        Sincerely,        Stephane Doll DPM

## 2017-10-12 NOTE — PATIENT INSTRUCTIONS
DR. OG'S CLINIC LOCATIONS     [facility]   [address] [place] [address] [address] [place] [address]   [phone] / [phone] / [phone]       THURSDAY - [facility] SCHEDULE SURGERY: [phone]   [address] APPOINTMENTS: [phone]   [place] [address] BILLING QUESTIONS: [phone] / [phone]       CALLUS / CORN / IPK    When there is excessive friction or pressure on the skin, the body responds by making the skin thicker to protect the deeper structures from becoming exposed. While this works well to protect the deeper structures, the thickened skin can cause increased pressure and pain.    Callus: flat, diffuse thickened areas are simple calluses and they are usually caused by friction. Often these are the result of rubbing on a shoe or from going barefoot.    Corns: calluses with a central core on or between the toes are called corns. These result from prominent joints on toes rubbing together. Theses are a symptom of bone malalignment or illfitting shoes and will always recur unless the underlying bones are addressed surgically.    IPK: calluses with a central core on the ball of the foot are usually IPKs (intractable plantar keratosis). These are caused by excessive pressure from the metatarsals, the bones that make up the ball of the foot. Often one of these bones is too long or too prominent. Again, these will always recur unless the underlying bone issue is addressed. There is no cure for these. They will either go away by themselves, recur, or more could develop.    Home Treatment  - File: Trim them down with a pumice stone or callus file a couple times a week to remove callus tissue that builds up. An electric callus removing device. Amope Pedi Perfect Electronic Pedicure Foot File and Callus Remover can be a good option.   - Moisturize: Lotion can be applied to soften the callus.  A lactic acid or urea based cream such as Carmol, Kersal or Vanicream or thicker cream with shea butter are good options.   - Foot Gear: Good supportive shoes and minimizing barefoot walking can slow down callus formation and can decrease pain levels. Gel inserts can also provide padding to the bottom of the foot to prevent pain and slow recurrence. Toe spacers, toe covers, can custom orthotic inserts can be beneficial as well.  - Surgery: If there is a surgical pathology noted, such as a prominent bone, often this needs to be addressed surgically to minimize recurrence. However, sometimes the lesion simply migrates to another spot after surgery, so it is not a guaranteed cure.     If you cannot treat them yourself at home, call the home foot care nurses below:  [facility]  [phone] [facility]   [phone] [facility]   [phone]             [facility] LOCATIONS  [facility]  [address]  [place] [address]  Phone: [phone]  Fax: [phone] [facility]  [address]  [place] [address]  Phone: [phone]   Fax: [phone]   [facility]  [address]  [place] [address]  Phone: [phone]  Fax: [phone] [facility]  [address]  [place] [address]  Phone: [phone]   Fax: [phone]   [facility]   [address]  [place] [address]  Phone: [phone]   Fax: [phone] * Please call any location listed to make an appointment for a casting/fitting. Your referral was sent to their central office and they will all have the order on file.           Body Mass Index (BMI)  Many things can cause foot and ankle problems. Foot structure, activity level, foot mechanics and injuries are common causes of pain.  One very important issue that often goes unmentioned, is body weight.  Extra weight can cause increased stress  on muscles, ligaments, bones and tendons.  Sometimes just a few extra pounds is all it takes to put one over her/his threshold. Without reducing that stress, it can be difficult to alleviate pain. Some people are uncomfortable addressing this issue, but we feel it is important for you to think about it. As Foot &  Ankle specialists, our job is addressing the lower extremity problem and possible causes. Regarding extra body weight, we encourage patients to discuss diet and weight management plans with their primary care doctors. It is this team approach that gives you the best opportunity for pain relief and getting you back on your feet.

## 2017-10-13 ENCOUNTER — PRE VISIT (OUTPATIENT)
Dept: PULMONOLOGY | Facility: CLINIC | Age: 37
End: 2017-10-13

## 2017-10-13 ENCOUNTER — OFFICE VISIT (OUTPATIENT)
Dept: PULMONOLOGY | Facility: CLINIC | Age: 37
End: 2017-10-13
Attending: INTERNAL MEDICINE
Payer: COMMERCIAL

## 2017-10-13 VITALS
WEIGHT: 214 LBS | OXYGEN SATURATION: 97 % | HEART RATE: 73 BPM | RESPIRATION RATE: 16 BRPM | DIASTOLIC BLOOD PRESSURE: 104 MMHG | SYSTOLIC BLOOD PRESSURE: 173 MMHG | HEIGHT: 68 IN | BODY MASS INDEX: 32.43 KG/M2 | TEMPERATURE: 98.6 F

## 2017-10-13 DIAGNOSIS — R91.8 PULMONARY NODULES: Primary | ICD-10-CM

## 2017-10-13 PROCEDURE — 99213 OFFICE O/P EST LOW 20 MIN: CPT | Mod: ZF

## 2017-10-13 ASSESSMENT — PAIN SCALES - GENERAL: PAINLEVEL: MILD PAIN (2)

## 2017-10-13 NOTE — MR AVS SNAPSHOT
After Visit Summary   10/13/2017    Eliana Anders    MRN: [id number]           Patient Information     Date Of Birth          1980        Visit Information        Provider Department      10/13/2017 2:30 PM Susan Herzog MD [facility]        Today's Diagnoses     Pulmonary nodules    -  1       Follow-ups after your visit        Your next 10 appointments already scheduled     Oct 17, 2017  7:40 AM CDT   CAROLIN Extremity with Maggie Marrufo PT   [facility] ([facility])    [address] [place] [address]   [phone]            Oct 18, 2017  1:30 PM CDT   (Arrive by 1:15 PM)   New Patient Visit with CARISA Fried   [facility] ([facility])    [address] [place] [id number] [phone]            Oct 25, 2017 12:10 PM CDT   CAROLIN Extremity with Maggie Marrufo PT   [facility] ([facility])    [address] [place] [id number] [phone]            Nov 01, 2017 12:10 PM CDT   CAROLIN Extremity with Maggie Marrufo PT   [facility] ([facility])    [address] [place] [address] [phone]            Nov 02, 2017  2:30 PM CDT   New Visit with Stephane Doll DPM   [facility] ([facility]    [address] [place] [id number] [phone]            Nov 08, 2017 12:10 PM CST   CAROLIN Extremity with Maggie Marrufo PT   [facility] ([facility])    [address] [place] [address]  [address] [address] [phone]            Nov 15, 2017 12:10 PM CST   CAROLIN Extremity with Maggie Marrufo PT   [facility] ( [facility])    [facility] [address]  [address]  [facility] [address]   [phone]            Nov 22, 2017 12:10 PM CST   CAROLIN Extremity with Maggie Marrufo, PT   [facility] ( [facility])    [address] [place] [address]  [address]  [facility] [address]   [phone]            Nov 29, 2017 12:10 PM CST   CAROLIN Extremity with Maggie Marrufo, PT   [facility] ( [facility])    [facility] [address]  [address] [address]   [phone]            Dec 19, 2017  8:40 AM CST   (Arrive by 8:25 AM)   New General Liver with Jorge Nye MD   [facility] ([facility])    [address] [address] [phone]              Future tests that were ordered for you today     Open Future Orders        Priority Expected Expires Ordered    CT Lung biopsy mediastinum Routine 10/13/2017 10/13/2018 10/13/2017            Who to contact     If you have questions or need follow up information about today's clinic visit or your schedule please contact [facility] directly at [phone].  Normal or non-critical lab and imaging results will be communicated to you by [employer], letter or phone within 4 business days after the clinic has received the results. If you do not hear from us within 7 days, please contact the clinic through [employer] or phone. If you have a critical or abnormal lab result, we will notify you by phone as soon as possible.  Submit refill requests through [employer] or call your pharmacy and they will forward the refill request to us. Please allow 3 business days for your refill to be completed.          Additional Information About Your Visit        [employer] Information     [employer] gives you secure access to your electronic health record. If you see a primary care provider, you can also send  "messages to your care team and make appointments. If you have questions, please call your primary care clinic.  If you do not have a primary care provider, please call [phone] and they will assist you.        Care EveryWhere ID     This is your Care EveryWhere ID. This could be used by other organizations to access your [facility] medical records  [id number]        Your Vitals Were     Pulse Temperature Respirations Height Pulse Oximetry BMI (Body Mass Index)    73 98.6  F (37  C) (Oral) 16 1.727 m (5' 7.99\") 97% 32.55 kg/m2       Blood Pressure from Last 3 Encounters:   10/13/17 (!) 173/104   09/28/17 (!) 142/96   09/13/17 (!) 150/102    Weight from Last 3 Encounters:   10/13/17 97.1 kg (214 lb)   10/12/17 95.3 kg (210 lb)   09/28/17 95.6 kg (210 lb 11.2 oz)               Primary Care Provider Office Phone # Fax #    [facility] [phone]       [address] [place] [address]        Equal Access to Services     KEL BURNETT AH: Hadii aad ku hadasho Soomaali, waaxda luqadaha, qaybta kaalmada adeegyada, andre salinas hayfannyn mj arriola ah. So [facility] [phone].    ATENCIÓN: Si habla español, tiene a murillo disposición servicios gratuitos de asistencia lingüística. Llame al [phone].    We comply with applicable federal civil rights laws and [place] laws. We do not discriminate on the basis of race, color, national origin, age, disability, sex, sexual orientation, or gender identity.            Thank you!     Thank you for choosing [facility]  for your care. Our goal is always to provide you with excellent care. Hearing back from our patients is one way we can continue to improve our services. Please take a few minutes to complete the written survey that you may receive in the mail after your visit with us. Thank you!             Your Updated Medication List - Protect others around you: Learn how to safely use, store and throw away your " medicines at www.disposemymeds.org.          This list is accurate as of: 10/13/17  4:21 PM.  Always use your most recent med list.                   Brand Name Dispense Instructions for use Diagnosis    ALEVE PO      Take by mouth daily as needed for moderate pain        ammonium lactate 12 % cream    LAC-HYDRIN    385 g    Apply topically 2 times daily as needed for dry skin    Callus of foot, Dry skin       diclofenac 1 % Gel topical gel    VOLTAREN    100 g    Apply 4 grams to knees or 2 grams to hands four times daily using enclosed dosing card.    Chest wall contusion, unspecified laterality, subsequent encounter       ibuprofen 600 MG tablet    ADVIL/MOTRIN    20 tablet    Take 1 tablet (600 mg) by mouth every 6 hours as needed for pain        MIRENA (52 MG) 20 MCG/24HR IUD   Generic drug:  levonorgestrel     1 Intra Uterine Device    intrauterine uterine device placed    Surveillance of previously prescribed intrauterine contraceptive device, Insertion of IUD       * traMADol 50 MG tablet    ULTRAM    15 tablet    Take 1 tablet (50 mg) by mouth every 6 hours as needed for moderate pain    Chronic pain of right knee       * traMADol 50 MG tablet    ULTRAM    20 tablet    Take 1 tablet (50 mg) by mouth every 6 hours as needed for moderate pain    Chest wall pain       * traMADol 50 MG tablet    ULTRAM    20 tablet    Take 1 tablet (50 mg) by mouth every 6 hours as needed for moderate pain    Chest wall contusion, unspecified laterality, initial encounter       * traMADol 50 MG tablet    ULTRAM    18 tablet    Take 1 tablet (50 mg) by mouth every 6 hours as needed for pain maximum 6 tablet(s) per day    Chest wall pain       TYLENOL PO      Take 1,000 mg by mouth every 6 hours as needed for mild pain or fever        * Notice:  This list has 4 medication(s) that are the same as other medications prescribed for you. Read the directions carefully, and ask your doctor or other care provider to review them with you.

## 2017-10-13 NOTE — TELEPHONE ENCOUNTER
Received records via fax. I emailed a copy to Yoli/Mine/Mayte, I faxed a copy to Dr Herzog at the [facility] ([phone]), and also brought a copy to scanning.
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
## 2017-10-13 NOTE — TELEPHONE ENCOUNTER
Imaging received from [facility] via push, and is now attached & viewable.
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
## 2017-10-13 NOTE — NURSING NOTE
"Oncology Rooming Note    October 13, 2017 2:33 PM   Eliana Anders is a 37 year old female who presents for:    Chief Complaint   Patient presents with     Oncology Clinic Visit     New patient visit related to Lung Nodule     Initial Vitals: BP (!) 173/104  Pulse 73  Temp 98.6  F (37  C) (Oral)  Resp 16  Ht 1.727 m (5' 7.99\")  Wt 97.1 kg (214 lb)  SpO2 97%  BMI 32.55 kg/m2 Estimated body mass index is 32.55 kg/(m^2) as calculated from the following:    Height as of this encounter: 1.727 m (5' 7.99\").    Weight as of this encounter: 97.1 kg (214 lb). Body surface area is 2.16 meters squared.  Mild Pain (2) Comment: Data Unavailable   No LMP recorded. Patient is not currently having periods (Reason: IUD).  Allergies reviewed: Yes  Medications reviewed: Yes    Medications: Medication refills not needed today.  Pharmacy name entered into [place] [facility]: [id number] - [address]    Clinical concerns: No new concerns. Provider was notified.    10 minutes for nursing intake (face to face time)     Amanda Mckay LPN            "

## 2017-10-13 NOTE — TELEPHONE ENCOUNTER
I [place] [facility] [place] not received & spoke with Ami - she is pushing imaging & faxing reports now. I will attach imaging & fax reports when I receive them.

## 2017-10-17 ENCOUNTER — THERAPY VISIT (OUTPATIENT)
Dept: PHYSICAL THERAPY | Facility: CLINIC | Age: 37
End: 2017-10-17

## 2017-10-17 DIAGNOSIS — M54.6 PAIN IN THORACIC SPINE: ICD-10-CM

## 2017-10-17 DIAGNOSIS — R07.89 COSTOCHONDRAL CHEST PAIN: ICD-10-CM

## 2017-10-17 NOTE — MR AVS SNAPSHOT
After Visit Summary   10/17/2017    Eliana Anders    MRN: [id number]           Patient Information     Date Of Birth          1980        Visit Information        Provider Department      10/17/2017 7:40 AM Maggie Marrufo PT [facility]        Today's Diagnoses     Costochondral chest pain        Pain in thoracic spine           Follow-ups after your visit        Your next 10 appointments already scheduled     Oct 18, 2017  1:30 PM CDT   (Arrive by 1:15 PM)   New [facility] with [facility] ([facility])    [address]  1st Floor  [facility] [address] [phone]            Oct 25, 2017 12:10 PM CDT   CAROLIN Extremity with Maggie Marrufo PT   [facility] ( [facility])    [address] [place] [id number] [phone]            Oct 27, 2017  9:30 AM CDT   Office Visit with CARISA Austin [facility] ([facility]    [address] [id number] [phone]           Bring a current list of meds and any records pertaining to this visit. For Physicals, please bring immunization records and any forms needing to be filled out. Please arrive 10 minutes early to complete paperwork.            Nov 01, 2017 12:10 PM CDT   CAROLIN Extremity with Maggie Marrufo PT   [facility] ( [facility])    [address] [place] [id number] [phone]            Nov 02, 2017  2:30 PM CDT   New Visit with Stephane Doll DPM   [facility]    [address] [facility] [address] [phone]            Nov 08, 2017 12:10 PM CST   CAROLIN Extremity with Maggie Marrufo PT   [facility] ( [facility])    [address] [place] [address]  [address] [address]   [phone]            Nov 15, 2017 12:10 PM CST   CAROLIN Extremity with Maggie Marrufo, PT   [facility] [place] ([facility])    [address] [place] [address]  [address] [address]   [phone]            Nov 22, 2017 12:10 PM CST   CAROLIN Extremity with Maggie Marrufo, PT   [facility] [place] ([facility])    [address] [place] [address]  [address] [address]   [phone]            Nov 29, 2017 12:10 PM CST   CAROLIN Extremity with Maggie aMrrufo, PT   [facility] [place] ( [facility])    [address] [place] [address]  [address] [address]   [phone]            Dec 19, 2017  8:40 AM CST   (Arrive by 8:25 AM)   New General Liver with Jorge Nye MD   [facility] ([facility] [place])    [address] [place] [id number] [phone]              Who to contact     If you have questions or need follow up information about today's clinic visit or your schedule please contact [facility] directly at [phone].  Normal or non-critical lab and imaging results will be communicated to you by GuideWallhart, letter or phone within 4 business days after the clinic has received the results. If you do not hear from us within 7 days, please contact the clinic through GuideWallhart or phone. If you have a critical or abnormal lab result, we will notify you by phone as soon as possible.  Submit refill requests through Salesvue or call your pharmacy and they will forward the refill request to us. Please allow 3 business days for your refill to be completed.          Additional Information About Your Visit        Salesvue Information     Salesvue gives you secure access  to your electronic health record. If you see a primary care provider, you can also send messages to your care team and make appointments. If you have questions, please call your primary care clinic.  If you do not have a primary care provider, please call [phone] and they will assist you.        Care EveryWhere ID     This is your Care EveryWhere ID. This could be used by other organizations to access your [place] medical records  [id number]         Blood Pressure from Last 3 Encounters:   10/13/17 (!) 173/104   09/28/17 (!) 142/96   09/13/17 (!) 150/102    Weight from Last 3 Encounters:   10/13/17 97.1 kg (214 lb)   10/12/17 95.3 kg (210 lb)   09/28/17 95.6 kg (210 lb 11.2 oz)              Today, you had the following     No orders found for display       Primary Care Provider Office Phone # Fax #    [facility] [place] [phone]       [address] [place] [address]        Equal Access to Services     [facility] : Hadii aad ku hadasho Soomaali, waaxda luqadaha, qaybta kaalmada adeegyada, waxay rita hayparvez arriola . So [facility] [phone].    ATENCIÓN: Si habla español, tiene a murillo disposición servicios gratuitos de asistencia lingüística. Llame al [phone].    We comply with applicable federal civil rights laws and [place] laws. We do not discriminate on the basis of race, color, national origin, age, disability, sex, sexual orientation, or gender identity.            Thank you!     Thank you for choosing [facility] [place]  for your care. Our goal is always to provide you with excellent care. Hearing back from our patients is one way we can continue to improve our services. Please take a few minutes to complete the written survey that you may receive in the mail after your visit with us. Thank you!             Your Updated Medication List - Protect others around you: Learn how to safely use, store and throw away your  medicines at www.disposemymeds.org.          This list is accurate as of: 10/17/17  2:13 PM.  Always use your most recent med list.                   Brand Name Dispense Instructions for use Diagnosis    ALEVE PO      Take by mouth daily as needed for moderate pain        ammonium lactate 12 % cream    LAC-HYDRIN    385 g    Apply topically 2 times daily as needed for dry skin    Callus of foot, Dry skin       diclofenac 1 % Gel topical gel    VOLTAREN    100 g    Apply 4 grams to knees or 2 grams to hands four times daily using enclosed dosing card.    Chest wall contusion, unspecified laterality, subsequent encounter       ibuprofen 600 MG tablet    ADVIL/MOTRIN    20 tablet    Take 1 tablet (600 mg) by mouth every 6 hours as needed for pain        MIRENA (52 MG) 20 MCG/24HR IUD   Generic drug:  levonorgestrel     1 Intra Uterine Device    intrauterine uterine device placed    Surveillance of previously prescribed intrauterine contraceptive device, Insertion of IUD       * traMADol 50 MG tablet    ULTRAM    15 tablet    Take 1 tablet (50 mg) by mouth every 6 hours as needed for moderate pain    Chronic pain of right knee       * traMADol 50 MG tablet    ULTRAM    20 tablet    Take 1 tablet (50 mg) by mouth every 6 hours as needed for moderate pain    Chest wall pain       * traMADol 50 MG tablet    ULTRAM    20 tablet    Take 1 tablet (50 mg) by mouth every 6 hours as needed for moderate pain    Chest wall contusion, unspecified laterality, initial encounter       * traMADol 50 MG tablet    ULTRAM    18 tablet    Take 1 tablet (50 mg) by mouth every 6 hours as needed for pain maximum 6 tablet(s) per day    Chest wall pain       TYLENOL PO      Take 1,000 mg by mouth every 6 hours as needed for mild pain or fever        * Notice:  This list has 4 medication(s) that are the same as other medications prescribed for you. Read the directions carefully, and ask your doctor or other care provider to review them with you.

## 2017-10-18 ENCOUNTER — OFFICE VISIT (OUTPATIENT)
Dept: INTERVENTIONAL RADIOLOGY/VASCULAR | Facility: CLINIC | Age: 37
End: 2017-10-18

## 2017-10-18 VITALS
BODY MASS INDEX: 31.94 KG/M2 | OXYGEN SATURATION: 97 % | WEIGHT: 210 LBS | HEART RATE: 68 BPM | DIASTOLIC BLOOD PRESSURE: 88 MMHG | SYSTOLIC BLOOD PRESSURE: 139 MMHG

## 2017-10-18 DIAGNOSIS — J98.4 PULMONARY LESION, RIGHT: Primary | ICD-10-CM

## 2017-10-18 PROBLEM — E66.811 CLASS 1 OBESITY WITH BODY MASS INDEX (BMI) OF 30.0 TO 30.9 IN ADULT: Status: ACTIVE | Noted: 2017-10-18

## 2017-10-18 ASSESSMENT — ENCOUNTER SYMPTOMS
STIFFNESS: 1
MUSCLE CRAMPS: 1
SNORES LOUDLY: 1
SPUTUM PRODUCTION: 1
TASTE DISTURBANCE: 0
TROUBLE SWALLOWING: 0
RESPIRATORY PAIN: 0
BACK PAIN: 1
INSOMNIA: 1
SINUS PAIN: 0
POSTURAL DYSPNEA: 0
HOARSE VOICE: 1
DEPRESSION: 1
SORE THROAT: 1
ARTHRALGIAS: 1
NECK PAIN: 0
MUSCLE WEAKNESS: 1
NECK MASS: 0
SMELL DISTURBANCE: 0
JOINT SWELLING: 1
COUGH: 1
WHEEZING: 1
SINUS CONGESTION: 1
PANIC: 0
MYALGIAS: 1
COUGH DISTURBING SLEEP: 1
SHORTNESS OF BREATH: 0
HEMOPTYSIS: 0
DECREASED CONCENTRATION: 1
NERVOUS/ANXIOUS: 1

## 2017-10-18 NOTE — MR AVS SNAPSHOT
After Visit Summary   10/18/2017    Eliana Anders    MRN: [id number]           Patient Information     Date Of Birth          1980        Visit Information        Provider Department      10/18/2017 1:30 PM Justa Alexander APRN [facility] Interventional Radiology        Today's Diagnoses     Pulmonary lesion, right    -  1       Follow-ups after your visit        Follow-up notes from your care team     Discussed this visit      Your next 10 appointments already scheduled     Oct 25, 2017 12:10 PM CDT   CAROLIN Extremity with Maggie Marrufo PT   [facility] ([facility])    [address] [place] [address]   [phone]            Oct 27, 2017  9:30 AM CDT   Office Visit with CARISA Austin CNP   [facility] ([facility]    [address] [id number] [phone]           Bring a current list of meds and any records pertaining to this visit. For Physicals, please bring immunization records and any forms needing to be filled out. Please arrive 10 minutes early to complete paperwork.            Nov 01, 2017 12:10 PM CDT   CAROLIN Extremity with Maggie Marrufo PT   [facility] ([facility])    [address] [place] [address] [phone]            Nov 02, 2017  2:30 PM CDT   New Visit with Stephane Doll DPM   [facility] ([facility])    [address] [facility] [id number] [phone]            Nov 08, 2017 12:10 PM CST   CAROLIN Extremity with Maggie Marrufo PT   [facility] ([facility])    [address] [place] [address] [phone]            Nov 15, 2017 12:10 PM CST   CAROLIN Extremity with Maggie Marrufo PT   [facility] ([facility])    [facility] [address]  [address] [place] [address] [address]   [phone]            Nov 22, 2017 12:10 PM CST   CAROLIN Extremity with Maggie Marrufo PT   [facility] ([facility])    [address] [place] [address]  [address] [place] [address] [address]   [phone]            Nov 29, 2017 12:10 PM CST   CAROLIN Extremity with Maggie Marrufo PT   [facility] [place] ([facility])    [address] [place] [address]   [phone]            Dec 19, 2017  7:45 AM CST   Lab with  LAB   [facility] ([facility])    [address] [place] [address]  [address] [facility] [id number] [phone]            Dec 19, 2017  8:40 AM CST   (Arrive by 8:25 AM)   New General Liver with Jorge Nye MD   [facility] ([facility])    [address] [place] [address]  [address] [facility] [id number] [phone]              Who to contact     Please call your clinic at [phone] to:    Ask questions about your health    Make or cancel appointments    Discuss your medicines    Learn about your test results    Speak to your doctor   If you have compliments or concerns about an experience at your clinic, or if you wish to file a complaint, please contact [facility] Patient Relations at [phone] or email us at [email or web address]         Additional Information About Your Visit        [employer] Information     [employer] gives you secure access to your electronic health record. If you see a primary care provider, you can also send messages to your care team and make appointments. If you have questions, please call your primary care clinic.  If you do not have a primary care provider, please call [phone] and they will assist you.      [employer] is an electronic  gateway that provides easy, online access to your medical records. With [employer], you can request a clinic appointment, read your test results, renew a prescription or communicate with your care team.     To access your existing account, please contact your [facility] or call [phone] for assistance.        Care EveryWhere ID     This is your Care EveryWhere ID. This could be used by other organizations to access your [place] medical records  [id number]        Your Vitals Were     Pulse Pulse Oximetry BMI (Body Mass Index)             68 97% 31.94 kg/m2          Blood Pressure from Last 3 Encounters:   10/18/17 139/88   10/13/17 (!) 173/104   09/28/17 (!) 142/96    Weight from Last 3 Encounters:   10/18/17 95.3 kg (210 lb)   10/13/17 97.1 kg (214 lb)   10/12/17 95.3 kg (210 lb)              Today, you had the following     No orders found for display       Primary Care Provider Office Phone # Fax #    [facility] [phone]       [address] [address]        Equal Access to Services     KEL BURNETT : Hadii aad ku hadasho Soomaali, waaxda luqadaha, qaybta kaalmada adeegyada, andre salinas hayparvez arriola . So [facility] [phone].    ATENCIÓN: Si habla español, tiene a murillo disposición servicios gratuitos de asistencia lingüística. Elliotame al [phone].    We comply with applicable federal civil rights laws and [place] laws. We do not discriminate on the basis of race, color, national origin, age, disability, sex, sexual orientation, or gender identity.            Thank you!     Thank you for choosing [facility] INTERVENTIONAL RADIOLOGY  for your care. Our goal is always to provide you with excellent care. Hearing back from our patients is one way we can continue to improve our services. Please take a few minutes to complete the written survey that you may receive in the mail after your visit with us. Thank you!              Your Updated Medication List - Protect others around you: Learn how to safely use, store and throw away your medicines at www.disposemymeds.org.          This list is accurate as of: 10/18/17  2:49 PM.  Always use your most recent med list.                   Brand Name Dispense Instructions for use Diagnosis    ALEVE PO      Take by mouth daily as needed for moderate pain        ammonium lactate 12 % cream    LAC-HYDRIN    385 g    Apply topically 2 times daily as needed for dry skin    Callus of foot, Dry skin       diclofenac 1 % Gel topical gel    VOLTAREN    100 g    Apply 4 grams to knees or 2 grams to hands four times daily using enclosed dosing card.    Chest wall contusion, unspecified laterality, subsequent encounter       ibuprofen 600 MG tablet    ADVIL/MOTRIN    20 tablet    Take 1 tablet (600 mg) by mouth every 6 hours as needed for pain        MIRENA (52 MG) 20 MCG/24HR IUD   Generic drug:  levonorgestrel     1 Intra Uterine Device    intrauterine uterine device placed    Surveillance of previously prescribed intrauterine contraceptive device, Insertion of IUD       * traMADol 50 MG tablet    ULTRAM    15 tablet    Take 1 tablet (50 mg) by mouth every 6 hours as needed for moderate pain    Chronic pain of right knee       * traMADol 50 MG tablet    ULTRAM    20 tablet    Take 1 tablet (50 mg) by mouth every 6 hours as needed for moderate pain    Chest wall pain       * traMADol 50 MG tablet    ULTRAM    20 tablet    Take 1 tablet (50 mg) by mouth every 6 hours as needed for moderate pain    Chest wall contusion, unspecified laterality, initial encounter       * traMADol 50 MG tablet    ULTRAM    18 tablet    Take 1 tablet (50 mg) by mouth every 6 hours as needed for pain maximum 6 tablet(s) per day    Chest wall pain       TYLENOL PO      Take 1,000 mg by mouth every 6 hours as needed for mild pain or fever        * Notice:  This list has 4 medication(s) that are the same as other medications  prescribed for you. Read the directions carefully, and ask your doctor or other care provider to review them with you.
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
## 2017-10-20 ENCOUNTER — TELEPHONE (OUTPATIENT)
Dept: ONCOLOGY | Facility: CLINIC | Age: 37
End: 2017-10-20
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
## 2017-10-27 ENCOUNTER — OFFICE VISIT (OUTPATIENT)
Dept: PULMONOLOGY | Facility: CLINIC | Age: 37
End: 2017-10-27

## 2017-10-27 ENCOUNTER — OFFICE VISIT (OUTPATIENT)
Dept: FAMILY MEDICINE | Facility: CLINIC | Age: 37
End: 2017-10-27
Payer: COMMERCIAL

## 2017-10-27 VITALS
BODY MASS INDEX: 33.46 KG/M2 | DIASTOLIC BLOOD PRESSURE: 97 MMHG | HEART RATE: 77 BPM | OXYGEN SATURATION: 98 % | SYSTOLIC BLOOD PRESSURE: 157 MMHG | TEMPERATURE: 97.5 F | WEIGHT: 220 LBS

## 2017-10-27 DIAGNOSIS — S22.20XA CLOSED FRACTURE OF STERNUM, UNSPECIFIED PORTION OF STERNUM, INITIAL ENCOUNTER: Primary | ICD-10-CM

## 2017-10-27 DIAGNOSIS — R91.8 PULMONARY NODULES: Primary | ICD-10-CM

## 2017-10-27 PROCEDURE — 99214 OFFICE O/P EST MOD 30 MIN: CPT | Performed by: NURSE PRACTITIONER

## 2017-10-27 RX ORDER — MELOXICAM 15 MG/1
15 TABLET ORAL DAILY
Qty: 90 TABLET | Refills: 1 | Status: SHIPPED | OUTPATIENT
Start: 2017-10-27 | End: 2017-12-19

## 2017-10-27 ASSESSMENT — ANXIETY QUESTIONNAIRES
3. WORRYING TOO MUCH ABOUT DIFFERENT THINGS: SEVERAL DAYS
5. BEING SO RESTLESS THAT IT IS HARD TO SIT STILL: NOT AT ALL
GAD7 TOTAL SCORE: 5
1. FEELING NERVOUS, ANXIOUS, OR ON EDGE: SEVERAL DAYS
6. BECOMING EASILY ANNOYED OR IRRITABLE: SEVERAL DAYS
7. FEELING AFRAID AS IF SOMETHING AWFUL MIGHT HAPPEN: NOT AT ALL
2. NOT BEING ABLE TO STOP OR CONTROL WORRYING: SEVERAL DAYS

## 2017-10-27 ASSESSMENT — PATIENT HEALTH QUESTIONNAIRE - PHQ9
5. POOR APPETITE OR OVEREATING: SEVERAL DAYS
SUM OF ALL RESPONSES TO PHQ QUESTIONS 1-9: 6

## 2017-10-27 NOTE — MR AVS SNAPSHOT
After Visit Summary   10/27/2017    Eliana Anders    MRN: [id number]           Patient Information     Date Of Birth          1980        Visit Information        Provider Department      10/27/2017 9:30 AM Martha Davis APRN CNP [facility]        Today's Diagnoses     Closed fracture of sternum, unspecified portion of sternum, initial encounter    -  1      Care Instructions    Follow-up with orthopedics primarily for timeline of healing and work restrictions  Start mobic for pain    Schedule follow-up for hypertension          Follow-ups after your visit        Additional Services     ORTHO  REFERRAL       [facility] is referring you to the Orthopedic  Services at [facility].       The  Representative will assist you in the coordination of your Orthopedic and Musculoskeletal Care as prescribed by your physician.    The  Representative will call you within 1 business day to help schedule your appointment, or you may contact the  Representative at:    All areas ~ [phone]     Type of Referral : Non Surgical       Timeframe requested: 3 - 5 days    Coverage of these services is subject to the terms and limitations of your health insurance plan.  Please call member services at your health plan with any benefit or coverage questions.      If X-rays, CT or MRI's have been performed, please contact the facility where they were done to arrange for , prior to your scheduled appointment.  Please bring this referral request to your appointment and present it to your specialist.                  Your next 10 appointments already scheduled     Nov 01, 2017 12:10 PM CDT   CAROLIN Extremity with Maggie Marrufo PT   [facility]    [address] [place] [address] [phone]            Nov  08, 2017 12:10 PM CST   CAROLIN Extremity with Maggie Marrufo, PT   [facility] ([facility]    [facility] [place]  [address] [address]   [phone]            Nov 15, 2017 12:10 PM CST   CAROLIN Extremity with Maggie Marrufo, PT   [facility] ([facility]    [address] [place] [address]  [address] [address]   [phone]            Nov 22, 2017 12:10 PM CST   CAROLIN Extremity with Maggie Marrufo, PT   [facility] ([facility]    [address] [place] [address]  [address] [address]   [phone]            Nov 29, 2017 12:10 PM CST   CAROLIN Extremity with Maggie Marrufo, PT   [facility] ([facility]    [address] [place] [address]  [address] [address]   [phone]            Dec 19, 2017  7:45 AM CST   Lab with  LAB   [facility] ([facility])    [address] [facility]  1st [facility] [id number] [phone]            Dec 19, 2017  8:40 AM CST   (Arrive by 8:25 AM)   New General Liver with Jorge Nye MD   [facility] Hepatology ([facility])    [address] [place] [address]  3rd [facility] [id number] [phone]              Who to contact     If you have questions or need follow up information about today's clinic visit or your schedule please contact [facility] directly at [phone].  Normal or non-critical lab and imaging results will be communicated to you by MyChart, letter or phone within 4 business days after the clinic has received the results. If you do not hear from us within 7 days, please contact the clinic through MyChart or phone. If you have a critical or abnormal lab result, we will notify you by phone as soon as possible.  Submit refill requests through Paxata or  call your pharmacy and they will forward the refill request to us. Please allow 3 business days for your refill to be completed.          Additional Information About Your Visit        Africasanahart Information     Pensqr gives you secure access to your electronic health record. If you see a primary care provider, you can also send messages to your care team and make appointments. If you have questions, please call your primary care clinic.  If you do not have a primary care provider, please call [phone] and they will assist you.        Care EveryWhere ID     This is your Care EveryWhere ID. This could be used by other organizations to access your [place] medical records  [id number]        Your Vitals Were     Pulse Temperature Pulse Oximetry BMI (Body Mass Index)          77 97.5  F (36.4  C) (Oral) 98% 33.46 kg/m2         Blood Pressure from Last 3 Encounters:   10/27/17 (!) 157/97   10/18/17 139/88   10/13/17 (!) 173/104    Weight from Last 3 Encounters:   10/27/17 220 lb (99.8 kg)   10/18/17 210 lb (95.3 kg)   10/13/17 214 lb (97.1 kg)              We Performed the Following     ORTHO  REFERRAL          Today's Medication Changes          These changes are accurate as of: 10/27/17 10:40 AM.  If you have any questions, ask your nurse or doctor.               Start taking these medicines.        Dose/Directions    meloxicam 15 MG tablet   Commonly known as:  MOBIC   Used for:  Closed fracture of sternum, unspecified portion of sternum, initial encounter   Started by:  Martha Davis, CARISA CNP        Dose:  15 mg   Take 1 tablet (15 mg) by mouth daily   Quantity:  90 tablet   Refills:  1            Where to get your medicines      These medications were sent to [facility] [address] [address]     Phone:  [phone]     meloxicam 15 MG tablet                Primary Care Provider Office Phone # Fax # [phone]  [facility] [phone]       [address] [address]        Equal Access to Services     KEL BURNETT : Hadii aad ku hadsoto Macario, baljeetda miladisqvirginia, gisellta kawillieda dav, andre rees laGeovannaparvez senior. So [facility] [phone].    ATENCIÓN: Si habla español, tiene a murillo disposición servicios gratuitos de asistencia lingüística. Elliotame al [phone].    We comply with applicable federal civil rights laws and [place] laws. We do not discriminate on the basis of race, color, national origin, age, disability, sex, sexual orientation, or gender identity.            Thank you!     Thank you for choosing [facility]  for your care. Our goal is always to provide you with excellent care. Hearing back from our patients is one way we can continue to improve our services. Please take a few minutes to complete the written survey that you may receive in the mail after your visit with us. Thank you!             Your Updated Medication List - Protect others around you: Learn how to safely use, store and throw away your medicines at www.disposemymeds.org.          This list is accurate as of: 10/27/17 10:40 AM.  Always use your most recent med list.                   Brand Name Dispense Instructions for use Diagnosis    ALEVE PO      Take by mouth daily as needed for moderate pain        ammonium lactate 12 % cream    LAC-HYDRIN    385 g    Apply topically 2 times daily as needed for dry skin    Callus of foot, Dry skin       diclofenac 1 % Gel topical gel    VOLTAREN    100 g    Apply 4 grams to knees or 2 grams to hands four times daily using enclosed dosing card.    Chest wall contusion, unspecified laterality, subsequent encounter       ibuprofen 600 MG tablet    ADVIL/MOTRIN    20 tablet    Take 1 tablet (600 mg) by mouth every 6 hours as needed for pain        meloxicam 15 MG tablet    MOBIC    90 tablet    Take 1 tablet (15 mg) by mouth daily     Closed fracture of sternum, unspecified portion of sternum, initial encounter       MIRENA (52 MG) 20 MCG/24HR IUD   Generic drug:  levonorgestrel     1 Intra Uterine Device    intrauterine uterine device placed    Surveillance of previously prescribed intrauterine contraceptive device, Insertion of IUD       * traMADol 50 MG tablet    ULTRAM    15 tablet    Take 1 tablet (50 mg) by mouth every 6 hours as needed for moderate pain    Chronic pain of right knee       * traMADol 50 MG tablet    ULTRAM    20 tablet    Take 1 tablet (50 mg) by mouth every 6 hours as needed for moderate pain    Chest wall pain       * traMADol 50 MG tablet    ULTRAM    20 tablet    Take 1 tablet (50 mg) by mouth every 6 hours as needed for moderate pain    Chest wall contusion, unspecified laterality, initial encounter       * traMADol 50 MG tablet    ULTRAM    18 tablet    Take 1 tablet (50 mg) by mouth every 6 hours as needed for pain maximum 6 tablet(s) per day    Chest wall pain       TYLENOL PO      Take 1,000 mg by mouth every 6 hours as needed for mild pain or fever        * Notice:  This list has 4 medication(s) that are the same as other medications prescribed for you. Read the directions carefully, and ask your doctor or other care provider to review them with you.
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
## 2017-10-27 NOTE — MR AVS SNAPSHOT
After Visit Summary   10/27/2017    Eliana Anders    MRN: [id number]           Patient Information     Date Of Birth          1980        Visit Information        Provider Department      10/27/2017 1:45 PM Susan Herzog MD [facility]        Today's Diagnoses     Pulmonary nodules    -  1       Follow-ups after your visit        Your next 10 appointments already scheduled     Nov 01, 2017 12:10 PM CDT   CAROLIN Extremity with Maggie Marrufo, PT   [facility] ([facility])    [address] [place] [address]  [address] [address]   [phone]            Nov 08, 2017 12:10 PM CST   CAROLIN Extremity with Maggie Marrufo, PT   [facility] ([facility])    [address] [place] [address]   [phone]            Nov 15, 2017 12:10 PM CST   CAROLIN Extremity with Maggie Marrufo, PT   [facility] ([facility])    [address] [place] [address]   [phone]            Nov 22, 2017 12:10 PM CST   CAROLIN Extremity with Maggie Marrufo, PT   [facility] ([facility]    [address] [place] [address]  [address] [address]   [phone]            Nov 29, 2017 12:10 PM CST   CAROLIN Extremity with Maggie Marrufo, PT   [facility] ([facility])    [address] [place] [address]  [address] [address]   [phone]            Dec 19, 2017  7:45 AM CST   Lab with UC LAB    Health Lab ([facility])    [address]  [facility] [address] [phone]            Dec 19, 2017  8:40 AM CST   (Arrive by 8:25 AM)   New General Liver with Jorge Nye MD   [facility] ([facility])    [address] [facility]  [address] [facility] [address] [phone]              Future tests that were ordered for you today     Open Future Orders        Priority Expected Expires Ordered    CT Chest w/o Contrast Routine 2/24/2018 10/27/2018 10/27/2017            Who to contact     If you have questions or need follow up information about today's clinic visit or your schedule please contact [facility] directly at [phone].  Normal or non-critical lab and imaging results will be communicated to you by Ledburyhart, letter or phone within 4 business days after the clinic has received the results. If you do not hear from us within 7 days, please contact the clinic through OneRooft or phone. If you have a critical or abnormal lab result, we will notify you by phone as soon as possible.  Submit refill requests through Spinnaker Coating or call your pharmacy and they will forward the refill request to us. Please allow 3 business days for your refill to be completed.          Additional Information About Your Visit        Ledburyhart Information     Spinnaker Coating gives you secure access to your electronic health record. If you see a primary care provider, you can also send messages to your care team and make appointments. If you have questions, please call your primary care clinic.  If you do not have a primary care provider, please call [phone] and they will assist you.        Care EveryWhere ID     This is your Care EveryWhere ID. This could be used by other organizations to access your [place] medical records  [id number]         Blood Pressure from Last 3 Encounters:   10/27/17 (!) 157/97   10/18/17 139/88   10/13/17 (!) 173/104    Weight from Last 3 Encounters:   10/27/17 99.8 kg (220 lb)   10/18/17 95.3 kg (210 lb)   10/13/17 97.1 kg (214 lb)                 Today's Medication Changes          These changes are accurate as of: 10/27/17  1:51 PM.  If you have any questions, ask your nurse or  doctor.               Start taking these medicines.        Dose/Directions    meloxicam 15 MG tablet   Commonly known as:  MOBIC   Used for:  Closed fracture of sternum, unspecified portion of sternum, initial encounter   Started by:  Martha Davis APRN CNP        Dose:  15 mg   Take 1 tablet (15 mg) by mouth daily   Quantity:  90 tablet   Refills:  1            Where to get your medicines      These medications were sent to [facility] [address] [place], [place] [address] [facility] AT [address] [address]     Phone:  [phone]     meloxicam 15 MG tablet                Primary Care Provider Office Phone # Fax #    [facility] [phone]       [address] [address]        Equal Access to Services     KEL UMMC GrenadaGABBY : Hadii radha prabhakaro Socarlo, waaxda luqadaha, qaybta kaalmada adeegyada, andre arriola . So [facility] [phone].    ATENCIÓN: Si habla español, tiene a murillo disposición servicios gratuitos de asistencia lingüística. Jason al [phone].    We comply with applicable federal civil rights laws and [place] laws. We do not discriminate on the basis of race, color, national origin, age, disability, sex, sexual orientation, or gender identity.            Thank you!     Thank you for choosing [facility]  for your care. Our goal is always to provide you with excellent care. Hearing back from our patients is one way we can continue to improve our services. Please take a few minutes to complete the written survey that you may receive in the mail after your visit with us. Thank you!             Your Updated Medication List - Protect others around you: Learn how to safely use, store and throw away your medicines at www.disposemymeds.org.          This list is accurate as of: 10/27/17  1:51 PM.  Always use your most recent med list.                   Brand Name Dispense  Instructions for use Diagnosis    ALEVE PO      Take by mouth daily as needed for moderate pain        ammonium lactate 12 % cream    LAC-HYDRIN    385 g    Apply topically 2 times daily as needed for dry skin    Callus of foot, Dry skin       diclofenac 1 % Gel topical gel    VOLTAREN    100 g    Apply 4 grams to knees or 2 grams to hands four times daily using enclosed dosing card.    Chest wall contusion, unspecified laterality, subsequent encounter       ibuprofen 600 MG tablet    ADVIL/MOTRIN    20 tablet    Take 1 tablet (600 mg) by mouth every 6 hours as needed for pain        meloxicam 15 MG tablet    MOBIC    90 tablet    Take 1 tablet (15 mg) by mouth daily    Closed fracture of sternum, unspecified portion of sternum, initial encounter       MIRENA (52 MG) 20 MCG/24HR IUD   Generic drug:  levonorgestrel     1 Intra Uterine Device    intrauterine uterine device placed    Surveillance of previously prescribed intrauterine contraceptive device, Insertion of IUD       * traMADol 50 MG tablet    ULTRAM    15 tablet    Take 1 tablet (50 mg) by mouth every 6 hours as needed for moderate pain    Chronic pain of right knee       * traMADol 50 MG tablet    ULTRAM    20 tablet    Take 1 tablet (50 mg) by mouth every 6 hours as needed for moderate pain    Chest wall pain       * traMADol 50 MG tablet    ULTRAM    20 tablet    Take 1 tablet (50 mg) by mouth every 6 hours as needed for moderate pain    Chest wall contusion, unspecified laterality, initial encounter       * traMADol 50 MG tablet    ULTRAM    18 tablet    Take 1 tablet (50 mg) by mouth every 6 hours as needed for pain maximum 6 tablet(s) per day    Chest wall pain       TYLENOL PO      Take 1,000 mg by mouth every 6 hours as needed for mild pain or fever        * Notice:  This list has 4 medication(s) that are the same as other medications prescribed for you. Read the directions carefully, and ask your doctor or other care provider to review them with  you.

## 2017-10-27 NOTE — PROGRESS NOTES
Patient initially seen for incidental lung nodule. She was concerned despite reassurance that unlikely to be cancer. At her request, I arranged for IR consult. At the consult, recommendation made to continue monitoring with CT surveillance. I spoke with patient today to confirm the plan and she is in agreement.     RTC February 2018 with CT chest

## 2017-10-27 NOTE — PATIENT INSTRUCTIONS
Follow-up with orthopedics primarily for timeline of healing and work restrictions  Start mobic for pain    Schedule follow-up for hypertension

## 2017-10-27 NOTE — Clinical Note
Needs follow up scheduled in [place] with [facility] nodule clinic, CT prior to visit same day. Not in clinic. I am aware that my schedule isn't open for Feb yet, likely 2/2 will work.

## 2017-10-28 ASSESSMENT — ANXIETY QUESTIONNAIRES: GAD7 TOTAL SCORE: 5

## 2017-11-09 ENCOUNTER — OFFICE VISIT (OUTPATIENT)
Dept: SURGERY | Facility: CLINIC | Age: 37
End: 2017-11-09
Attending: STUDENT IN AN ORGANIZED HEALTH CARE EDUCATION/TRAINING PROGRAM
Payer: COMMERCIAL

## 2017-11-09 VITALS
OXYGEN SATURATION: 100 % | RESPIRATION RATE: 16 BRPM | TEMPERATURE: 98.3 F | HEART RATE: 77 BPM | BODY MASS INDEX: 32.86 KG/M2 | SYSTOLIC BLOOD PRESSURE: 161 MMHG | WEIGHT: 216.05 LBS | DIASTOLIC BLOOD PRESSURE: 96 MMHG

## 2017-11-09 DIAGNOSIS — S22.20XA CLOSED FRACTURE OF STERNUM, UNSPECIFIED PORTION OF STERNUM, INITIAL ENCOUNTER: Primary | ICD-10-CM

## 2017-11-09 PROCEDURE — 99212 OFFICE O/P EST SF 10 MIN: CPT | Mod: ZF

## 2017-11-09 ASSESSMENT — PAIN SCALES - GENERAL: PAINLEVEL: MILD PAIN (3)

## 2017-11-09 NOTE — NURSING NOTE
"Oncology Rooming Note    November 9, 2017 4:06 PM   [place] Cent[place] Bishnu Anders is a 37 year old female who presents for:    Chief Complaint   Patient presents with     Oncology Clinic Visit     New for Sternal Fracture     Initial Vitals: BP (!) 161/96  Pulse 77  Temp 98.3  F (36.8  C) (Oral)  Resp 16  Wt 98 kg (216 lb 0.8 oz)  SpO2 100%  BMI 32.86 kg/m2 Estimated body mass index is 32.86 kg/(m^2) as calculated from the following:    Height as of 10/13/17: 1.727 m (5' 7.99\").    Weight as of this encounter: 98 kg (216 lb 0.8 oz). Body surface area is 2.17 meters squared.  Mild Pain (3) Comment: Data Unavailable   No LMP recorded. Patient is not currently having periods (Reason: IUD).  Allergies reviewed: Yes  Medications reviewed: Yes    Medications: Medication refills not needed today.  Pharm[facility] entered into [facility] Panda[id number] [address]    Clinical concerns: pain12  keller was notified.    6  minutes for nursing intake (face to face time)     Leticia Jordan MA              "

## 2017-11-09 NOTE — Clinical Note
Hey, She needs to follow up with us in 2.5 months with a chest CT prior please. She is in room 21 waiting to schedule. Thanks H
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
## 2017-11-09 NOTE — MR AVS SNAPSHOT
After Visit Summary   11/9/2017    Eliana Anders    MRN: [id number]           Patient Information     Date Of Birth          1980        Visit Information        Provider Department      11/9/2017 4:00 PM Linda Lloyd MD [facility]        Today's Diagnoses     Closed fracture of sternum, unspecified portion of sternum, initial encounter    -  1       Follow-ups after your visit        Your next 10 appointments already scheduled     Nov 15, 2017 12:10 PM CST   CAROLIN Extremity with Maggie Marrufo PT   [facility] [place] ([facility])    [address] [place] [id number]   [phone]            Nov 22, 2017 12:10 PM CST   CAROLIN Extremity with Maggie Marrufo PT   [facility] [place] ([facility])    [address] [place] [address]   [phone]            Nov 29, 2017 12:10 PM CST   CAROLIN Extremity with Maggie Marrufo PT   [facility] [place] ([facility])    [address] [place] [address]   [phone]            Dec 19, 2017  7:45 AM CST   Lab with UC LAB   [facility] ([facility])    [address] [place] [id number] [phone]            Dec 19, 2017  8:40 AM CST   (Arrive by 8:25 AM)   New General Liver with Jorge Nye MD   [facility]at[facility] ([facility])    [address] [place] [id number] [phone]            Feb 01, 2018 11:20 AM CST   (Arrive by 11:05 AM)   CT CHEST W/O CONTRAST with UCCT2   [facility] [place] [facility] ([facility])    [address] [place] [id number] [phone]           Please bring any scans or X-rays taken at other hospitals, if similar tests were  done. Also bring a list of your medicines, including vitamins, minerals and over-the-counter drugs. It is safest to leave personal items at home.  Be sure to tell your doctor:   If you have any allergies.   If there s any chance you are pregnant.   If you are breastfeeding.   If you have any special needs.  You do not need to do anything special to prepare.  Please wear loose clothing, such as a sweat suit or jogging clothes. Avoid snaps, zippers and other metal. We may ask you to undress and put on a hospital gown.            Feb 01, 2018 12:00 PM CST   (Arrive by 11:45 AM)   Return Visit with Linda Lloyd MD   [facility] ([facility])    [address] [address] [phone]              Future tests that were ordered for you today     Open Future Orders        Priority Expected Expires Ordered    CT Chest w/o contrast Routine  11/9/2018 11/9/2017            Who to contact     If you have questions or need follow up information about today's clinic visit or your schedule please contact [facility] directly at [phone].  Normal or non-critical lab and imaging results will be communicated to you by MyChart, letter or phone within 4 business days after the clinic has received the results. If you do not hear from us within 7 days, please contact the clinic through [employer] or phone. If you have a critical or abnormal lab result, we will notify you by phone as soon as possible.  Submit refill requests through [employer] or call your pharmacy and they will forward the refill request to us. Please allow 3 business days for your refill to be completed.          Additional Information About Your Visit        [employer] Information     [employer] gives you secure access to your electronic health record. If you see a primary care provider, you can also send messages to your care team and make appointments. If you have questions,  please call your primary care clinic.  If you do not have a primary care provider, please call [phone] and they will assist you.        Care EveryWhere ID     This is your Care EveryWhere ID. This could be used by other organizations to access your [place] medical records  [id number]        Your Vitals Were     Pulse Temperature Respirations Pulse Oximetry BMI (Body Mass Index)       77 98.3  F (36.8  C) (Oral) 16 100% 32.86 kg/m2        Blood Pressure from Last 3 Encounters:   11/09/17 (!) 161/96   10/27/17 (!) 157/97   10/18/17 139/88    Weight from Last 3 Encounters:   11/09/17 98 kg (216 lb 0.8 oz)   10/27/17 99.8 kg (220 lb)   10/18/17 95.3 kg (210 lb)               Primary Care Provider Office Phone # Fax #    [facility] [phone]       [address] [address]        Equal Access to Services     KEL BURNETT : Hadii aad ku hadasho Soomaali, waaxda luqadaha, qaybta kaalmada adeegyada, waxay idiin hayfannyn mj arriola . So [facility] [phone].    ATENCIÓN: Si habla español, tiene a murillo disposición servicios gratuitos de asistencia lingüística. Llame al [phone].    We comply with applicable federal civil rights laws and [place] laws. We do not discriminate on the basis of race, color, national origin, age, disability, sex, sexual orientation, or gender identity.            Thank you!     Thank you for choosing [facility]  for your care. Our goal is always to provide you with excellent care. Hearing back from our patients is one way we can continue to improve our services. Please take a few minutes to complete the written survey that you may receive in the mail after your visit with us. Thank you!             Your Updated Medication List - Protect others around you: Learn how to safely use, store and throw away your medicines at www.disposemymeds.org.          This list is accurate as of: 11/9/17  4:59 PM.  Always use  your most recent med list.                   Brand Name Dispense Instructions for use Diagnosis    ALEVE PO      Take by mouth daily as needed for moderate pain        ammonium lactate 12 % cream    LAC-HYDRIN    385 g    Apply topically 2 times daily as needed for dry skin    Callus of foot, Dry skin       diclofenac 1 % Gel topical gel    VOLTAREN    100 g    Apply 4 grams to knees or 2 grams to hands four times daily using enclosed dosing card.    Chest wall contusion, unspecified laterality, subsequent encounter       ibuprofen 600 MG tablet    ADVIL/MOTRIN    20 tablet    Take 1 tablet (600 mg) by mouth every 6 hours as needed for pain        meloxicam 15 MG tablet    MOBIC    90 tablet    Take 1 tablet (15 mg) by mouth daily    Closed fracture of sternum, unspecified portion of sternum, initial encounter       MIRENA (52 MG) 20 MCG/24HR IUD   Generic drug:  levonorgestrel     1 Intra Uterine Device    intrauterine uterine device placed    Surveillance of previously prescribed intrauterine contraceptive device, Insertion of IUD       * traMADol 50 MG tablet    ULTRAM    15 tablet    Take 1 tablet (50 mg) by mouth every 6 hours as needed for moderate pain    Chronic pain of right knee       * traMADol 50 MG tablet    ULTRAM    20 tablet    Take 1 tablet (50 mg) by mouth every 6 hours as needed for moderate pain    Chest wall pain       * traMADol 50 MG tablet    ULTRAM    20 tablet    Take 1 tablet (50 mg) by mouth every 6 hours as needed for moderate pain    Chest wall contusion, unspecified laterality, initial encounter       * traMADol 50 MG tablet    ULTRAM    18 tablet    Take 1 tablet (50 mg) by mouth every 6 hours as needed for pain maximum 6 tablet(s) per day    Chest wall pain       TYLENOL PO      Take 1,000 mg by mouth every 6 hours as needed for mild pain or fever        * Notice:  This list has 4 medication(s) that are the same as other medications prescribed for you. Read the directions carefully,  and ask your doctor or other care provider to review them with you.

## 2017-12-14 ENCOUNTER — PRE VISIT (OUTPATIENT)
Dept: GASTROENTEROLOGY | Facility: CLINIC | Age: 37
End: 2017-12-14

## 2017-12-15 NOTE — PROGRESS NOTES
Was the patient contacted by phone and reminded of the upcoming visit? Yes    Was the patient instructed to bring a current list of all medications to the appointment or instructed to bring in all medication bottles? Yes, patient verbalized understanding    Patient instructed to arrive early for check-in    Lalitha Ortiz CMA  12/14/2017 7:28 PM

## 2017-12-19 ENCOUNTER — OFFICE VISIT (OUTPATIENT)
Dept: GASTROENTEROLOGY | Facility: CLINIC | Age: 37
End: 2017-12-19
Attending: INTERNAL MEDICINE
Payer: COMMERCIAL

## 2017-12-19 VITALS
BODY MASS INDEX: 32.01 KG/M2 | HEIGHT: 68 IN | TEMPERATURE: 97.9 F | HEART RATE: 66 BPM | SYSTOLIC BLOOD PRESSURE: 160 MMHG | DIASTOLIC BLOOD PRESSURE: 87 MMHG | OXYGEN SATURATION: 97 % | WEIGHT: 211.2 LBS

## 2017-12-19 DIAGNOSIS — K76.9 LESION OF LIVER: ICD-10-CM

## 2017-12-19 DIAGNOSIS — K83.8 INTRAHEPATIC BILE DUCT DILATION: ICD-10-CM

## 2017-12-19 LAB
ALBUMIN SERPL-MCNC: 3.6 G/DL (ref 3.4–5)
ALP SERPL-CCNC: 88 U/L (ref 40–150)
ALT SERPL W P-5'-P-CCNC: 24 U/L (ref 0–50)
ANION GAP SERPL CALCULATED.3IONS-SCNC: 6 MMOL/L (ref 3–14)
AST SERPL W P-5'-P-CCNC: 21 U/L (ref 0–45)
BILIRUB DIRECT SERPL-MCNC: 0.2 MG/DL (ref 0–0.2)
BILIRUB SERPL-MCNC: 0.9 MG/DL (ref 0.2–1.3)
BUN SERPL-MCNC: 9 MG/DL (ref 7–30)
CALCIUM SERPL-MCNC: 8.3 MG/DL (ref 8.5–10.1)
CHLORIDE SERPL-SCNC: 107 MMOL/L (ref 94–109)
CO2 SERPL-SCNC: 26 MMOL/L (ref 20–32)
CREAT SERPL-MCNC: 0.72 MG/DL (ref 0.52–1.04)
ERYTHROCYTE [DISTWIDTH] IN BLOOD BY AUTOMATED COUNT: 13.2 % (ref 10–15)
GFR SERPL CREATININE-BSD FRML MDRD: >90 ML/MIN/1.7M2
GLUCOSE SERPL-MCNC: 93 MG/DL (ref 70–99)
HAV IGG SER QL IA: NONREACTIVE
HBV CORE AB SERPL QL IA: NONREACTIVE
HBV SURFACE AB SERPL IA-ACNC: 1.61 M[IU]/ML
HBV SURFACE AG SERPL QL IA: NONREACTIVE
HCT VFR BLD AUTO: 42.3 % (ref 35–47)
HCV AB SERPL QL IA: NONREACTIVE
HGB BLD-MCNC: 13.9 G/DL (ref 11.7–15.7)
INR PPP: 0.95 (ref 0.86–1.14)
MCH RBC QN AUTO: 31.8 PG (ref 26.5–33)
MCHC RBC AUTO-ENTMCNC: 32.9 G/DL (ref 31.5–36.5)
MCV RBC AUTO: 97 FL (ref 78–100)
PLATELET # BLD AUTO: 210 10E9/L (ref 150–450)
POTASSIUM SERPL-SCNC: 4.5 MMOL/L (ref 3.4–5.3)
PROT SERPL-MCNC: 7.4 G/DL (ref 6.8–8.8)
RBC # BLD AUTO: 4.37 10E12/L (ref 3.8–5.2)
SODIUM SERPL-SCNC: 139 MMOL/L (ref 133–144)
WBC # BLD AUTO: 4.5 10E9/L (ref 4–11)

## 2017-12-19 PROCEDURE — 80048 BASIC METABOLIC PNL TOTAL CA: CPT | Performed by: INTERNAL MEDICINE

## 2017-12-19 PROCEDURE — 86704 HEP B CORE ANTIBODY TOTAL: CPT | Performed by: INTERNAL MEDICINE

## 2017-12-19 PROCEDURE — 86708 HEPATITIS A ANTIBODY: CPT | Performed by: INTERNAL MEDICINE

## 2017-12-19 PROCEDURE — 36415 COLL VENOUS BLD VENIPUNCTURE: CPT | Performed by: INTERNAL MEDICINE

## 2017-12-19 PROCEDURE — 86803 HEPATITIS C AB TEST: CPT | Performed by: INTERNAL MEDICINE

## 2017-12-19 PROCEDURE — 99213 OFFICE O/P EST LOW 20 MIN: CPT | Mod: ZF

## 2017-12-19 PROCEDURE — 86706 HEP B SURFACE ANTIBODY: CPT | Performed by: INTERNAL MEDICINE

## 2017-12-19 PROCEDURE — 87340 HEPATITIS B SURFACE AG IA: CPT | Performed by: INTERNAL MEDICINE

## 2017-12-19 PROCEDURE — 85610 PROTHROMBIN TIME: CPT | Performed by: INTERNAL MEDICINE

## 2017-12-19 PROCEDURE — 85027 COMPLETE CBC AUTOMATED: CPT | Performed by: INTERNAL MEDICINE

## 2017-12-19 PROCEDURE — 80076 HEPATIC FUNCTION PANEL: CPT | Performed by: INTERNAL MEDICINE

## 2017-12-19 ASSESSMENT — PAIN SCALES - GENERAL: PAINLEVEL: NO PAIN (0)
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
## 2017-12-19 NOTE — NURSING NOTE
"Chief Complaint   Patient presents with     Consult       Initial /87 (BP Location: Right arm, Patient Position: Sitting, Cuff Size: Adult Regular)  Pulse 66  Temp 97.9  F (36.6  C) (Oral)  Ht 1.715 m (5' 7.5\")  Wt 95.8 kg (211 lb 3.2 oz)  SpO2 97%  BMI 32.59 kg/m2 Estimated body mass index is 32.59 kg/(m^2) as calculated from the following:    Height as of this encounter: 1.715 m (5' 7.5\").    Weight as of this encounter: 95.8 kg (211 lb 3.2 oz).  Medication Reconciliation: complete    "

## 2017-12-19 NOTE — MR AVS SNAPSHOT
After Visit Summary   12/19/2017    Eliana Anders    MRN: [id number]           Patient Information     Date Of Birth          1980        Visit Information        Provider Department      12/19/2017 8:40 AM Jorge Conrad MD [facility]        Today's Diagnoses     Lesion of liver        Intrahepatic bile duct dilation           Follow-ups after your visit        Follow-up notes from your care team     Return if symptoms worsen or fail to improve.      Your next 10 appointments already scheduled     Feb 01, 2018 11:20 AM CST   (Arrive by 11:05 AM)   CT CHEST W/O CONTRAST with UCCT2   [facility] ([facility])    [address] [place] [id number] [phone]           Please bring any scans or X-rays taken at other hospitals, if similar tests were done. Also bring a list of your medicines, including vitamins, minerals and over-the-counter drugs. It is safest to leave personal items at home.  Be sure to tell your doctor:   If you have any allergies.   If there s any chance you are pregnant.   If you are breastfeeding.   If you have any special needs.  You do not need to do anything special to prepare.  Please wear loose clothing, such as a sweat suit or jogging clothes. Avoid snaps, zippers and other metal. We may ask you to undress and put on a hospital gown.            Feb 01, 2018 12:00 PM CST   (Arrive by 11:45 AM)   Return Visit with Linda Lloyd MD   [facility] ([facility])    [address] [place] [address] [phone]              Future tests that were ordered for you today     Open Future Orders        Priority Expected Expires Ordered    CT Outside Read Routine  12/19/2018 12/19/2017            Who to contact     If you have questions or need follow up information about today's clinic visit or your schedule please  "contact [facility]PATOLOG[facility] directly at [phone].  Normal or non-critical lab and imaging results will be communicated to you by MyChart, letter or phone within 4 business days after the clinic has received the results. If you do not hear from us within 7 days, please contact the clinic through MyChart or phone. If you have a critical or abnormal lab result, we will notify you by phone as soon as possible.  Submit refill requests through Zyante or call your pharmacy and they will forward the refill request to us. Please allow 3 business days for your refill to be completed.          Additional Information About Your Visit        [employer] Information     Zyante gives you secure access to your electronic health record. If you see a primary care provider, you can also send messages to your care team and make appointments. If you have questions, please call your primary care clinic.  If you do not have a primary care provider, please call [phone] and they will assist you.        Care EveryWhere ID     This is your Care EveryWhere ID. This could be used by other organizations to access your [facility] medical records  [id number]        Your Vitals Were     Pulse Temperature Height Pulse Oximetry BMI (Body Mass Index)       66 97.9  F (36.6  C) (Oral) 1.715 m (5' 7.5\") 97% 32.59 kg/m2        Blood Pressure from Last 3 Encounters:   12/19/17 160/87   11/09/17 (!) 161/96   10/27/17 (!) 157/97    Weight from Last 3 Encounters:   12/19/17 95.8 kg (211 lb 3.2 oz)   11/09/17 98 kg (216 lb 0.8 oz)   10/27/17 99.8 kg (220 lb)               Primary Care Provider Office Phone # Fax #    [facility] [phone]       [address] [place] [address]        Equal Access to Services     KEL BURNETT AH: Edwin Macario, waripda luqadaha, qaybta kaalmalisette demarco, andre senior. [facility] [phone].    ATENCIÓN: Si juan mac " disposición servicios gratuitos de asistencia lingüística. Jason mortensen [phone].    We comply with applicable federal civil rights laws and [place] laws. We do not discriminate on the basis of race, color, national origin, age, disability, sex, sexual orientation, or gender identity.            Thank you!     Thank you for choosing [facility]PATOLOG[facility]  for your care. Our goal is always to provide you with excellent care. Hearing back from our patients is one way we can continue to improve our services. Please take a few minutes to complete the written survey that you may receive in the mail after your visit with us. Thank you!             Your Updated Medication List - Protect others around you: Learn how to safely use, store and throw away your medicines at www.disposemymeds.org.          This list is accurate as of: 12/19/17 11:59 PM.  Always use your most recent med list.                   Brand Name Dispense Instructions for use Diagnosis    MIRENA (52 MG) 20 MCG/24HR IUD   Generic drug:  levonorgestrel     1 Intra Uterine Device    intrauterine uterine device placed    Surveillance of previously prescribed intrauterine contraceptive device, Insertion of IUD

## 2017-12-20 ENCOUNTER — HOSPITAL ENCOUNTER (INPATIENT)
Dept: GENERAL RADIOLOGY | Facility: CLINIC | Age: 37
End: 2017-12-20
Attending: INTERNAL MEDICINE
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
## 2018-02-01 ENCOUNTER — RADIANT APPOINTMENT (OUTPATIENT)
Dept: CT IMAGING | Facility: CLINIC | Age: 38
End: 2018-02-01
Attending: CLINICAL NURSE SPECIALIST
Payer: COMMERCIAL

## 2018-02-01 ENCOUNTER — OFFICE VISIT (OUTPATIENT)
Dept: SURGERY | Facility: CLINIC | Age: 38
End: 2018-02-01
Attending: STUDENT IN AN ORGANIZED HEALTH CARE EDUCATION/TRAINING PROGRAM
Payer: COMMERCIAL

## 2018-02-01 VITALS
SYSTOLIC BLOOD PRESSURE: 153 MMHG | OXYGEN SATURATION: 96 % | HEIGHT: 68 IN | DIASTOLIC BLOOD PRESSURE: 87 MMHG | HEART RATE: 83 BPM | WEIGHT: 208.3 LBS | RESPIRATION RATE: 16 BRPM | BODY MASS INDEX: 31.57 KG/M2 | TEMPERATURE: 98.2 F

## 2018-02-01 DIAGNOSIS — S22.20XA CLOSED FRACTURE OF STERNUM, UNSPECIFIED PORTION OF STERNUM, INITIAL ENCOUNTER: ICD-10-CM

## 2018-02-01 DIAGNOSIS — S22.20XS CLOSED FRACTURE OF STERNUM, UNSPECIFIED PORTION OF STERNUM, SEQUELA: Primary | ICD-10-CM

## 2018-02-01 PROCEDURE — G0463 HOSPITAL OUTPT CLINIC VISIT: HCPCS | Mod: ZF

## 2018-02-01 RX ORDER — MELOXICAM 15 MG/1
TABLET ORAL
Refills: 0 | COMMUNITY
Start: 2018-01-24 | End: 2018-05-03

## 2018-02-01 ASSESSMENT — PAIN SCALES - GENERAL: PAINLEVEL: NO PAIN (0)

## 2018-02-01 NOTE — MR AVS SNAPSHOT
"              After Visit Summary   2/1/2018    Eliana Anders    MRN: [id number]           Patient Information     Date Of Birth          1980        Visit Information        Provider Department      2/1/2018 12:00 PM Linda Lloyd MD [facility]        Today's Diagnoses     Closed fracture of sternum, unspecified portion of sternum, sequela    -  1       Follow-ups after your visit        Who to contact     If you have questions or need follow up information about today's clinic visit or your schedule please contact [facility] directly at [phone].  Normal or non-critical lab and imaging results will be communicated to you by Goshihart, letter or phone within 4 business days after the clinic has received the results. If you do not hear from us within 7 days, please contact the clinic through Goshihart or phone. If you have a critical or abnormal lab result, we will notify you by phone as soon as possible.  Submit refill requests through Servoyant or call your pharmacy and they will forward the refill request to us. Please allow 3 business days for your refill to be completed.          Additional Information About Your Visit        MyChart Information     [employer] gives you secure access to your electronic health record. If you see a primary care provider, you can also send messages to your care team and make appointments. If you have questions, please call your primary care clinic.  If you do not have a primary care provider, please call [phone] and they will assist you.        Care EveryWhere ID     This is your Care EveryWhere ID. This could be used by other organizations to access your [facility] medical records  [id number]        Your Vitals Were     Pulse Temperature Respirations Height Pulse Oximetry BMI (Body Mass Index)    83 98.2  F (36.8  C) (Oral) 16 1.715 m (5' 7.5\") 96% 32.14 kg/m2       Blood Pressure from Last 3 Encounters:   02/01/18 " 153/87   12/19/17 160/87   11/09/17 (!) 161/96    Weight from Last 3 Encounters:   02/01/18 94.5 kg (208 lb 4.8 oz)   12/19/17 95.8 kg (211 lb 3.2 oz)   11/09/17 98 kg (216 lb 0.8 oz)              Today, you had the following     No orders found for display       Primary Care Provider Office Phone # Fax #    [facility] [phone]       [address] [address]        Equal Access to Services     [facility]: Hadii aad ku hadasho Soomaali, waaxda luqadaha, qaybta kaalmada adevirginia, adnre arriola . So [facility] [phone].    ATENCIÓN: Si habla español, tiene a murillo disposición servicios gratuitos de asistencia lingüística. Llame al [phone].    We comply with applicable federal civil rights laws and [place] laws. We do not discriminate on the basis of race, color, national origin, age, disability, sex, sexual orientation, or gender identity.            Thank you!     Thank you for choosing [facility]  for your care. Our goal is always to provide you with excellent care. Hearing back from our patients is one way we can continue to improve our services. Please take a few minutes to complete the written survey that you may receive in the mail after your visit with us. Thank you!             Your Updated Medication List - Protect others around you: Learn how to safely use, store and throw away your medicines at www.disposemymeds.org.          This list is accurate as of 2/1/18  5:43 PM.  Always use your most recent med list.                   Brand Name Dispense Instructions for use Diagnosis    meloxicam 15 MG tablet    MOBIC     TK 1 T PO D        MIRENA (52 MG) 20 MCG/24HR IUD   Generic drug:  levonorgestrel     1 Intra Uterine Device    intrauterine uterine device placed    Surveillance of previously prescribed intrauterine contraceptive device, Insertion of IUD
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
## 2018-02-01 NOTE — NURSING NOTE
"Oncology Rooming Note    February 1, 2018 11:54 AM   Eliana Centeno Bishnu Anders is a 37 year old female who presents for:    Chief Complaint   Patient presents with     Oncology Clinic Visit     return patient visit for follow up on sternal fracture      Initial Vitals: /87  Pulse 83  Temp 98.2  F (36.8  C) (Oral)  Resp 16  Ht 1.715 m (5' 7.5\")  Wt 94.5 kg (208 lb 4.8 oz)  SpO2 96%  BMI 32.14 kg/m2 Estimated body mass index is 32.14 kg/(m^2) as calculated from the following:    Height as of this encounter: 1.715 m (5' 7.5\").    Weight as of this encounter: 94.5 kg (208 lb 4.8 oz). Body surface area is 2.12 meters squared.  No Pain (0) Comment: Data Unavailable   No LMP recorded. Patient is not currently having periods (Reason: IUD).  Allergies reviewed: Yes  Medications reviewed: Yes    Medications: Medication refills not needed today.  Pharmacy name entered into CardioVIP: [id number] - [place], [place] - [address] [place] [address] [address]    Clinical concerns: no concerns - did not ask oncology distress screening questions (resident walked in to see patient after reconciling medications dr. keller was notified.    7 minutes for nursing intake (face to face time)     Michael Jeffery CMA              "

## 2018-02-02 DIAGNOSIS — R91.1 LUNG NODULE: Primary | ICD-10-CM
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
## 2018-04-23 DIAGNOSIS — S22.20XS CLOSED FRACTURE OF STERNUM, UNSPECIFIED PORTION OF STERNUM, SEQUELA: ICD-10-CM

## 2018-04-23 NOTE — TELEPHONE ENCOUNTER
"Requested Prescriptions   Pending Prescriptions Disp Refills     meloxicam (MOBIC) 15 MG tablet    Last Written Prescription Date:  1-24-18  Last Fill Quantity: 0,  # refills: 0   Last office visit: 10/27/2017 with prescribing provider:  10-27-17   Future Office Visit:     30 tablet 0    NSAID Medications Failed    4/23/2018  3:37 PM       Failed - Blood pressure under 140/90 in past 12 months    BP Readings from Last 3 Encounters:   02/01/18 153/87   12/19/17 160/87   11/09/17 (!) 161/96                Passed - Normal ALT on file in past 12 months    Recent Labs   Lab Test  12/19/17   0938   ALT  24            Passed - Normal AST on file in past 12 months    Recent Labs   Lab Test  12/19/17   0938   AST  21            Passed - Recent (12 mo) or future (30 days) visit within the authorizing provider's specialty    Patient had office visit in the last 12 months or has a visit in the next 30 days with authorizing provider or within the authorizing provider's specialty.  See \"Patient Info\" tab in inbasket, or \"Choose Columns\" in Meds & Orders section of the refill encounter.           Passed - Patient is age 6-64 years       Passed - Normal CBC on file in past 12 months    Recent Labs   Lab Test  12/19/17   0938   WBC  4.5   RBC  4.37   HGB  13.9   HCT  42.3   PLT  210            Passed - No active pregnancy on record       Passed - Normal serum creatinine on file in past 12 months    Recent Labs   Lab Test  12/19/17   0938   CR  0.72            Passed - No positive pregnancy test in past 12 months          "
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
## 2018-04-24 ENCOUNTER — OFFICE VISIT (OUTPATIENT)
Dept: OBGYN | Facility: CLINIC | Age: 38
End: 2018-04-24
Payer: COMMERCIAL

## 2018-04-24 ENCOUNTER — OFFICE VISIT (OUTPATIENT)
Dept: FAMILY MEDICINE | Facility: CLINIC | Age: 38
End: 2018-04-24
Payer: COMMERCIAL

## 2018-04-24 VITALS
WEIGHT: 210 LBS | TEMPERATURE: 98.4 F | BODY MASS INDEX: 32.41 KG/M2 | HEART RATE: 110 BPM | SYSTOLIC BLOOD PRESSURE: 152 MMHG | DIASTOLIC BLOOD PRESSURE: 100 MMHG | OXYGEN SATURATION: 97 %

## 2018-04-24 DIAGNOSIS — I10 BENIGN ESSENTIAL HYPERTENSION: ICD-10-CM

## 2018-04-24 DIAGNOSIS — H65.03 BILATERAL ACUTE SEROUS OTITIS MEDIA, RECURRENCE NOT SPECIFIED: ICD-10-CM

## 2018-04-24 DIAGNOSIS — N75.0 BARTHOLIN'S CYST: ICD-10-CM

## 2018-04-24 DIAGNOSIS — G44.52 NEW DAILY PERSISTENT HEADACHE: Primary | ICD-10-CM

## 2018-04-24 DIAGNOSIS — N75.1 BARTHOLIN'S GLAND ABSCESS: Primary | ICD-10-CM

## 2018-04-24 DIAGNOSIS — F43.0 ACUTE REACTION TO STRESS: ICD-10-CM

## 2018-04-24 PROCEDURE — 99215 OFFICE O/P EST HI 40 MIN: CPT | Performed by: PHYSICIAN ASSISTANT

## 2018-04-24 PROCEDURE — 56420 I&D BARTHOLINS GLAND ABSCESS: CPT | Performed by: OBSTETRICS & GYNECOLOGY

## 2018-04-24 PROCEDURE — 99203 OFFICE O/P NEW LOW 30 MIN: CPT | Mod: 25 | Performed by: OBSTETRICS & GYNECOLOGY

## 2018-04-24 RX ORDER — OXYCODONE AND ACETAMINOPHEN 5; 325 MG/1; MG/1
1 TABLET ORAL EVERY 6 HOURS PRN
Qty: 4 TABLET | Refills: 0 | Status: SHIPPED | OUTPATIENT
Start: 2018-04-24 | End: 2018-05-03

## 2018-04-24 RX ORDER — AMLODIPINE BESYLATE 5 MG/1
5 TABLET ORAL DAILY
Qty: 30 TABLET | Refills: 0 | Status: SHIPPED | OUTPATIENT
Start: 2018-04-24 | End: 2018-05-03

## 2018-04-24 NOTE — PATIENT INSTRUCTIONS
The area may continue to drain some straw colored, pink, white, yellow or greenish fluid and that is ok.  You want the gland to continue to drain.  It is ok to squeeze and try to express more if it is draining.  There will also be some bleeding from the area and that is normal.  If there is heavy bleeding (like a period), worsening pain, a foul smell, it re-grows or you develop a temperature (>100.4f), call the clinic.  [phone].    You can do tub soaks as you like, don't use epsom salts, just plain water.  Wash with your regular soap.  Don't put any antibiotic ointment, creams or powders on it.

## 2018-04-24 NOTE — TELEPHONE ENCOUNTER
Routing refill request to provider for review/approval because:  bp not at goal    Mary Parekh RN   [facility]

## 2018-04-24 NOTE — LETTER
[facility]  [address] [facility] [address] [facility] [address]  [place] [facility] [address]  Phone: [phone]  Fax: [phone]    April 24, 2018        Eliana Anders  [address]  [facility] [address]          To whom it may concern:    RE: Eliana Anders    Patient was seen and treated today at our clinic. She will be starting a new medication which may cause some adverse reactions. Please excuse her from work until 4/27/18 because of this.     Please contact me for questions or concerns.      Sincerely,        Anuradha Ku PA-C

## 2018-04-24 NOTE — MR AVS SNAPSHOT
After Visit Summary   4/24/2018    Eliana Anders    MRN: [id number]           Patient Information     Date Of Birth          1980        Visit Information        Provider Department      4/24/2018 2:30 PM Nicole Davey MD [facility]        Today's Diagnoses     Bartholin's gland abscess    -  1      Care Instructions    The area may continue to drain some straw colored, pink, white, yellow or greenish fluid and that is ok.  You want the gland to continue to drain.  It is ok to squeeze and try to express more if it is draining.  There will also be some bleeding from the area and that is normal.  If there is heavy bleeding (like a period), worsening pain, a foul smell, it re-grows or you develop a temperature (>100.4f), call the clinic.  [phone].    You can do tub soaks as you like, don't use epsom salts, just plain water.  Wash with your regular soap.  Don't put any antibiotic ointment, creams or powders on it.            Follow-ups after your visit        Your next 10 appointments already scheduled     May 03, 2018  2:45 PM CDT   PHYSICAL with CARISA Austin CNP   [facility]    [address] [place] [address] [phone]            Aug 10, 2018 12:00 PM CDT   (Arrive by 11:45 AM)   CT CHEST W/O & W CONTRAST with UCCT2   [facility] ([facility])    [address]  1st Floor  [facility] [address] [phone]           Please bring any scans or X-rays taken at other hospitals, if similar tests were done. Also bring a list of your medicines, including vitamins, minerals and over-the-counter drugs. It is safest to leave personal items at home.  Be sure to tell your doctor:   If you have any allergies.   If there s any chance you are pregnant.   If you are breastfeeding.    If you have diabetes as your medication may need to be  adjusted for this exam.  You will have contrast for this exam. To prepare:   Do not eat or drink for 2 hours before your exam. If you need to take medicine, you may take it with small sips of water. (We may ask you to take liquid medicine as well.)   The day before your exam, drink extra fluids at least six 8-ounce glasses (unless your doctor tells you to restrict your fluids).  Patients over 70 or patients with diabetes or kidney problems:   If you haven t had a blood test (creatinine test) within the last 30 days, the Cardiologist/Radiologist may require you to get this test prior to your exam.  Please wear loose clothing, such as a sweat suit or jogging clothes. Avoid snaps, zippers and other metal. We may ask you to undress and put on a hospital gown.  If you have any questions, please call the Imaging Department where you will have your exam.            Aug 10, 2018  1:00 PM CDT   (Arrive by 12:45 PM)   Return Visit with Susan Herzog MD   [facility] ([facility])    [address] [place] [address]  [address] [place] [address] [phone]              Who to contact     If you have questions or need follow up information about today's clinic visit or your schedule please contact [facility] directly at [phone].  Normal or non-critical lab and imaging results will be communicated to you by MyChart, letter or phone within 4 business days after the clinic has received the results. If you do not hear from us within 7 days, please contact the clinic through [employer] or phone. If you have a critical or abnormal lab result, we will notify you by phone as soon as possible.  Submit refill requests through [employer] or call your pharmacy and they will forward the refill request to us. Please allow 3 business days for your refill to be completed.          Additional Information About Your Visit        [employer] Information     [employer] gives you secure  access to your electronic health record. If you see a primary care provider, you can also send messages to your care team and make appointments. If you have questions, please call your primary care clinic.  If you do not have a primary care provider, please call [phone] and they will assist you.        Care EveryWhere ID     This is your Care EveryWhere ID. This could be used by other organizations to access your [facility] medical records  [id number]         Blood Pressure from Last 3 Encounters:   04/24/18 (!) 152/100   02/01/18 153/87   12/19/17 160/87    Weight from Last 3 Encounters:   04/24/18 210 lb (95.3 kg)   02/01/18 208 lb 4.8 oz (94.5 kg)   12/19/17 211 lb 3.2 oz (95.8 kg)              Today, you had the following     No orders found for display         Today's Medication Changes          These changes are accurate as of 4/24/18  3:22 PM.  If you have any questions, ask your nurse or doctor.               Start taking these medicines.        Dose/Directions    amLODIPine 5 MG tablet   Commonly known as:  NORVASC   Used for:  Benign essential hypertension   Started by:  Anuradha Ku PA-C        Dose:  5 mg   Take 1 tablet (5 mg) by mouth daily   Quantity:  30 tablet   Refills:  0       oxyCODONE-acetaminophen 5-325 MG per tablet   Commonly known as:  PERCOCET   Used for:  Bartholin's gland abscess   Started by:  Nicole Davey MD        Dose:  1 tablet   Take 1 tablet by mouth every 6 hours as needed for severe pain   Quantity:  4 tablet   Refills:  0            Where to get your medicines      These medications were sent to [facility] [address] AT [address] [address]     Phone:  [phone]     amLODIPine 5 MG tablet         Some of these will need a paper prescription and others can be bought over the counter.  Ask your nurse if you have questions.     Bring a paper prescription for each of these  medications     oxyCODONE-acetaminophen 5-325 MG per tablet               Information about OPIOIDS     PRESCRIPTION OPIOIDS: WHAT YOU NEED TO KNOW   You have a prescription for an opioid (narcotic) pain medicine. Opioids can cause addiction. If you have a history of chemical dependency of any type, you are at a higher risk of becoming addicted to opioids. Only take this medicine after all other options have been tried. Take it for as short a time and as few doses as possible.     Do not:    Drive. If you drive while taking these medicines, you could be arrested for driving under the influence (DUI).    Operate heavy machinery    Do any other dangerous activities while taking these medicines.     Drink any alcohol while taking these medicines.      Take with any other medicines that contain acetaminophen. Read all labels carefully. Look for the word  acetaminophen  or  Tylenol.  Ask your pharmacist if you have questions or are unsure.    Store your pills in a secure place, locked if possible. We will not replace any lost or stolen medicine. If you don t finish your medicine, please throw away (dispose) as directed by your pharmacist. The [place] Pollution Control Agency has more information about safe disposal: https://www.pca.[facility].mn.us/living-green/managing-unwanted-medications    All opioids tend to cause constipation. Drink plenty of water and eat foods that have a lot of fiber, such as fruits, vegetables, prune juice, apple juice and high-fiber cereal. Take a laxative (Miralax, milk of magnesia, Colace, Senna) if you don t move your bowels at least every other day.          Primary Care Provider Office Phone # Fax #    [facility] [phone]       [address] [place] [address]        Equal Access to Services     [facility] HORTENCIA AH: Edwin Macario, georgette dahl, qaybandre lombardo. So [facility]  [phone].    ATENCIÓN: Si pao diop, tiene a murillo disposición servicios gratuitos de asistencia lingüística. Jason mortensen [phone].    We comply with applicable federal civil rights laws and [place] laws. We do not discriminate on the basis of race, color, national origin, age, disability, sex, sexual orientation, or gender identity.            Thank you!     Thank you for choosing [facility]  for your care. Our goal is always to provide you with excellent care. Hearing back from our patients is one way we can continue to improve our services. Please take a few minutes to complete the written survey that you may receive in the mail after your visit with us. Thank you!             Your Updated Medication List - Protect others around you: Learn how to safely use, store and throw away your medicines at www.disposemymeds.org.          This list is accurate as of 4/24/18  3:22 PM.  Always use your most recent med list.                   Brand Name Dispense Instructions for use Diagnosis    amLODIPine 5 MG tablet    NORVASC    30 tablet    Take 1 tablet (5 mg) by mouth daily    Benign essential hypertension       meloxicam 15 MG tablet    MOBIC     TK 1 T PO D        MIRENA (52 MG) 20 MCG/24HR IUD   Generic drug:  levonorgestrel     1 Intra Uterine Device    intrauterine uterine device placed    Surveillance of previously prescribed intrauterine contraceptive device, Insertion of IUD       oxyCODONE-acetaminophen 5-325 MG per tablet    PERCOCET    4 tablet    Take 1 tablet by mouth every 6 hours as needed for severe pain    Bartholin's gland abscess

## 2018-04-24 NOTE — MR AVS SNAPSHOT
After Visit Summary   4/24/2018    Eliana Anders    MRN: [id number]           Patient Information     Date Of Birth          1980        Visit Information        Provider Department      4/24/2018 1:40 PM Anuradha Ku PA-C [facility]        Today's Diagnoses     New daily persistent headache    -  1    Benign essential hypertension          Care Instructions    Start amlodipine as directed  If headache is not improved in 1-2 days, come back for recheck  Follow up with Dr. Davey  Keep follow up with Makenna  Get labs done  Return to clinic for any new or worsening symptoms or go to ER Urgent care in off hours    Self-Care for Headaches  Most headaches aren't serious and can be relieved with self-care. But some headaches may be a sign of another health problem like eye trouble or high blood pressure. To find the best treatment, learn what kind of headaches you get. For tension headaches, self-care will usually help. To treat migraines, ask your healthcare provider for advice. It is also possible to get both tension and migraine headaches. Self-care involves relieving the pain and avoiding headache  triggers  if you can.    Ways to reduce pain and tension  Try these steps:    Apply a cold compress or ice pack to the pain site.    Drink fluids. If nausea makes it hard to drink, try sucking on ice.    Rest. Protect yourself from bright light and loud noises.    Calm your emotions by imagining a peaceful scene.    Massage tight neck, shoulder, and head muscles.    To relax muscles, soak in a hot bath or use a hot shower.  Use medicines  Aspirin or aspirin substitutes, such as ibuprofen and acetaminophen, can relieve headache. Remember: Never give aspirin to anyone 18 years old or younger because of the risk of developing Reye syndrome. Use pain medicines only when necessary.  Track your headaches  Keeping a headache diary can help you and your healthcare  "provider identify what's causing your headaches:    Note when each headache happens.    Identify your activities and the foods you've eaten 6 to 8 hours before the headache began.    Look for any trends or \"triggers.\"  Signs of tension headache  Any of the following can be signs:    Dull pain or feeling of pressure in a tight band around your head    Pain in your neck or shoulders    Headache without a definite beginning or end    Headache after an activity such as driving or working on a computer  Signs of migraine  Any of the following can be signs:    Throbbing pain on one or both sides of your head    Nausea or vomiting    Extreme sensitivity to light, sound, and smells    Bright spots, flashes, or other visual changes    Pain or nausea so severe that you can't continue your daily activities  Call your healthcare provider   If you have any of the following symptoms, contact your healthcare provider:    A headache that lingers after a recent injury or bump to the head.    A fever with a stiff neck or pain when you bend your head toward your chest.    A headache along with slurred speech, changes in your vision, or numbness or weakness in your arms or legs.    A headache for longer than 3 days.    Frequent headaches, especially in the morning.    Headaches with seizures     Seek immediate medical attention if you have a headache that you would call \"the worst headache you have ever had.\"   Date Last Reviewed: 10/4/2015    6833-5861 [employer]. [address] [place] [address]. All rights reserved. This information is not intended as a substitute for professional medical care. Always follow your healthcare professional's instructions.                Follow-ups after your visit        Your next 10 appointments already scheduled     Apr 24, 2018  2:30 PM CDT   SHORT with Nicole Davey MD   [facility] ([facility]    [address] [place] [address] [address]  [facility] [address]   [phone]            May 03, 2018  2:45 PM CDT   PHYSICAL with CARISA Austin CNP   [facility] ([facility])    [address] [place] [address]  [address]  [facility] [address]   [phone]            Aug 10, 2018 12:00 PM CDT   (Arrive by 11:45 AM)   CT CHEST W/O & W CONTRAST with UCCT2   [facility] ([facility])    [address]  1st Floor  [facility] [address] [phone]           Please bring any scans or X-rays taken at other hospitals, if similar tests were done. Also bring a list of your medicines, including vitamins, minerals and over-the-counter drugs. It is safest to leave personal items at home.  Be sure to tell your doctor:   If you have any allergies.   If there s any chance you are pregnant.   If you are breastfeeding.    If you have diabetes as your medication may need to be adjusted for this exam.  You will have contrast for this exam. To prepare:   Do not eat or drink for 2 hours before your exam. If you need to take medicine, you may take it with small sips of water. (We may ask you to take liquid medicine as well.)   The day before your exam, drink extra fluids at least six 8-ounce glasses (unless your doctor tells you to restrict your fluids).  Patients over 70 or patients with diabetes or kidney problems:   If you haven t had a blood test (creatinine test) within the last 30 days, the Cardiologist/Radiologist may require you to get this test prior to your exam.  Please wear loose clothing, such as a sweat suit or jogging clothes. Avoid snaps, zippers and other metal. We may ask you to undress and put on a hospital gown.  If you have any questions, please call the Imaging Department where you will have your exam.            Aug 10, 2018  1:00 PM CDT   (Arrive by 12:45 PM)   Return Visit with Susan Herzog MD   [facility] ([facility])    [address]  [address]  [facility] [address] [phone]              Who to contact     If you have questions or need follow up information about today's clinic visit or your schedule please contact [facility] directly at [phone].  Normal or non-critical lab and imaging results will be communicated to you by MyChart, letter or phone within 4 business days after the clinic has received the results. If you do not hear from us within 7 days, please contact the clinic through [employer] or phone. If you have a critical or abnormal lab result, we will notify you by phone as soon as possible.  Submit refill requests through [employer] or call your pharmacy and they will forward the refill request to us. Please allow 3 business days for your refill to be completed.          Additional Information About Your Visit        MyChart Information     [employer] gives you secure access to your electronic health record. If you see a primary care provider, you can also send messages to your care team and make appointments. If you have questions, please call your primary care clinic.  If you do not have a primary care provider, please call [phone] and they will assist you.        Care EveryWhere ID     This is your Care EveryWhere ID. This could be used by other organizations to access your [facility] medical records  [id number]        Your Vitals Were     Pulse Temperature Pulse Oximetry BMI (Body Mass Index)          110 98.4  F (36.9  C) (Oral) 97% 32.41 kg/m2         Blood Pressure from Last 3 Encounters:   04/24/18 (!) 152/100   02/01/18 153/87   12/19/17 160/87    Weight from Last 3 Encounters:   04/24/18 210 lb (95.3 kg)   02/01/18 208 lb 4.8 oz (94.5 kg)   12/19/17 211 lb 3.2 oz (95.8 kg)              We Performed the Following     *UA reflex to Microscopic     CBC with platelets     Comprehensive metabolic panel     TSH with free T4 reflex          Today's Medication  Changes          These changes are accurate as of 4/24/18  2:25 PM.  If you have any questions, ask your nurse or doctor.               Start taking these medicines.        Dose/Directions    amLODIPine 5 MG tablet   Commonly known as:  NORVASC   Used for:  Benign essential hypertension   Started by:  Anuradha Ku PA-C        Dose:  5 mg   Take 1 tablet (5 mg) by mouth daily   Quantity:  30 tablet   Refills:  0            Where to get your medicines      These medications were sent to [facility] [id number] [place] [address] AT [address] [address]     Phone:  [phone]     amLODIPine 5 MG tablet                Primary Care Provider Office Phone # Fax #    [facility] [phone]       [address] [address]        Equal Access to Services     KEL BURNETT : Hadii radha guaman hadasho Soomaali, waaxda luqadaha, qaybta kaalmada adeegyada, waxay vaniin hayaajuan m arriola . So [facility] [phone].    ATENCIÓN: Si habla español, tiene a murillo disposición servicios gratuitos de asistencia lingüística. Elliotame al [phone].    We comply with applicable federal civil rights laws and [place] laws. We do not discriminate on the basis of race, color, national origin, age, disability, sex, sexual orientation, or gender identity.            Thank you!     Thank you for choosing [facility]  for your care. Our goal is always to provide you with excellent care. Hearing back from our patients is one way we can continue to improve our services. Please take a few minutes to complete the written survey that you may receive in the mail after your visit with us. Thank you!             Your Updated Medication List - Protect others around you: Learn how to safely use, store and throw away your medicines at www.disposemymeds.org.          This list is accurate as of 4/24/18  2:25 PM.  Always  use your most recent med list.                   Brand Name Dispense Instructions for use Diagnosis    amLODIPine 5 MG tablet    NORVASC    30 tablet    Take 1 tablet (5 mg) by mouth daily    Benign essential hypertension       meloxicam 15 MG tablet    MOBIC     TK 1 T PO D        MIRENA (52 MG) 20 MCG/24HR IUD   Generic drug:  levonorgestrel     1 Intra Uterine Device    intrauterine uterine device placed    Surveillance of previously prescribed intrauterine contraceptive device, Insertion of IUD
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
## 2018-04-24 NOTE — PATIENT INSTRUCTIONS
"Start amlodipine as directed  If headache is not improved in 1-2 days, come back for recheck  Follow up with Dr. Davey  Keep follow up with Makenna  Get labs done  Return to clinic for any new or worsening symptoms or go to ER Urgent care in off hours    Self-Care for Headaches  Most headaches aren't serious and can be relieved with self-care. But some headaches may be a sign of another health problem like eye trouble or high blood pressure. To find the best treatment, learn what kind of headaches you get. For tension headaches, self-care will usually help. To treat migraines, ask your healthcare provider for advice. It is also possible to get both tension and migraine headaches. Self-care involves relieving the pain and avoiding headache  triggers  if you can.    Ways to reduce pain and tension  Try these steps:    Apply a cold compress or ice pack to the pain site.    Drink fluids. If nausea makes it hard to drink, try sucking on ice.    Rest. Protect yourself from bright light and loud noises.    Calm your emotions by imagining a peaceful scene.    Massage tight neck, shoulder, and head muscles.    To relax muscles, soak in a hot bath or use a hot shower.  Use medicines  Aspirin or aspirin substitutes, such as ibuprofen and acetaminophen, can relieve headache. Remember: Never give aspirin to anyone 18 years old or younger because of the risk of developing Reye syndrome. Use pain medicines only when necessary.  Track your headaches  Keeping a headache diary can help you and your healthcare provider identify what's causing your headaches:    Note when each headache happens.    Identify your activities and the foods you've eaten 6 to 8 hours before the headache began.    Look for any trends or \"triggers.\"  Signs of tension headache  Any of the following can be signs:    Dull pain or feeling of pressure in a tight band around your head    Pain in your neck or shoulders    Headache without a definite beginning or " "end    Headache after an activity such as driving or working on a computer  Signs of migraine  Any of the following can be signs:    Throbbing pain on one or both sides of your head    Nausea or vomiting    Extreme sensitivity to light, sound, and smells    Bright spots, flashes, or other visual changes    Pain or nausea so severe that you can't continue your daily activities  Call your healthcare provider   If you have any of the following symptoms, contact your healthcare provider:    A headache that lingers after a recent injury or bump to the head.    A fever with a stiff neck or pain when you bend your head toward your chest.    A headache along with slurred speech, changes in your vision, or numbness or weakness in your arms or legs.    A headache for longer than 3 days.    Frequent headaches, especially in the morning.    Headaches with seizures     Seek immediate medical attention if you have a headache that you would call \"the worst headache you have ever had.\"   Date Last Reviewed: 10/4/2015    9884-1256 [employer]. [address] [place] [address], [place] [address]. All rights reserved. This information is not intended as a substitute for professional medical care. Always follow your healthcare professional's instructions.        "

## 2018-04-25 RX ORDER — MELOXICAM 15 MG/1
TABLET ORAL
Qty: 30 TABLET | Refills: 0 | Status: CANCELLED | OUTPATIENT
Start: 2018-04-25

## 2018-04-25 RX ORDER — MELOXICAM 15 MG/1
15 TABLET ORAL DAILY
Qty: 30 TABLET | Refills: 0 | Status: SHIPPED | OUTPATIENT
Start: 2018-04-25 | End: 2018-05-03

## 2018-05-03 ENCOUNTER — OFFICE VISIT (OUTPATIENT)
Dept: OBGYN | Facility: CLINIC | Age: 38
End: 2018-05-03
Payer: COMMERCIAL

## 2018-05-03 ENCOUNTER — OFFICE VISIT (OUTPATIENT)
Dept: FAMILY MEDICINE | Facility: CLINIC | Age: 38
End: 2018-05-03
Payer: COMMERCIAL

## 2018-05-03 VITALS
BODY MASS INDEX: 32.41 KG/M2 | DIASTOLIC BLOOD PRESSURE: 82 MMHG | HEART RATE: 97 BPM | OXYGEN SATURATION: 96 % | WEIGHT: 210 LBS | TEMPERATURE: 98.4 F | SYSTOLIC BLOOD PRESSURE: 122 MMHG

## 2018-05-03 VITALS
DIASTOLIC BLOOD PRESSURE: 82 MMHG | TEMPERATURE: 98.4 F | HEART RATE: 97 BPM | SYSTOLIC BLOOD PRESSURE: 122 MMHG | OXYGEN SATURATION: 96 %

## 2018-05-03 DIAGNOSIS — I10 BENIGN ESSENTIAL HYPERTENSION: ICD-10-CM

## 2018-05-03 DIAGNOSIS — Z00.8 ENCOUNTER FOR BIOMETRIC SCREENING: Primary | ICD-10-CM

## 2018-05-03 DIAGNOSIS — N75.1 ABSCESS OF BARTHOLIN'S GLAND: Primary | ICD-10-CM

## 2018-05-03 DIAGNOSIS — N75.0 BARTHOLIN GLAND CYST: ICD-10-CM

## 2018-05-03 DIAGNOSIS — J30.89 CHRONIC NONSEASONAL ALLERGIC RHINITIS DUE TO OTHER ALLERGEN: ICD-10-CM

## 2018-05-03 DIAGNOSIS — I10 ESSENTIAL HYPERTENSION: Primary | ICD-10-CM

## 2018-05-03 PROCEDURE — 99024 POSTOP FOLLOW-UP VISIT: CPT | Performed by: OBSTETRICS & GYNECOLOGY

## 2018-05-03 PROCEDURE — 99214 OFFICE O/P EST MOD 30 MIN: CPT | Performed by: NURSE PRACTITIONER

## 2018-05-03 RX ORDER — AMLODIPINE BESYLATE 5 MG/1
5 TABLET ORAL DAILY
Qty: 90 TABLET | Refills: 1 | Status: SHIPPED | OUTPATIENT
Start: 2018-05-03 | End: 2018-11-12

## 2018-05-03 RX ORDER — FLUTICASONE PROPIONATE 50 MCG
1-2 SPRAY, SUSPENSION (ML) NASAL DAILY
Qty: 3 BOTTLE | Refills: 11 | Status: SHIPPED | OUTPATIENT
Start: 2018-05-03 | End: 2018-11-16

## 2018-05-03 RX ORDER — HYDROCODONE BITARTRATE AND ACETAMINOPHEN 5; 325 MG/1; MG/1
1 TABLET ORAL EVERY 6 HOURS PRN
Qty: 16 TABLET | Refills: 0 | Status: SHIPPED | OUTPATIENT
Start: 2018-05-03 | End: 2018-11-16

## 2018-05-03 NOTE — MR AVS SNAPSHOT
After Visit Summary   5/3/2018    Eliana Anders    MRN: [id number]           Patient Information     Date Of Birth          1980        Visit Information        Provider Department      5/3/2018 4:15 PM Danni García MD [facility]        Today's Diagnoses     Abscess of Bartholin's gland    -  1      Care Instructions    Sit in bath tub 20-25 minutes (hot water) twice daily for 7 days.  Return Wednesday at 2:30 to see Dr. Davey to remove Word catheter    If it falls out before then don't worry, we won't replace it.  Continue antibiotics until gone and use bath tub .    Call if fever, increased pain or if diarrhea develops    [phone]            Follow-ups after your visit        Your next 10 appointments already scheduled     May 09, 2018  2:30 PM CDT   SHORT with Nicole Davey MD   [facility] ([facility])    [address] [place] [address] [phone]            Aug 10, 2018 12:00 PM CDT   CT CHEST W/O & W CONTRAST with UCCT2   [facility] ([facility])    [address]  1st Floor  [facility] [address] [phone]           Please bring any scans or X-rays taken at other hospitals, if similar tests were done. Also bring a list of your medicines, including vitamins, minerals and over-the-counter drugs. It is safest to leave personal items at home.  Be sure to tell your doctor:   If you have any allergies.   If there s any chance you are pregnant.   If you are breastfeeding.    If you have diabetes as your medication may need to be adjusted for this exam.  You will have contrast for this exam. To prepare:   Do not eat or drink for 2 hours before your exam. If you need to take medicine, you may take it with small sips of water. (We may ask you to take liquid medicine as well.)   The day before your exam, drink extra fluids at least six  8-ounce glasses (unless your doctor tells you to restrict your fluids).  Patients over 70 or patients with diabetes or kidney problems:   If you haven t had a blood test (creatinine test) within the last 30 days, the Cardiologist/Radiologist may require you to get this test prior to your exam.  Please wear loose clothing, such as a sweat suit or jogging clothes. Avoid snaps, zippers and other metal. We may ask you to undress and put on a hospital gown.  If you have any questions, please call the Imaging Department where you will have your exam.            Aug 10, 2018  1:00 PM CDT   (Arrive by 12:45 PM)   Return Visit with Susan Herzog MD   [facility] ([facility])    [address] [place] [address]  [address] [place] [address] [phone]              Future tests that were ordered for you today     Open Future Orders        Priority Expected Expires Ordered    Lipid panel reflex to direct LDL Fasting Routine 5/10/2018 5/31/2018 5/3/2018    Glucose Routine 5/10/2018 5/31/2018 5/3/2018            Who to contact     If you have questions or need follow up information about today's clinic visit or your schedule please contact [facility] directly at [phone].  Normal or non-critical lab and imaging results will be communicated to you by MyChart, letter or phone within 4 business days after the clinic has received the results. If you do not hear from us within 7 days, please contact the clinic through MyChart or phone. If you have a critical or abnormal lab result, we will notify you by phone as soon as possible.  Submit refill requests through [employer] or call your pharmacy and they will forward the refill request to us. Please allow 3 business days for your refill to be completed.          Additional Information About Your Visit        [employer] Information     [employer] gives you secure access to your electronic health record. If you see a primary  care provider, you can also send messages to your care team and make appointments. If you have questions, please call your primary care clinic.  If you do not have a primary care provider, please call [phone] and they will assist you.        Care EveryWhere ID     This is your Care EveryWhere ID. This could be used by other organizations to access your [place] medical records  [id number]        Your Vitals Were     Pulse Temperature Pulse Oximetry BMI (Body Mass Index)          97 98.4  F (36.9  C) (Oral) 96% 32.41 kg/m2         Blood Pressure from Last 3 Encounters:   05/03/18 122/82   05/03/18 122/82   04/24/18 (!) 152/100    Weight from Last 3 Encounters:   05/03/18 210 lb (95.3 kg)   04/24/18 210 lb (95.3 kg)   02/01/18 208 lb 4.8 oz (94.5 kg)              Today, you had the following     No orders found for display         Today's Medication Changes          These changes are accurate as of 5/3/18  4:51 PM.  If you have any questions, ask your nurse or doctor.               Start taking these medicines.        Dose/Directions    amoxicillin-clavulanate 875-125 MG per tablet   Commonly known as:  AUGMENTIN   Used for:  Abscess of Bartholin's gland   Started by:  Danni García MD        Dose:  1 tablet   Take 1 tablet by mouth 2 times daily   Quantity:  14 tablet   Refills:  0       fluticasone 50 MCG/ACT spray   Commonly known as:  FLONASE   Used for:  Chronic nonseasonal allergic rhinitis due to other allergen   Started by:  Martha Davis APRN CNP        Dose:  1-2 spray   Spray 1-2 sprays into both nostrils daily   Quantity:  3 Bottle   Refills:  11       HYDROcodone-acetaminophen 5-325 MG per tablet   Commonly known as:  NORCO   Used for:  Bartholin gland cyst   Started by:  Martha Davis APRN CNP        Dose:  1 tablet   Take 1 tablet by mouth every 6 hours as needed for severe pain maximum 6 tablet(s) per day for maximum four days   Quantity:  16 tablet   Refills:  0            Where to get  your medicines      These medications were sent to [facility] - [place], [place] - [address] [place] [id number] [facility] [address]     Phone:  [phone]     amLODIPine 5 MG tablet    amoxicillin-clavulanate 875-125 MG per tablet    fluticasone 50 MCG/ACT spray         Some of these will need a paper prescription and others can be bought over the counter.  Ask your nurse if you have questions.     Bring a paper prescription for each of these medications     HYDROcodone-acetaminophen 5-325 MG per tablet                Primary Care Provider Office Phone # Fax #    [facility] [phone]       [address] [facility] [address]  [facility] [address]        Equal Access to Services     KEL BURNETT : Hadii aad ku hadasho Soomaali, waaxda luqadaha, qaybta kaalmada adeegyada, andre senior. So [facility] [phone].    ATENCIÓN: Si habla español, tiene a murillo disposición servicios gratuitos de asistencia lingüística. [facility] [phone].    We comply with applicable federal civil rights laws and [place] laws. We do not discriminate on the basis of race, color, national origin, age, disability, sex, sexual orientation, or gender identity.            Thank you!     Thank you for choosing [facility]  for your care. Our goal is always to provide you with excellent care. Hearing back from our patients is one way we can continue to improve our services. Please take a few minutes to complete the written survey that you may receive in the mail after your visit with us. Thank you!             Your Updated Medication List - Protect others around you: Learn how to safely use, store and throw away your medicines at www.disposemymeds.org.          This list is accurate as of 5/3/18  4:51 PM.  Always use your most recent med list.                   Brand Name Dispense Instructions for use Diagnosis    amLODIPine 5 MG tablet     NORVASC    90 tablet    Take 1 tablet (5 mg) by mouth daily    Benign essential hypertension       amoxicillin-clavulanate 875-125 MG per tablet    AUGMENTIN    14 tablet    Take 1 tablet by mouth 2 times daily    Abscess of Bartholin's gland       fluticasone 50 MCG/ACT spray    FLONASE    3 Bottle    Spray 1-2 sprays into both nostrils daily    Chronic nonseasonal allergic rhinitis due to other allergen       HYDROcodone-acetaminophen 5-325 MG per tablet    NORCO    16 tablet    Take 1 tablet by mouth every 6 hours as needed for severe pain maximum 6 tablet(s) per day for maximum four days    Bartholin gland cyst       MIRENA (52 MG) 20 MCG/24HR IUD   Generic drug:  levonorgestrel     1 Intra Uterine Device    intrauterine uterine device placed    Surveillance of previously prescribed intrauterine contraceptive device, Insertion of IUD

## 2018-05-03 NOTE — PROGRESS NOTES
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  She is tearful.    /82  Pulse 97  Temp 98.4  F (36.9  C) (Oral)  Wt 210 lb (95.3 kg)  SpO2 96%  BMI 32.41 kg/m2    .   INDICATIONS:                                                        Was a consent obtained?  Yes    Today's PHQ-2 Score:   PHQ-2 ( 1999 Pf[employer]) 5/3/2018   Q1: Little interest or pleasure in doing things 0   Q2: Feeling down, depressed or hopeless 0   PHQ-2 Score 0       This is a 38 year old  female who presents today with a tender and fluctuant Bartholin cyst measuring 2-3 cm.  The risks, benefits and assets of the procedure were discussed. Her questions were answered, informed consent was obtained, the informed consent document was signed, and she had no further questions.    PROCEDURE:                                                      The vulva was prepped with Betadine. Following the prep, 2 cc  of 1% Xylocaine was  injected into the skin over the lesion area. After adequate anesthesia was obtained, a number- 11 scalpel was used to open the abscess.  Purulent fluid was released.  A Word catheter was placed. The bulb was inflated. Bleeding was moderate    POST PROCEDURE:                                                      She  tolerated the procedure well. There were no complications. Patient was discharged in stable condition.    Return in 6 days to remove Word catheter.  Sitz baths BID.  Augmentin x 7 days, stop if diarrhea develops.  See written instructions.      Patient was informed to call the clinic if increasing pain, bleeding, or fever.    Danni García MD

## 2018-05-03 NOTE — MR AVS SNAPSHOT
"              After Visit Summary   5/3/2018    Eliana Anders    MRN: [id number]           Patient Information     Date Of Birth          1980        Visit Information        Provider Department      5/3/2018 2:45 PM Martha Davis APRN [facility]        Today's Diagnoses     Encounter for biometric screening    -  1    Benign essential hypertension        Chronic nonseasonal allergic rhinitis due to other allergen        Bartholin gland cyst          Care Instructions    Return for fasting labs - \"lab only visit\"  Fasting for 10-12 hours (water and pills are ok, but not other drinks or food)  Lab opens at 7:30 am          Follow-ups after your visit        Your next 10 appointments already scheduled     Aug 10, 2018 12:00 PM CDT   CT CHEST W/O & W CONTRAST with UCCT2   [facility] ([facility])    [address] [place] [address] [phone]           Please bring any scans or X-rays taken at other hospitals, if similar tests were done. Also bring a list of your medicines, including vitamins, minerals and over-the-counter drugs. It is safest to leave personal items at home.  Be sure to tell your doctor:   If you have any allergies.   If there s any chance you are pregnant.   If you are breastfeeding.    If you have diabetes as your medication may need to be adjusted for this exam.  You will have contrast for this exam. To prepare:   Do not eat or drink for 2 hours before your exam. If you need to take medicine, you may take it with small sips of water. (We may ask you to take liquid medicine as well.)   The day before your exam, drink extra fluids at least six 8-ounce glasses (unless your doctor tells you to restrict your fluids).  Patients over 70 or patients with diabetes or kidney problems:   If you haven t had a blood test (creatinine test) within the last 30 days, the Cardiologist/Radiologist may " require you to get this test prior to your exam.  Please wear loose clothing, such as a sweat suit or jogging clothes. Avoid snaps, zippers and other metal. We may ask you to undress and put on a hospital gown.  If you have any questions, please call the Imaging Department where you will have your exam.            Aug 10, 2018  1:00 PM CDT   (Arrive by 12:45 PM)   Return Visit with Susan Herzog MD   [facility] ([facility])    [address]  [address]  [facility] [address] [phone]              Who to contact     If you have questions or need follow up information about today's clinic visit or your schedule please contact [facility] directly at [phone].  Normal or non-critical lab and imaging results will be communicated to you by MyChart, letter or phone within 4 business days after the clinic has received the results. If you do not hear from us within 7 days, please contact the clinic through [employer] or phone. If you have a critical or abnormal lab result, we will notify you by phone as soon as possible.  Submit refill requests through [email or web address] or call your pharmacy and they will forward the refill request to us. Please allow 3 business days for your refill to be completed.          Additional Information About Your Visit        [employer] Information     [email or web address] gives you secure access to your electronic health record. If you see a primary care provider, you can also send messages to your care team and make appointments. If you have questions, please call your primary care clinic.  If you do not have a primary care provider, please call [phone] and they will assist you.        Care EveryWhere ID     This is your Care EveryWhere ID. This could be used by other organizations to access your [place] medical records  [id number]        Your Vitals Were     Pulse Temperature Pulse Oximetry             97 98.4  F (36.9  C)  (Oral) 96%          Blood Pressure from Last 3 Encounters:   05/11/18 (!) 139/92   05/03/18 122/82   05/03/18 122/82    Weight from Last 3 Encounters:   05/11/18 205 lb 4.8 oz (93.1 kg)   05/03/18 210 lb (95.3 kg)   04/24/18 210 lb (95.3 kg)                 Today's Medication Changes          These changes are accurate as of 5/3/18 11:59 PM.  If you have any questions, ask your nurse or doctor.               Start taking these medicines.        Dose/Directions    amoxicillin-clavulanate 875-125 MG per tablet   Commonly known as:  AUGMENTIN   Used for:  Abscess of Bartholin's gland   Started by:  Danni García MD        Dose:  1 tablet   Take 1 tablet by mouth 2 times daily   Quantity:  14 tablet   Refills:  0       fluticasone 50 MCG/ACT spray   Commonly known as:  FLONASE   Used for:  Chronic nonseasonal allergic rhinitis due to other allergen   Started by:  Martha Davis APRN CNP        Dose:  1-2 spray   Spray 1-2 sprays into both nostrils daily   Quantity:  3 Bottle   Refills:  11       HYDROcodone-acetaminophen 5-325 MG per tablet   Commonly known as:  NORCO   Used for:  Bartholin gland cyst   Started by:  Martha Davis APRN CNP        Dose:  1 tablet   Take 1 tablet by mouth every 6 hours as needed for severe pain maximum 6 tablet(s) per day for maximum four days   Quantity:  16 tablet   Refills:  0            Where to get your medicines      These medications were sent to [facility] [address] [address] [address]     Phone:  [phone]     amLODIPine 5 MG tablet    amoxicillin-clavulanate 875-125 MG per tablet    fluticasone 50 MCG/ACT spray         Some of these will need a paper prescription and others can be bought over the counter.  Ask your nurse if you have questions.     Bring a paper prescription for each of these medications     HYDROcodone-acetaminophen 5-325 MG per tablet               Information about  OPIOIDS     PRESCRIPTION OPIOIDS: WHAT YOU NEED TO KNOW   You have a prescription for an opioid (narcotic) pain medicine. Opioids can cause addiction. If you have a history of chemical dependency of any type, you are at a higher risk of becoming addicted to opioids. Only take this medicine after all other options have been tried. Take it for as short a time and as few doses as possible.     Do not:    Drive. If you drive while taking these medicines, you could be arrested for driving under the influence (DUI).    Operate heavy machinery    Do any other dangerous activities while taking these medicines.     Drink any alcohol while taking these medicines.      Take with any other medicines that contain acetaminophen. Read all labels carefully. Look for the word  acetaminophen  or  Tylenol.  Ask your pharmacist if you have questions or are unsure.    Store your pills in a secure place, locked if possible. We will not replace any lost or stolen medicine. If you don t finish your medicine, please throw away (dispose) as directed by your pharmacist. The [place] Pollution Control Agency has more information about safe disposal: https://www.pca.[facility].[place].us/living-green/managing-unwanted-medications    All opioids tend to cause constipation. Drink plenty of water and eat foods that have a lot of fiber, such as fruits, vegetables, prune juice, apple juice and high-fiber cereal. Take a laxative (Miralax, milk of magnesia, Colace, Senna) if you don t move your bowels at least every other day.          Primary Care Provider Office Phone # Fax #    [facility] [phone]       [address] [address]        Equal Access to Services     [facility] : Edwin Macario, waaxda luqadaha, qaybta kaalmada dav, andre senior. So [facility] [phone].    ATENCIÓN: Si habla español, tiene a murillo disposición servicios gratuitos de asistencia  lingüística. Jason al [phone].    We comply with applicable federal civil rights laws and [place] laws. We do not discriminate on the basis of race, color, national origin, age, disability, sex, sexual orientation, or gender identity.            Thank you!     Thank you for choosing [facility]  for your care. Our goal is always to provide you with excellent care. Hearing back from our patients is one way we can continue to improve our services. Please take a few minutes to complete the written survey that you may receive in the mail after your visit with us. Thank you!             Your Updated Medication List - Protect others around you: Learn how to safely use, store and throw away your medicines at www.disposemymeds.org.          This list is accurate as of 5/3/18 11:59 PM.  Always use your most recent med list.                   Brand Name Dispense Instructions for use Diagnosis    amLODIPine 5 MG tablet    NORVASC    90 tablet    Take 1 tablet (5 mg) by mouth daily    Benign essential hypertension       amoxicillin-clavulanate 875-125 MG per tablet    AUGMENTIN    14 tablet    Take 1 tablet by mouth 2 times daily    Abscess of Bartholin's gland       fluticasone 50 MCG/ACT spray    FLONASE    3 Bottle    Spray 1-2 sprays into both nostrils daily    Chronic nonseasonal allergic rhinitis due to other allergen       HYDROcodone-acetaminophen 5-325 MG per tablet    NORCO    16 tablet    Take 1 tablet by mouth every 6 hours as needed for severe pain maximum 6 tablet(s) per day for maximum four days    Bartholin gland cyst       MIRENA (52 MG) 20 MCG/24HR IUD   Generic drug:  levonorgestrel     1 Intra Uterine Device    intrauterine uterine device placed    Surveillance of previously prescribed intrauterine contraceptive device, Insertion of IUD

## 2018-05-03 NOTE — PATIENT INSTRUCTIONS
Sit in bath tub 20-25 minutes (hot water) twice daily for 7 days.  Return Wednesday at 2:30 to see Dr. Davey to remove Word catheter    If it falls out before then don't worry, we won't replace it.  Continue antibiotics until gone and use bath tub .    Call if fever, increased pain or if diarrhea develops    [phone]

## 2018-05-03 NOTE — PROGRESS NOTES
SUBJECTIVE:   Eliana Anders is a 38 year old female who presents to clinic today for the following health issues:    Hypertension Follow-up    Outpatient blood pressures are not being checked.    Did headaches resolve with new blood pressure medication?  No    If still having headaches:  Description of headaches:  As of now, a baby throb or infant headache    Location: frontal lobes     Character: throbbing pain   Frequency:  Not sure         Duration:  Not sure     Low Salt Diet: no added salt      Amount of exercise or physical activity: 6-7 days/week for an average of 15-30 minutes    Problems taking medications regularly: No    Medication side effects: none    Diet: regular (no restrictions)    BP Readings from Last 6 Encounters:   05/03/18 122/82   04/24/18 (!) 152/100   02/01/18 153/87   12/19/17 160/87   11/09/17 (!) 161/96   10/27/17 (!) 157/97     Rash - bartholin cyst follow-up      Duration: maybe 2 weeks     Description  Location: left side of vagina   Pain: moderate    Intensity:  moderate    Accompanying signs and symptoms: None but think it is coming back. A round shape     History (similar episodes/previous evaluation): Yes, had it drained last week.     Precipitating or alleviating factors:  New exposures:  None  Recent travel: no      Therapies tried and outcome: none    Questions/Concerns: Would like to know if we do biometric screening.   For work - needs to be fasting    Problem list and histories reviewed & adjusted, as indicated.  Additional history: as documented    Reviewed and updated as needed this visit by clinical staff  Tobacco  Allergies  Meds  Med Hx  Surg Hx  Fam Hx  Soc Hx      Reviewed and updated as needed this visit by Provider         ROS:  Constitutional, HEENT, cardiovascular, pulmonary, gi and gu systems are negative, except as otherwise noted.    OBJECTIVE:     /82  Pulse 97  Temp 98.4  F (36.9  C) (Oral)  SpO2 96%  There is no height or  "weight on file to calculate BMI.  GENERAL: alert and moderate distress  NECK: no adenopathy, no asymmetry, masses, or scars and thyroid normal to palpation  RESP: lungs clear to auscultation - no rales, rhonchi or wheezes  CV: regular rate and rhythm, normal S1 S2, no S3 or S4, no murmur, click or rub, no peripheral edema and peripheral pulses strong  ABDOMEN: soft, nontender, no hepatosplenomegaly, no masses and bowel sounds normal   (female): Bartholin's gland left side swollen and very painful  PSYCH: tearful, mood labile, swearing, but also apologetic     Diagnostic Test Results:  pending    ASSESSMENT/PLAN:     (Z01.89) Encounter for biometric screening  (primary encounter diagnosis)  Comment:   Plan: Lipid panel reflex to direct LDL Fasting,         Glucose            (I10) Benign essential hypertension  Comment:   Plan: amLODIPine (NORVASC) 5 MG tablet            (J30.89) Chronic nonseasonal allergic rhinitis due to other allergen  Comment:   Plan: fluticasone (FLONASE) 50 MCG/ACT spray            (N75.0) Bartholin gland cyst  Comment:   Plan: HYDROcodone-acetaminophen (NORCO) 5-325 MG per         tablet    Was to have Ob-GYN follow-up but came here for biometric screening and did not understand that needed separate Ob-GYN appt.  Very upset about wait and confusion about service to help with the cyst.  Able to discuss with OB-GYN and patient transferred to appt immediately with OB-GYN after evaluation      Patient Instructions   Return for fasting labs - \"lab only visit\"  Fasting for 10-12 hours (water and pills are ok, but not other drinks or food)  Lab opens at 7:30 am      CARISA [facility]            "

## 2018-05-03 NOTE — PATIENT INSTRUCTIONS
"Return for fasting labs - \"lab only visit\"  Fasting for 10-12 hours (water and pills are ok, but not other drinks or food)  Lab opens at 7:30 am  "

## 2018-05-03 NOTE — LETTER
[facility]  [address]  [place] [address] [phone]      May 3, 2018      Eliana Anders  [address]  [place] [address]              To Whom It May Concern:    Eliana Anders is being seen in our clinic and had a procedure today.  She is to be off work for 48 hours.  She may return to work without restriction after that.       Sincerely,            Danni García MD

## 2018-05-11 ENCOUNTER — OFFICE VISIT (OUTPATIENT)
Dept: OBGYN | Facility: CLINIC | Age: 38
End: 2018-05-11
Payer: COMMERCIAL

## 2018-05-11 VITALS
HEART RATE: 97 BPM | SYSTOLIC BLOOD PRESSURE: 139 MMHG | WEIGHT: 205.3 LBS | TEMPERATURE: 98.2 F | DIASTOLIC BLOOD PRESSURE: 92 MMHG | BODY MASS INDEX: 31.68 KG/M2

## 2018-05-11 DIAGNOSIS — N75.1 ABSCESS OF BARTHOLIN'S GLAND: Primary | ICD-10-CM

## 2018-05-11 PROCEDURE — 99024 POSTOP FOLLOW-UP VISIT: CPT | Performed by: OBSTETRICS & GYNECOLOGY

## 2018-05-11 NOTE — MR AVS SNAPSHOT
After Visit Summary   5/11/2018    Eliana Anders    MRN: [id number]           Patient Information     Date Of Birth          1980        Visit Information        Provider Department      5/11/2018 1:00 PM Nicole Davey MD [facility]        Today's Diagnoses     Abscess of Bartholin's gland    -  1       Follow-ups after your visit        Your next 10 appointments already scheduled     Aug 10, 2018 12:00 PM CDT   CT CHEST W/O & W CONTRAST with UCCT2   [facility] [place] [facility] ([facility])    [address] [address] [phone]           Please bring any scans or X-rays taken at other hospitals, if similar tests were done. Also bring a list of your medicines, including vitamins, minerals and over-the-counter drugs. It is safest to leave personal items at home.  Be sure to tell your doctor:   If you have any allergies.   If there s any chance you are pregnant.   If you are breastfeeding.    If you have diabetes as your medication may need to be adjusted for this exam.  You will have contrast for this exam. To prepare:   Do not eat or drink for 2 hours before your exam. If you need to take medicine, you may take it with small sips of water. (We may ask you to take liquid medicine as well.)   The day before your exam, drink extra fluids at least six 8-ounce glasses (unless your doctor tells you to restrict your fluids).  Patients over 70 or patients with diabetes or kidney problems:   If you haven t had a blood test (creatinine test) within the last 30 days, the Cardiologist/Radiologist may require you to get this test prior to your exam.  Please wear loose clothing, such as a sweat suit or jogging clothes. Avoid snaps, zippers and other metal. We may ask you to undress and put on a hospital gown.  If you have any questions, please call the Imaging Department where you will have your exam.             Aug 10, 2018  1:00 PM CDT   (Arrive by 12:45 PM)   Return Visit with Susan Herzog MD   [facility]    [address]  [address] [place] [address] [phone]              Who to contact     If you have questions or need follow up information about today's clinic visit or your schedule please contact [facility] directly at [phone].  Normal or non-critical lab and imaging results will be communicated to you by CreationFlowhart, letter or phone within 4 business days after the clinic has received the results. If you do not hear from us within 7 days, please contact the clinic through [facility] or phone. If you have a critical or abnormal lab result, we will notify you by phone as soon as possible.  Submit refill requests through BitMethod or call your pharmacy and they will forward the refill request to us. Please allow 3 business days for your refill to be completed.          Additional Information About Your Visit        CreationFlowharTeraView Information     BitMethod gives you secure access to your electronic health record. If you see a primary care provider, you can also send messages to your care team and make appointments. If you have questions, please call your primary care clinic.  If you do not have a primary care provider, please call [phone] and they will assist you.        Care EveryWhere ID     This is your Care EveryWhere ID. This could be used by other organizations to access your [facility] medical records  [id number]        Your Vitals Were     Pulse Temperature BMI (Body Mass Index)             97 98.2  F (36.8  C) (Oral) 31.68 kg/m2          Blood Pressure from Last 3 Encounters:   05/11/18 (!) 139/92   05/03/18 122/82   05/03/18 122/82    Weight from Last 3 Encounters:   05/11/18 205 lb 4.8 oz (93.1 kg)   05/03/18 210 lb (95.3 kg)   04/24/18 210 lb (95.3 kg)              Today, you had the following     No orders found  for display       Primary Care Provider Office Phone # Fax #    [facility] [phone]       [address] [place] [address]        Equal Access to Services     KEL BURNETT : Hadii aad ku hadpato Soadrianali, waaxda luqadaha, qaybta kaalmada adeegyada, andre junejuan m holcombharry rees flako senior. So [facility] [phone].    ATENCIÓN: Si habla español, tiene a murillo disposición servicios gratuitos de asistencia lingüística. Jason al [phone].    We comply with applicable federal civil rights laws and [place] laws. We do not discriminate on the basis of race, color, national origin, age, disability, sex, sexual orientation, or gender identity.            Thank you!     Thank you for choosing [facility]  for your care. Our goal is always to provide you with excellent care. Hearing back from our patients is one way we can continue to improve our services. Please take a few minutes to complete the written survey that you may receive in the mail after your visit with us. Thank you!             Your Updated Medication List - Protect others around you: Learn how to safely use, store and throw away your medicines at www.disposemymeds.org.          This list is accurate as of 5/11/18  1:18 PM.  Always use your most recent med list.                   Brand Name Dispense Instructions for use Diagnosis    amLODIPine 5 MG tablet    NORVASC    90 tablet    Take 1 tablet (5 mg) by mouth daily    Benign essential hypertension       amoxicillin-clavulanate 875-125 MG per tablet    AUGMENTIN    14 tablet    Take 1 tablet by mouth 2 times daily    Abscess of Bartholin's gland       fluticasone 50 MCG/ACT spray    FLONASE    3 Bottle    Spray 1-2 sprays into both nostrils daily    Chronic nonseasonal allergic rhinitis due to other allergen       HYDROcodone-acetaminophen 5-325 MG per tablet    NORCO    16 tablet    Take 1 tablet by mouth every 6 hours as needed for severe pain  maximum 6 tablet(s) per day for maximum four days    Bartholin gland cyst       MIRENA (52 MG) 20 MCG/24HR IUD   Generic drug:  levonorgestrel     1 Intra Uterine Device    intrauterine uterine device placed    Surveillance of previously prescribed intrauterine contraceptive device, Insertion of IUD

## 2018-05-11 NOTE — NURSING NOTE
"Chief Complaint   Patient presents with     Follow Up For     removal of word catheter       Initial BP (!) 139/92  Pulse 97  Temp 98.2  F (36.8  C) (Oral)  Wt 205 lb 4.8 oz (93.1 kg)  BMI 31.68 kg/m2 Estimated body mass index is 31.68 kg/(m^2) as calculated from the following:    Height as of 18: 5' 7.5\" (1.715 m).    Weight as of this encounter: 205 lb 4.8 oz (93.1 kg).  BP completed using cuff size: regular        The following HM Due: NONE      The following patient reported/Care Every where data was sent to:  P ABSTRACT QUALITY INITIATIVES [84641]        patient has appointment for today  Rocio Javier                "

## 2018-09-07 ENCOUNTER — RADIANT APPOINTMENT (OUTPATIENT)
Dept: CT IMAGING | Facility: CLINIC | Age: 38
End: 2018-09-07
Attending: CLINICAL NURSE SPECIALIST
Payer: COMMERCIAL

## 2018-09-07 ENCOUNTER — OFFICE VISIT (OUTPATIENT)
Dept: PULMONOLOGY | Facility: CLINIC | Age: 38
End: 2018-09-07
Attending: INTERNAL MEDICINE
Payer: COMMERCIAL

## 2018-09-07 VITALS
RESPIRATION RATE: 16 BRPM | SYSTOLIC BLOOD PRESSURE: 128 MMHG | TEMPERATURE: 99.1 F | OXYGEN SATURATION: 98 % | HEART RATE: 72 BPM | HEIGHT: 68 IN | WEIGHT: 201.13 LBS | BODY MASS INDEX: 30.48 KG/M2 | DIASTOLIC BLOOD PRESSURE: 82 MMHG

## 2018-09-07 DIAGNOSIS — R91.8 PULMONARY NODULES: Primary | ICD-10-CM

## 2018-09-07 DIAGNOSIS — R91.1 LUNG NODULE: ICD-10-CM

## 2018-09-07 PROCEDURE — G0463 HOSPITAL OUTPT CLINIC VISIT: HCPCS | Mod: ZF

## 2018-09-07 ASSESSMENT — PAIN SCALES - GENERAL: PAINLEVEL: NO PAIN (0)

## 2018-09-07 NOTE — NURSING NOTE
"Oncology Rooming Note    September 7, 2018 12:02 PM   [place] Bishnu Anders is a 38 year old female who presents for:    Chief Complaint   Patient presents with     Oncology Clinic Visit     Return: Lesion of Right Lung     Initial Vitals: /82  Pulse 72  Temp 99.1  F (37.3  C) (Oral)  Resp 16  Ht 1.715 m (5' 7.5\")  Wt 91.2 kg (201 lb 2 oz)  SpO2 98%  Breastfeeding? No  BMI 31.04 kg/m2 Estimated body mass index is 31.04 kg/(m^2) as calculated from the following:    Height as of this encounter: 1.715 m (5' 7.5\").    Weight as of this encounter: 91.2 kg (201 lb 2 oz). Body surface area is 2.08 meters squared.  No Pain (0) Comment: Data Unavailable   No LMP recorded. Patient is not currently having periods (Reason: IUD).  Allergies reviewed: Yes  Medications reviewed: Yes    Medications: Medication refills not needed today.  Pharmacy name entered into [employer]: [id number] [address]    Clinical concerns: no new concerns today Dr. Herzog was notified.    10 minutes for nursing intake (face to face time)     Isai Castillo CMA              "

## 2018-09-07 NOTE — MR AVS SNAPSHOT
After Visit Summary   9/7/2018    Eliana Anders    MRN: [id number]           Patient Information     Date Of Birth          1980        Visit Information        Provider Department      9/7/2018 12:30 PM Susan Herzog MD [facility]        Today's Diagnoses     Pulmonary nodules    -  1       Follow-ups after your visit        Your next 10 appointments already scheduled     Sep 04, 2019  2:00 PM CDT   CT CHEST W/O CONTRAST with UCCT1   [facility] [place] [address] ([facility])    [address] [address] [phone]           How do I prepare for my exam? (Food and drink instructions) No Food and Drink Restrictions.  How do I prepare for my exam? (Other instructions) You do not need to do anything special to prepare for this exam. For a sinus scan: Use your nose spray (nasal decongestant spray) as directed.  What should I wear: Please wear loose clothing, such as a sweat suit or jogging clothes. Avoid snaps, zippers and other metal. We may ask you to undress and put on a hospital gown.  How long does the exam take: Most scans take less than 20 minutes.  What should I bring: Please bring any scans or X-rays taken at other hospitals, if similar tests were done. Also bring a list of your medicines, including vitamins, minerals and over-the-counter drugs. It is safest to leave personal items at home.  Do I need a : No  is needed.  What do I need to tell my doctor? Be sure to tell your doctor: * If you have any allergies. * If there s any chance you are pregnant. * If you are breastfeeding.  What should I do after the exam: No restrictions, You may resume normal activities.  What is this test: A CT (computed tomography) scan is a series of pictures that allows us to look inside your body. The scanner creates images of the body in cross sections, much like slices of bread. This helps us see  any problems more clearly.  Who should I call with questions: If you have any questions, please call the Imaging Department where you will have your exam. Directions, parking instructions, and other information is available on our website, [email or web address].            Sep 04, 2019  2:45 PM CDT   (Arrive by 2:30 PM)   Return Visit with Susan Herzog MD   [facility]    [address]  [address]  [place] [facility] [address] [phone]              Future tests that were ordered for you today     Open Future Orders        Priority Expected Expires Ordered    CT Chest w/o contrast Routine 9/7/2019 9/7/2019 9/7/2018            Who to contact     If you have questions or need follow up information about today's clinic visit or your schedule please contact [facility] directly at [phone].  Normal or non-critical lab and imaging results will be communicated to you by MyChart, letter or phone within 4 business days after the clinic has received the results. If you do not hear from us within 7 days, please contact the clinic through [employer] Relichart or phone. If you have a critical or abnormal lab result, we will notify you by phone as soon as possible.  Submit refill requests through Pirq or call your pharmacy and they will forward the refill request to us. Please allow 3 business days for your refill to be completed.          Additional Information About Your Visit        New RelicharPayMins Information     Pirq gives you secure access to your electronic health record. If you see a primary care provider, you can also send messages to your care team and make appointments. If you have questions, please call your primary care clinic.  If you do not have a primary care provider, please call [phone] and they will assist you.        Care EveryWhere ID     This is your Care EveryWhere ID. This could be used by other organizations to access  "your [facility] medical records  [id number]        Your Vitals Were     Pulse Temperature Respirations Height Pulse Oximetry Breastfeeding?    72 99.1  F (37.3  C) (Oral) 16 1.715 m (5' 7.5\") 98% No    BMI (Body Mass Index)                   31.04 kg/m2            Blood Pressure from Last 3 Encounters:   09/07/18 128/82   05/11/18 (!) 139/92   05/03/18 122/82    Weight from Last 3 Encounters:   09/07/18 91.2 kg (201 lb 2 oz)   05/11/18 93.1 kg (205 lb 4.8 oz)   05/03/18 95.3 kg (210 lb)               Primary Care Provider Office Phone # Fax #    [facility] [phone]       [address] [address]        Equal Access to Services     KEL BURNETT : Hadii radha guaman hadasho Soomaali, waaxda luqadaha, qaybta kaalmada adeegyada, andre idiin hayaan mj arriola . So [facility] [phone].    ATENCIÓN: Si habla español, tiene a murillo disposición servicios gratuitos de asistencia lingüística. Jason al [phone].    We comply with applicable federal civil rights laws and [place] laws. We do not discriminate on the basis of race, color, national origin, age, disability, sex, sexual orientation, or gender identity.            Thank you!     Thank you for choosing [facility]  for your care. Our goal is always to provide you with excellent care. Hearing back from our patients is one way we can continue to improve our services. Please take a few minutes to complete the written survey that you may receive in the mail after your visit with us. Thank you!             Your Updated Medication List - Protect others around you: Learn how to safely use, store and throw away your medicines at www.disposemymeds.org.          This list is accurate as of 9/7/18 12:31 PM.  Always use your most recent med list.                   Brand Name Dispense Instructions for use Diagnosis    amLODIPine 5 MG tablet    NORVASC    90 tablet    Take 1 tablet (5 mg) by mouth daily "    Benign essential hypertension       amoxicillin-clavulanate 875-125 MG per tablet    AUGMENTIN    14 tablet    Take 1 tablet by mouth 2 times daily    Abscess of Bartholin's gland       fluticasone 50 MCG/ACT spray    FLONASE    3 Bottle    Spray 1-2 sprays into both nostrils daily    Chronic nonseasonal allergic rhinitis due to other allergen       HYDROcodone-acetaminophen 5-325 MG per tablet    NORCO    16 tablet    Take 1 tablet by mouth every 6 hours as needed for severe pain maximum 6 tablet(s) per day for maximum four days    Bartholin gland cyst       MIRENA (52 MG) 20 MCG/24HR IUD   Generic drug:  levonorgestrel     1 Intra Uterine Device    intrauterine uterine device placed    Surveillance of previously prescribed intrauterine contraceptive device, Insertion of IUD
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
## 2018-11-12 DIAGNOSIS — I10 BENIGN ESSENTIAL HYPERTENSION: ICD-10-CM

## 2018-11-12 RX ORDER — AMLODIPINE BESYLATE 5 MG/1
5 TABLET ORAL DAILY
Qty: 90 TABLET | Refills: 0 | Status: SHIPPED | OUTPATIENT
Start: 2018-11-12 | End: 2018-11-16

## 2018-11-12 NOTE — TELEPHONE ENCOUNTER
"Requested Prescriptions   Pending Prescriptions Disp Refills     amLODIPine (NORVASC) 5 MG tablet 90 tablet 1     Sig: Take 1 tablet (5 mg) by mouth daily    Calcium Channel Blockers Protocol  Passed    11/12/2018  8:31 AM       Passed - Blood pressure under 140/90 in past 12 months    BP Readings from Last 3 Encounters:   09/07/18 128/82   05/11/18 (!) 139/92   05/03/18 122/82                Passed - Recent (12 mo) or future (30 days) visit within the authorizing provider's specialty    Patient had office visit in the last 12 months or has a visit in the next 30 days with authorizing provider or within the authorizing provider's specialty.  See \"Patient Info\" tab in inbasket, or \"Choose Columns\" in Meds & Orders section of the refill encounter.             Passed - Patient is age 18 or older       Passed - No active pregnancy on record       Passed - Normal serum creatinine on file in past 12 months    Recent Labs   Lab Test  12/19/17   0938   CR  0.72            Passed - No positive pregnancy test in past 12 months        "

## 2018-11-12 NOTE — TELEPHONE ENCOUNTER
Prescription approved per [facility] Refill Protocol.    Candi Woods RN  [facility]
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
## 2018-11-16 ENCOUNTER — OFFICE VISIT (OUTPATIENT)
Dept: FAMILY MEDICINE | Facility: CLINIC | Age: 38
End: 2018-11-16
Payer: COMMERCIAL

## 2018-11-16 VITALS
HEART RATE: 105 BPM | DIASTOLIC BLOOD PRESSURE: 87 MMHG | BODY MASS INDEX: 32.19 KG/M2 | SYSTOLIC BLOOD PRESSURE: 127 MMHG | OXYGEN SATURATION: 98 % | TEMPERATURE: 97.8 F | WEIGHT: 208.6 LBS

## 2018-11-16 DIAGNOSIS — J20.9 ACUTE BRONCHITIS WITH SYMPTOMS > 10 DAYS: ICD-10-CM

## 2018-11-16 DIAGNOSIS — I10 BENIGN ESSENTIAL HYPERTENSION: Primary | ICD-10-CM

## 2018-11-16 DIAGNOSIS — S83.511A RUPTURE OF ANTERIOR CRUCIATE LIGAMENT OF RIGHT KNEE, INITIAL ENCOUNTER: ICD-10-CM

## 2018-11-16 DIAGNOSIS — Z30.431 SURVEILLANCE OF PREVIOUSLY PRESCRIBED INTRAUTERINE CONTRACEPTIVE DEVICE: ICD-10-CM

## 2018-11-16 DIAGNOSIS — M72.2 PLANTAR FASCIITIS: ICD-10-CM

## 2018-11-16 DIAGNOSIS — J31.0 CHRONIC RHINITIS: ICD-10-CM

## 2018-11-16 DIAGNOSIS — S83.206A ACUTE MENISCAL TEAR OF RIGHT KNEE, INITIAL ENCOUNTER: ICD-10-CM

## 2018-11-16 DIAGNOSIS — M70.51 PATELLAR BURSITIS OF RIGHT KNEE: ICD-10-CM

## 2018-11-16 LAB
ALBUMIN SERPL-MCNC: 3.8 G/DL (ref 3.4–5)
ALP SERPL-CCNC: 179 U/L (ref 40–150)
ALT SERPL W P-5'-P-CCNC: 102 U/L (ref 0–50)
ANION GAP SERPL CALCULATED.3IONS-SCNC: 7 MMOL/L (ref 3–14)
AST SERPL W P-5'-P-CCNC: 115 U/L (ref 0–45)
BILIRUB SERPL-MCNC: 0.4 MG/DL (ref 0.2–1.3)
BUN SERPL-MCNC: 8 MG/DL (ref 7–30)
CALCIUM SERPL-MCNC: 8.6 MG/DL (ref 8.5–10.1)
CHLORIDE SERPL-SCNC: 106 MMOL/L (ref 94–109)
CO2 SERPL-SCNC: 26 MMOL/L (ref 20–32)
CREAT SERPL-MCNC: 0.65 MG/DL (ref 0.52–1.04)
GFR SERPL CREATININE-BSD FRML MDRD: >90 ML/MIN/1.7M2
GLUCOSE SERPL-MCNC: 89 MG/DL (ref 70–99)
POTASSIUM SERPL-SCNC: 4.2 MMOL/L (ref 3.4–5.3)
PROT SERPL-MCNC: 7.7 G/DL (ref 6.8–8.8)
SODIUM SERPL-SCNC: 139 MMOL/L (ref 133–144)
TSH SERPL DL<=0.005 MIU/L-ACNC: 0.74 MU/L (ref 0.4–4)

## 2018-11-16 PROCEDURE — 36415 COLL VENOUS BLD VENIPUNCTURE: CPT | Performed by: NURSE PRACTITIONER

## 2018-11-16 PROCEDURE — 84443 ASSAY THYROID STIM HORMONE: CPT | Performed by: NURSE PRACTITIONER

## 2018-11-16 PROCEDURE — 99214 OFFICE O/P EST MOD 30 MIN: CPT | Performed by: NURSE PRACTITIONER

## 2018-11-16 PROCEDURE — 80053 COMPREHEN METABOLIC PANEL: CPT | Performed by: NURSE PRACTITIONER

## 2018-11-16 RX ORDER — DICLOFENAC SODIUM 75 MG/1
75 TABLET, DELAYED RELEASE ORAL 2 TIMES DAILY PRN
Qty: 60 TABLET | Refills: 1 | Status: SHIPPED | OUTPATIENT
Start: 2018-11-16 | End: 2019-08-06

## 2018-11-16 RX ORDER — AZITHROMYCIN 250 MG/1
TABLET, FILM COATED ORAL
Qty: 6 TABLET | Refills: 0 | Status: SHIPPED | OUTPATIENT
Start: 2018-11-16 | End: 2019-01-10

## 2018-11-16 RX ORDER — LORATADINE 10 MG/1
10 TABLET ORAL DAILY
Qty: 90 TABLET | Refills: 3 | Status: SHIPPED | OUTPATIENT
Start: 2018-11-16 | End: 2020-02-27

## 2018-11-16 RX ORDER — FLUTICASONE PROPIONATE 50 MCG
1-2 SPRAY, SUSPENSION (ML) NASAL DAILY
Qty: 1 BOTTLE | Refills: 11 | Status: SHIPPED | OUTPATIENT
Start: 2018-11-16 | End: 2020-03-03

## 2018-11-16 RX ORDER — AMLODIPINE BESYLATE 5 MG/1
5 TABLET ORAL DAILY
Qty: 90 TABLET | Refills: 1 | Status: SHIPPED | OUTPATIENT
Start: 2018-11-16 | End: 2019-08-16

## 2018-11-16 ASSESSMENT — PATIENT HEALTH QUESTIONNAIRE - PHQ9: SUM OF ALL RESPONSES TO PHQ QUESTIONS 1-9: 1

## 2018-11-16 NOTE — PATIENT INSTRUCTIONS
"    Plantar Fasciitis  1) stretch your foot in the morning - exercise band  2) roll your foot on a frozen water bottle and/or tennis ball  3) at work, you can rest your foot on an ice pack 15 minutes at a time - bags of corn and peas  4) consider a plantar fasciitis sock  5) inserts for your shoes - no sandals or barefoot - in the house think about getting \"house shoes\"  6) videos for plantar fasciitis taping (kinesiology) - Link: https://www.youRowbot Systemsube.com/watch?v=1jQv_CipqyU   7) look at book \"Every Woman's Guide to Foot Pain Relief\" - Danette Frances  8) calf stretches     Good arch supports (NOT Dr Small - if you can easily bend the insert in half through the arch it's not good, it needs to be on the stiffer side to more rigid) I have high arches as well and have liked Spenco inserts. But, there are a plenty of good OTC inserts out there.   We tape a lot in our clinic; we usually apply it and try to get as many days out of it as possible or just reapply if you're doing it yourself. As far as how long, there really is no such thing as too long. if it feels good, do it until you no longer have pain or until you're no longer needing it. We regularly apply it for a week or two. But, it could easily be longer if needed. I've attached a link to a youtube video below. It's very close to what we do in our clinic with exception of that we do not apply the tape to the top of the foot at the very end. Also, when applying the tape, holdyour foot inverted, this will help the tape support the foot better. We use athletic tape as well (1.5'' should be fine). I would use the same technique if you want to kinesio tape. I bet you get a hang of taping it yourself pretty quickly.   Also, not going barefoot means wearing good supportive shoes (athletic/work shoes) not slippers, and this includes around the house.   We also commonly recommend a short course of NSAIDs, something like 2 Aleve twice a day for 7 days or so (equivocal to " "prescription dosing), just to help get the inflammation down. Ice is good, as is massage.   Stretch your calf muscle (runners stretch) as your calf inserts into the same location as the plantar fascia originates from - therefore a tight calf only pulls on the plantar fascia and thus makes everything worse.\"     Knee - appears like patellar bursitis  Take prescription anti-inflammatory around the clock for the next 4 days  If the med I wrote isn't covered you could do Ibuprofen 600 mg every 6 hours    Allergies  Take claritin and flonase for at least 2 weeks    Respiratory infection  Take azithromycin  "

## 2018-11-16 NOTE — MR AVS SNAPSHOT
"              After Visit Summary   11/16/2018    Eliana Anders    MRN: [id number]           Patient Information     Date Of Birth          1980        Visit Information        Provider Department      11/16/2018 1:30 PM Martha Davis APRN [facility]        Today's Diagnoses     Essential hypertension    -  1    Surveillance of previously prescribed intrauterine contraceptive device        Benign essential hypertension        Plantar fasciitis        Patellar bursitis of right knee        Acute meniscal tear of right knee, initial encounter        Rupture of anterior cruciate ligament of right knee, initial encounter        Acute bronchitis with symptoms > 10 days        Chronic rhinitis          Care Instructions        Plantar Fasciitis  1) stretch your foot in the morning - exercise band  2) roll your foot on a frozen water bottle and/or tennis ball  3) at work, you can rest your foot on an ice pack 15 minutes at a time - bags of corn and peas  4) consider a plantar fasciitis sock  5) inserts for your shoes - no sandals or barefoot - in the house think about getting \"house shoes\"  6) videos for plantar fasciitis taping (kinesiology) - Link: https://www.[employer].com/watch?v=1jQv_CipqyU   7) look at book \"Every Woman's Guide to Foot Pain Relief\" - Danette Frances  8) calf stretches     Good arch supports (NOT Dr Small - if you can easily bend the insert in half through the arch it's not good, it needs to be on the stiffer side to more rigid) I have high arches as well and have liked Spenco inserts. But, there are a plenty of good OTC inserts out there.   We tape a lot in our clinic; we usually apply it and try to get as many days out of it as possible or just reapply if you're doing it yourself. As far as how long, there really is no such thing as too long. if it feels good, do it until you no longer have pain or until you're no longer needing it. We regularly apply it for " "a week or two. But, it could easily be longer if needed. I've attached a link to a youtube video below. It's very close to what we do in our clinic with exception of that we do not apply the tape to the top of the foot at the very end. Also, when applying the tape, holdyour foot inverted, this will help the tape support the foot better. We use athletic tape as well (1.5'' should be fine). I would use the same technique if you want to kinesio tape. I bet you get a hang of taping it yourself pretty quickly.   Also, not going barefoot means wearing good supportive shoes (athletic/work shoes) not slippers, and this includes around the house.   We also commonly recommend a short course of NSAIDs, something like 2 Aleve twice a day for 7 days or so (equivocal to prescription dosing), just to help get the inflammation down. Ice is good, as is massage.   Stretch your calf muscle (runners stretch) as your calf inserts into the same location as the plantar fascia originates from - therefore a tight calf only pulls on the plantar fascia and thus makes everything worse.\"     Knee - appears like patellar bursitis  Take prescription anti-inflammatory around the clock for the next 4 days  If the med I wrote isn't covered you could do Ibuprofen 600 mg every 6 hours    Allergies  Take claritin and flonase for at least 2 weeks    Respiratory infection  Take azithromycin          Follow-ups after your visit        Additional Services     SPORTS MEDICINE REFERRAL       Your provider has referred you to:  [facility]: Sports Medicine Clinic [facility] [phone]   [email or web address]    Please be aware that coverage of these services is subject to the terms and limitations of your health insurance plan.  Call member services at your health plan with any benefit or coverage questions.      Please bring the following to your appointment:    >>   Any x-rays, CTs or MRIs which have been performed.  " Contact the facility where they were done to arrange for  prior to your scheduled appointment.    >>   List of current medications   >>   This referral request   >>   Any documents/labs given to you for this referral                  Follow-up notes from your care team     Return in about 6 months (around 5/16/2019) for BP Recheck.      Your next 10 appointments already scheduled     Nov 29, 2018  3:15 PM CST   Office Visit with CARISA Austin CNP   [facility] ([facility]    [address] [place] [id number] [phone]           Bring a current list of meds and any records pertaining to this visit. For Physicals, please bring immunization records and any forms needing to be filled out. Please arrive 10 minutes early to complete paperwork.            Sep 04, 2019  2:00 PM CDT   CT CHEST W/O CONTRAST with UCCT1   [facility] ([facility])    [address]  1st Floor  [facility] [address] [phone]           How do I prepare for my exam? (Food and drink instructions) No Food and Drink Restrictions.  How do I prepare for my exam? (Other instructions) You do not need to do anything special to prepare for this exam. For a sinus scan: Use your nose spray (nasal decongestant spray) as directed.  What should I wear: Please wear loose clothing, such as a sweat suit or jogging clothes. Avoid snaps, zippers and other metal. We may ask you to undress and put on a hospital gown.  How long does the exam take: Most scans take less than 20 minutes.  What should I bring: Please bring any scans or X-rays taken at other hospitals, if similar tests were done. Also bring a list of your medicines, including vitamins, minerals and over-the-counter drugs. It is safest to leave personal items at home.  Do I need a : No  is needed.  What do I need to tell my doctor? Be sure to tell your doctor: * If  you have any allergies. * If there s any chance you are pregnant. * If you are breastfeeding.  What should I do after the exam: No restrictions, You may resume normal activities.  What is this test: A CT (computed tomography) scan is a series of pictures that allows us to look inside your body. The scanner creates images of the body in cross sections, much like slices of bread. This helps us see any problems more clearly.  Who should I call with questions: If you have any questions, please call the Imaging Department where you will have your exam. Directions, parking instructions, and other information is available on our website, [email or web address].            Sep 04, 2019  2:45 PM CDT   (Arrive by 2:30 PM)   Return Visit with Susan Herzog MD   [facility] ([facility])    [address] [place] [address]  [address] [place] [address] [phone]              Who to contact     If you have questions or need follow up information about today's clinic visit or your schedule please contact [facility] directly at [phone].  Normal or non-critical lab and imaging results will be communicated to you by MyChart, letter or phone within 4 business days after the clinic has received the results. If you do not hear from us within 7 days, please contact the clinic through [employer] or phone. If you have a critical or abnormal lab result, we will notify you by phone as soon as possible.  Submit refill requests through [employer] or call your pharmacy and they will forward the refill request to us. Please allow 3 business days for your refill to be completed.          Additional Information About Your Visit        [employer] Information     [employer] gives you secure access to your electronic health record. If you see a primary care provider, you can also send messages to your care team and make appointments. If you have questions, please call your primary care clinic.   If you do not have a primary care provider, please call [phone] and they will assist you.        Care EveryWhere ID     This is your Care EveryWhere ID. This could be used by other organizations to access your [facility] medical records  [id number]        Your Vitals Were     Pulse Temperature Pulse Oximetry BMI (Body Mass Index)          105 97.8  F (36.6  C) (Oral) 98% 32.19 kg/m2         Blood Pressure from Last 3 Encounters:   11/16/18 127/87   09/07/18 128/82   05/11/18 (!) 139/92    Weight from Last 3 Encounters:   11/16/18 208 lb 9.6 oz (94.6 kg)   09/07/18 201 lb 2 oz (91.2 kg)   05/11/18 205 lb 4.8 oz (93.1 kg)              We Performed the Following     Albumin Random Urine Quantitative with Creat Ratio     Comprehensive metabolic panel     SPORTS MEDICINE REFERRAL     TSH with free T4 reflex          Today's Medication Changes          These changes are accurate as of 11/16/18  2:44 PM.  If you have any questions, ask your nurse or doctor.               Start taking these medicines.        Dose/Directions    azithromycin 250 MG tablet   Commonly known as:  ZITHROMAX   Used for:  Acute bronchitis with symptoms > 10 days, Chronic rhinitis   Started by:  Martha Davis APRN CNP        Two tablets first day, then one tablet daily for four days.   Quantity:  6 tablet   Refills:  0       diclofenac 75 MG EC tablet   Commonly known as:  VOLTAREN   Used for:  Patellar bursitis of right knee   Started by:  Martha Davis APRN CNP        Dose:  75 mg   Take 1 tablet (75 mg) by mouth 2 times daily as needed for moderate pain   Quantity:  60 tablet   Refills:  1       loratadine 10 MG tablet   Commonly known as:  CLARITIN   Used for:  Chronic rhinitis   Started by:  Martha Davis APRN CNP        Dose:  10 mg   Take 1 tablet (10 mg) by mouth daily   Quantity:  90 tablet   Refills:  3       order for DME   Used for:  Plantar fasciitis   Started by:  Martha Davis APRN CNP        Equipment being ordered:  plantar fasciitis sock (knee high)   Quantity:  1 each   Refills:  0         Stop taking these medicines if you haven't already. Please contact your care team if you have questions.     amoxicillin-clavulanate 875-125 MG per tablet   Commonly known as:  AUGMENTIN   Stopped by:  Martha Davis APRN CNP           HYDROcodone-acetaminophen 5-325 MG per tablet   Commonly known as:  NORCO   Stopped by:  Martha Davis APRN CNP           MIRENA (52 MG) 20 MCG/24HR IUD   Generic drug:  levonorgestrel   Stopped by:  Martha Davis APRN CNP                Where to get your medicines      These medications were sent to [facility] [address] [place] [address] AT [address] [address]     Phone:  [phone]     amLODIPine 5 MG tablet    azithromycin 250 MG tablet    diclofenac 75 MG EC tablet    fluticasone 50 MCG/ACT spray    loratadine 10 MG tablet         Some of these will need a paper prescription and others can be bought over the counter.  Ask your nurse if you have questions.     Bring a paper prescription for each of these medications     order for DME                Primary Care Provider Office Phone # Fax #    [facility] [phone]       [address] [place] [id number]        Equal Access to Services     KEL BURNETT AH: Hadii radha guaman hadasho Soomaali, waaxda luqadaha, qaybta kaalmada adeegyada, andre idiin hayaan mj senior. So [facility] [phone].    ATENCIÓN: Si habla español, tiene a murillo disposición servicios gratuitos de asistencia lingüística. Elliotame al [phone].    We comply with applicable federal civil rights laws and [place] laws. We do not discriminate on the basis of race, color, national origin, age, disability, sex, sexual orientation, or gender identity.            Thank you!     Thank you for choosing [facility]  for your care. Our goal is always to provide you  with excellent care. Hearing back from our patients is one way we can continue to improve our services. Please take a few minutes to complete the written survey that you may receive in the mail after your visit with us. Thank you!             Your Updated Medication List - Protect others around you: Learn how to safely use, store and throw away your medicines at www.disposemymeds.org.          This list is accurate as of 11/16/18  2:44 PM.  Always use your most recent med list.                   Brand Name Dispense Instructions for use Diagnosis    amLODIPine 5 MG tablet    NORVASC    90 tablet    Take 1 tablet (5 mg) by mouth daily    Benign essential hypertension       azithromycin 250 MG tablet    ZITHROMAX    6 tablet    Two tablets first day, then one tablet daily for four days.    Acute bronchitis with symptoms > 10 days, Chronic rhinitis       diclofenac 75 MG EC tablet    VOLTAREN    60 tablet    Take 1 tablet (75 mg) by mouth 2 times daily as needed for moderate pain    Patellar bursitis of right knee       fluticasone 50 MCG/ACT spray    FLONASE    1 Bottle    Spray 1-2 sprays into both nostrils daily    Chronic rhinitis       loratadine 10 MG tablet    CLARITIN    90 tablet    Take 1 tablet (10 mg) by mouth daily    Chronic rhinitis       order for DME     1 each    Equipment being ordered: plantar fasciitis sock (knee high)    Plantar fasciitis

## 2018-11-21 ENCOUNTER — TELEPHONE (OUTPATIENT)
Dept: FAMILY MEDICINE | Facility: CLINIC | Age: 38
End: 2018-11-21

## 2018-11-21 NOTE — TELEPHONE ENCOUNTER
Makenna,    Spoke to patient. Notified of your lab result message. Pt stated that she does not take any herbs, supplements, or special teas. Stated that she does take tylenol almost every day.    She verbalized understanding to NO tylenol or alcohol until she returns to clinic. Has appt scheduled with you for 11/29/18.    Candi Woods RN  [facility]

## 2018-11-21 NOTE — TELEPHONE ENCOUNTER
Please call patient and let her know that she had an acute increase in her liver function tests.  I would like to see her next week for follow-up and recheck.  In the meantime, she should have no alcohol (yes, even with the holiday) and no tylenol.  Please ask if she has been taking any herbs or supplements or special teas - if so please note the names and ask her to stop those.  If she has fever, chills, vomiting, jaundice, or severe abdominal pain she should be seen sooner.  ALEKSANDER Ocampo

## 2018-11-29 ENCOUNTER — HOSPITAL ENCOUNTER (OUTPATIENT)
Dept: GENERAL RADIOLOGY | Facility: CLINIC | Age: 38
Discharge: HOME | End: 2018-11-29
Attending: NURSE PRACTITIONER | Admitting: NURSE PRACTITIONER
Payer: COMMERCIAL

## 2018-11-29 ENCOUNTER — OFFICE VISIT (OUTPATIENT)
Dept: FAMILY MEDICINE | Facility: CLINIC | Age: 38
End: 2018-11-29
Payer: COMMERCIAL

## 2018-11-29 VITALS
BODY MASS INDEX: 33.49 KG/M2 | HEART RATE: 77 BPM | DIASTOLIC BLOOD PRESSURE: 78 MMHG | SYSTOLIC BLOOD PRESSURE: 124 MMHG | OXYGEN SATURATION: 99 % | TEMPERATURE: 97.8 F | WEIGHT: 217 LBS

## 2018-11-29 DIAGNOSIS — R05.3 PERSISTENT COUGH FOR 3 WEEKS OR LONGER: Primary | ICD-10-CM

## 2018-11-29 DIAGNOSIS — R79.89 ELEVATED LFTS: ICD-10-CM

## 2018-11-29 LAB
APTT PPP: 32 SEC (ref 22–37)
INR PPP: 0.92 (ref 0.86–1.14)

## 2018-11-29 PROCEDURE — 85730 THROMBOPLASTIN TIME PARTIAL: CPT | Performed by: NURSE PRACTITIONER

## 2018-11-29 PROCEDURE — 99214 OFFICE O/P EST MOD 30 MIN: CPT | Performed by: NURSE PRACTITIONER

## 2018-11-29 PROCEDURE — 85610 PROTHROMBIN TIME: CPT | Performed by: NURSE PRACTITIONER

## 2018-11-29 PROCEDURE — 36415 COLL VENOUS BLD VENIPUNCTURE: CPT | Performed by: NURSE PRACTITIONER

## 2018-11-29 PROCEDURE — 80076 HEPATIC FUNCTION PANEL: CPT | Performed by: NURSE PRACTITIONER

## 2018-11-29 PROCEDURE — 71046 X-RAY EXAM CHEST 2 VIEWS: CPT

## 2018-11-29 PROCEDURE — 82977 ASSAY OF GGT: CPT | Performed by: NURSE PRACTITIONER

## 2018-11-29 RX ORDER — CODEINE PHOSPHATE AND GUAIFENESIN 10; 100 MG/5ML; MG/5ML
1 SOLUTION ORAL
Qty: 120 ML | Refills: 0 | Status: SHIPPED | OUTPATIENT
Start: 2018-11-29 | End: 2019-01-10

## 2018-11-29 NOTE — PROGRESS NOTES
SUBJECTIVE:   Eliana Anders is a 38 year old female who presents to clinic today for the following health issues:    Acute Illness   Acute illness concerns: cough  Onset: 2 months     Fever: no    Chills/Sweats: no    Headache (location?): YES- sometimes     Sinus Pressure:no    Conjunctivitis:  no    Ear Pain: no    Rhinorrhea: YES    Congestion: YES    Sore Throat: YES- sometimes     Cough: YES    Wheeze: no    Decreased Appetite: no    Nausea: YES    Vomiting: YES    Diarrhea:  YES- 1-2x    Dysuria/Freq.: no    Fatigue/Achiness: YES    Sick/Strep Exposure: no     Therapies Tried and outcome: Zithromax, claritin, flonase     LFTs elevated at last visit - 11/16/2018.  Was advised to stop any tylenol which she had been taking daily and drink no alcohol and have repeat labs today.  Patient reports she was able to do this.  Of note - she saw GI 12/2017 for follow-up to incidental liver lesion and had negative full hepatitis A,B and C testing and normal labs at that time.  GI discharged patient with no concerns.     Patient reports intermittent cough which is persistent over two months. Cough is describe as wet and productive. Denies SOB and Fever. Feels she has been bloated and notes a 11 lb weight gain since last visit.  No peripheral edema.  Stated that she was seen on 11/16/2018. Took the antibiotics and allergy medications as prescribed      Reviewed and updated as needed this visit by clinical staff  Tobacco  Allergies  Meds       Reviewed and updated as needed this visit by Provider    ROS:   ROS: 10 point ROS neg other than the symptoms noted above in the HPI.    OBJECTIVE:     /78 (BP Location: Left arm, Patient Position: Sitting, Cuff Size: Adult Large)  Pulse 77  Temp 97.8  F (36.6  C) (Oral)  Wt 217 lb (98.4 kg)  SpO2 99%  BMI 33.49 kg/m2  Body mass index is 33.49 kg/(m^2).  GENERAL: healthy, alert and no distress  EYES: Eyes grossly normal to inspection, PERRL and  "conjunctivae and sclerae normal, no scleral icterus  HENT: ear canals and TM's normal, nose and mouth without ulcers or lesions  NECK: no adenopathy, no asymmetry, masses, or scars and thyroid normal to palpation  RESP: lungs clear to auscultation - no rales, rhonchi or wheezes  CV: regular rate and rhythm, normal S1 S2, no S3 or S4, no murmur, click or rub, no peripheral edema and peripheral pulses strong  ABDOMEN: soft, nontender, no hepatosplenomegaly, no masses and bowel sounds normal; no ascites  SKIN: no suspicious lesions or rashes, no jaundice, no telangectasis , no bruising or petechiae    ASSESSMENT/PLAN:     1. Persistent cough for 3 weeks or longer  Comment: No red flag symptoms for pneumonia.   - XR Chest 2 Views  - guaiFENesin-codeine (ROBITUSSIN AC) 100-10 MG/5ML solution; Take 5 mLs by mouth nightly as needed for cough  Dispense: 120 mL; Refill: 0    2. Elevated LFTs  Comment: Unsure of etiology this maybe due to alcohol consumption and Tylenol intake.   Plain: Will do further diagnostic work to identify cause  - Hepatic panel  - GGT  - Partial thromboplastin time  - INR    Patient Instructions   Today after you leave, please go to the [facility] RAD[facility] for a chest xray.  To get there you   1.  Go to the first floor down the elevators  2.  Turn right and go to the end of the call  3.  Turn right through the door  4.  Turn right in the corridor to go toward the \"[facility]\"  5.  Go to the end of the long hallway  6.  Turn right to go to \"Diagnostic imaging/Radiology\"    You just need to show up - you do not need any paperwork    [facility] Login ID/username:  [id number]      Present and preformed physical exam with student nurse-family practice. The patient was evaluated and managed, by Kingston Becerra RN, student NP   RN, JOZEF in collaboration with CARISA Chin, LINDSEY supervising clinical preceptor.    Documentation written by CARISA Chin CNP    [facility] " [place]

## 2018-11-29 NOTE — MR AVS SNAPSHOT
"              After Visit Summary   11/29/2018    Eliana Anders    MRN: [id number]           Patient Information     Date Of Birth          1980        Visit Information        Provider Department      11/29/2018 3:15 PM Martha Davis APRN [facility]        Today's Diagnoses     Persistent cough for 3 weeks or longer    -  1    Elevated LFTs          Care Instructions    Today after you leave, please go to the [facility] RADIOLOGY for a chest xray.  To get there you   1.  Go to the first floor down the elevators  2.  Turn right and go to the end of the call  3.  Turn right through the door  4.  Turn right in the corridor to go toward the \"[facility]\"  5.  Go to the end of the long hallway  6.  Turn right to go to \"Diagnostic imaging/Radiology\"    You just need to show up - you do not need any paperwork    [employer] Login ID/username:  [id number]          Follow-ups after your visit        Follow-up notes from your care team     Return in about 1 week (around 12/6/2018) for Lab Work.      Your next 10 appointments already scheduled     Sep 04, 2019  2:00 PM CDT   CT CHEST W/O CONTRAST with UCCT1   [facility] [address] [facility] ([facility])    [address]  [address] [facility] [place] [address] [phone]           How do I prepare for my exam? (Food and drink instructions) No Food and Drink Restrictions.  How do I prepare for my exam? (Other instructions) You do not need to do anything special to prepare for this exam. For a sinus scan: Use your nose spray (nasal decongestant spray) as directed.  What should I wear: Please wear loose clothing, such as a sweat suit or jogging clothes. Avoid snaps, zippers and other metal. We may ask you to undress and put on a hospital gown.  How long does the exam take: Most scans take less than 20 minutes.  What should I bring: Please bring any scans or X-rays taken at other hospitals, if similar tests " were done. Also bring a list of your medicines, including vitamins, minerals and over-the-counter drugs. It is safest to leave personal items at home.  Do I need a : No  is needed.  What do I need to tell my doctor? Be sure to tell your doctor: * If you have any allergies. * If there s any chance you are pregnant. * If you are breastfeeding.  What should I do after the exam: No restrictions, You may resume normal activities.  What is this test: A CT (computed tomography) scan is a series of pictures that allows us to look inside your body. The scanner creates images of the body in cross sections, much like slices of bread. This helps us see any problems more clearly.  Who should I call with questions: If you have any questions, please call the Imaging Department where you will have your exam. Directions, parking instructions, and other information is available on our website, [email or web address].            Sep 04, 2019  2:45 PM CDT   (Arrive by 2:30 PM)   Return Visit with Susan Herzog MD   [facility] ([facility] [place])    [address] [place] [address]  [address] [place] [address] [phone]              Who to contact     If you have questions or need follow up information about today's clinic visit or your schedule please contact [facility] directly at [phone].  Normal or non-critical lab and imaging results will be communicated to you by MyChart, letter or phone within 4 business days after the clinic has received the results. If you do not hear from us within 7 days, please contact the clinic through MyChart or phone. If you have a critical or abnormal lab result, we will notify you by phone as soon as possible.  Submit refill requests through [employer] or call your pharmacy and they will forward the refill request to us. Please allow 3 business days for your refill to be completed.          Additional Information About Your  Visit        [facility] Information     [facility] gives you secure access to your electronic health record. If you see a primary care provider, you can also send messages to your care team and make appointments. If you have questions, please call your primary care clinic.  If you do not have a primary care provider, please call [phone] and they will assist you.        Care EveryWhere ID     This is your Care EveryWhere ID. This could be used by other organizations to access your [facility] medical records  [id number]        Your Vitals Were     Pulse Temperature Pulse Oximetry BMI (Body Mass Index)          77 97.8  F (36.6  C) (Oral) 99% 33.49 kg/m2         Blood Pressure from Last 3 Encounters:   11/29/18 124/78   11/16/18 127/87   09/07/18 128/82    Weight from Last 3 Encounters:   11/29/18 217 lb (98.4 kg)   11/16/18 208 lb 9.6 oz (94.6 kg)   09/07/18 201 lb 2 oz (91.2 kg)              We Performed the Following     GGT     Hepatic panel     INR     Partial thromboplastin time     XR Chest 2 Views          Today's Medication Changes          These changes are accurate as of 11/29/18  4:17 PM.  If you have any questions, ask your nurse or doctor.               Start taking these medicines.        Dose/Directions    guaiFENesin-codeine 100-10 MG/5ML solution   Commonly known as:  ROBITUSSIN AC   Used for:  Persistent cough for 3 weeks or longer   Started by:  Martha Davis APRN CNP        Dose:  1 tsp.   Take 5 mLs by mouth nightly as needed for cough   Quantity:  120 mL   Refills:  0            Where to get your medicines      Some of these will need a paper prescription and others can be bought over the counter.  Ask your nurse if you have questions.     Bring a paper prescription for each of these medications     guaiFENesin-codeine 100-10 MG/5ML solution                Primary Care Provider Office Phone # Fax #    [facility] [phone]       [address] [place] [address]  700  [facility] [address]        Equal Access to Services     [facility] HORTENCIA : Hadii aad ku hadpatholli Seraali, waaxda luqadaha, qaybta kaalmada dav, andre senior. So [facility] [phone].    ATENCIÓN: Si habla español, tiene a murillo disposición servicios gratuitos de asistencia lingüística. Jason al [phone].    We comply with applicable federal civil rights laws and [place] laws. We do not discriminate on the basis of race, color, national origin, age, disability, sex, sexual orientation, or gender identity.            Thank you!     Thank you for choosing [facility]  for your care. Our goal is always to provide you with excellent care. Hearing back from our patients is one way we can continue to improve our services. Please take a few minutes to complete the written survey that you may receive in the mail after your visit with us. Thank you!             Your Updated Medication List - Protect others around you: Learn how to safely use, store and throw away your medicines at www.disposemymeds.org.          This list is accurate as of 11/29/18  4:17 PM.  Always use your most recent med list.                   Brand Name Dispense Instructions for use Diagnosis    amLODIPine 5 MG tablet    NORVASC    90 tablet    Take 1 tablet (5 mg) by mouth daily    Benign essential hypertension       azithromycin 250 MG tablet    ZITHROMAX    6 tablet    Two tablets first day, then one tablet daily for four days.    Acute bronchitis with symptoms > 10 days       diclofenac 75 MG EC tablet    VOLTAREN    60 tablet    Take 1 tablet (75 mg) by mouth 2 times daily as needed for moderate pain    Patellar bursitis of right knee       fluticasone 50 MCG/ACT nasal spray    FLONASE    1 Bottle    Spray 1-2 sprays into both nostrils daily    Chronic rhinitis       guaiFENesin-codeine 100-10 MG/5ML solution    ROBITUSSIN AC    120 mL    Take 5 mLs by mouth nightly as needed for cough     Persistent cough for 3 weeks or longer       loratadine 10 MG tablet    CLARITIN    90 tablet    Take 1 tablet (10 mg) by mouth daily    Chronic rhinitis       order for DME     1 each    Equipment being ordered: plantar fasciitis sock (knee high)    Plantar fasciitis

## 2018-11-29 NOTE — PATIENT INSTRUCTIONS
"Today after you leave, please go to the [facility] RADIOLOGY for a chest xray.  To get there you   1.  Go to the first floor down the elevators  2.  Turn right and go to the end of the call  3.  Turn right through the door  4.  Turn right in the corridor to go toward the \"[facility]\"  5.  Go to the end of the long hallway  6.  Turn right to go to \"Diagnostic imaging/Radiology\"    You just need to show up - you do not need any paperwork    [employer] Login ID/username:  [id number]  "

## 2018-11-30 LAB
ALBUMIN SERPL-MCNC: 3.7 G/DL (ref 3.4–5)
ALP SERPL-CCNC: 102 U/L (ref 40–150)
ALT SERPL W P-5'-P-CCNC: 36 U/L (ref 0–50)
AST SERPL W P-5'-P-CCNC: 15 U/L (ref 0–45)
BILIRUB DIRECT SERPL-MCNC: 0.1 MG/DL (ref 0–0.2)
BILIRUB SERPL-MCNC: 0.4 MG/DL (ref 0.2–1.3)
GGT SERPL-CCNC: 354 U/L (ref 0–40)
PROT SERPL-MCNC: 7.2 G/DL (ref 6.8–8.8)

## 2018-12-07 ENCOUNTER — TELEPHONE (OUTPATIENT)
Dept: FAMILY MEDICINE | Facility: CLINIC | Age: 38
End: 2018-12-07

## 2018-12-07 DIAGNOSIS — R89.9 ABNORMAL LABORATORY TEST RESULT: Primary | ICD-10-CM

## 2018-12-07 NOTE — TELEPHONE ENCOUNTER
Liver function tests returned to normal, but the GGT is very elevated.  This is most likely related to having taken the robitussin AC recently.  I'd like to recheck in three months.  ALEKSANDER Ocampo

## 2018-12-10 NOTE — TELEPHONE ENCOUNTER
Received call from patient. Notified patient of provider message. Pt verbalized understanding. Scheduled lab only appt for 03/04/18 to repeat GGT.    Candi Woods RN  [facility]

## 2019-01-09 NOTE — PROGRESS NOTES
SUBJECTIVE:   Eliana Anders is a 38 year old female who presents to clinic today for the following health issues:    Rash  Onset: x 7 days    Description:   Location: Mid neck and up onto face  Character: blotchy, red  Itching (Pruritis): no     Progression of Symptoms:  same    Accompanying Signs & Symptoms:  Fever: no  Body aches or joint pain: no   Sore throat symptoms: no   Recent cold symptoms: YES - congestion, runny nose, sneezing, cough    History:   Previous similar rash: no     Precipitating factors:   Exposure to similar rash: no   New exposures: None   Recent travel: no     Alleviating factors:  none    Therapies Tried and outcome: none    Started on same day URI started.  Rash has been flat, but is superimposed over regular acne.  Rash is not itchy or painful.  No new products.  Neutragena orange box soap and noxema face pads and ada face moisturizer.  Using suave for hair, but hair is usually up.  Is still using claritin because the weather is up and down.      Current URI one week.  Congestion, runny nose, cough.  No fever.  Requests refill of cough syrup that was prescribed last year.  Current cough is keeping her up at night.    Problem list and histories reviewed & adjusted, as indicated.  Additional history: as documented      Reviewed and updated as needed this visit by clinical staff  Tobacco  Allergies  Meds  Med Hx  Surg Hx  Fam Hx  Soc Hx      Reviewed and updated as needed this visit by Provider         ROS:  CONSTITUTIONAL: NEGATIVE for fever, chills, change in weight  ENT/MOUTH: NEGATIVE for ear, mouth and throat problems  RESP: NEGATIVE for significant cough or SOB  CV: NEGATIVE for chest pain, palpitations or peripheral edema    OBJECTIVE:     /82 (BP Location: Right arm, Patient Position: Sitting, Cuff Size: Adult Regular)   Pulse 101   Temp 98.6  F (37  C) (Temporal)   Resp 18   Wt 97.1 kg (214 lb)   SpO2 95%   BMI 33.02 kg/m    Body mass index  "is 33.02 kg/m .  GENERAL: healthy, alert and no distress  EYES: Eyes grossly normal to inspection, PERRL and conjunctivae and sclerae normal  HENT: ear canals and TM's normal, nose and mouth without ulcers or lesions  NECK: no adenopathy, no asymmetry, masses, or scars and thyroid normal to palpation  RESP: lungs clear to auscultation - no rales, rhonchi or wheezes  CV: regular rate and rhythm, normal S1 S2, no S3 or S4, no murmur, click or rub, no peripheral edema and peripheral pulses strong  SKIN: faint erythematous rash of separate and coalescing macules on cheeks bilaterally and extending down neck bilaterally; nasal bridge is spared and central neck, chest and back are spared, left side of the neck has silvery patch    Diagnostic Test Results:  none     ASSESSMENT/PLAN:     Hypertension; controlled   Associated with the following complications:    None   Plan:  No changes in the patient's current treatment plan    (B35.8) Tinea faciale  (primary encounter diagnosis)  Comment:   Plan: clotrimazole (LOTRIMIN) 1 % external cream,         DERMATOLOGY REFERRAL, SKIN CARE REFERRAL            (R05) Persistent cough for 3 weeks or longer  Comment:   Plan: guaiFENesin-codeine (ROBITUSSIN AC) 100-10         MG/5ML solution   Has not used since last year.  Ok with use in current illness      Patient Instructions   Remember lab check of liver test at end of Feb/beginning March - \"lab only\" visit    Use the clotrimazole ointment on all the parts of the rash and even a little outside of the rash.  Apply twice a day. Stop using regular products - use just water.      If not getting better within two weeks then see derm    Fungal Skin Infection (Tinea)  A fungal infection occurs when too much fungus grows on or in the body. Fungus normally lives on the skin in small amounts and does not cause harm. But when too much grows on the skin, it causes an infection. This is also known as tinea. Fungal skin infections are common and " not usually serious.  The infection often starts as a small red area the size of a pea. The skin may turn dry and flaky. The area may itch. As the fungus grows, it spreads out in a red [place]. Because of how it looks, fungal skin infection is often called ringworm, but it is not caused by a worm. Fungal skin infections can occur on many parts of the body. They can grow on the head, chest, arms, or legs. They can occur on the buttocks. On the feet, fungal infection is known as  athlete s foot.  It causes itchy, sometimes painful sores between the toes and the bottom or sides of the feet. In the groin, the rash is called  jock itch.   People with weak immune systems can get a fungal infection more easily. This includes people with diabetes or HIV, or who are being treated for cancer. In these cases, the fungal infection can spread and cause severe illness. Fungal infections are also more common in people who are overweight.  In most cases, treatment is done with antifungal cream or ointment. If the infection is on your scalp, you may take oral medicine. In some cases, a tiny piece of the skin (biopsy) may be taken. This is so it can be tested in a lab.  Common fungal infections are treated with creams on the skin or oral medicine.  Home care  Follow all instructions when using antifungal cream or ointment on your skin. Your healthcare provider may advise using cornstarch powder to keep your skin dry or petroleum jelly to provide a barrier.  General care:    If you were prescribed an oral medicine, read the patient information. Talk with your healthcare provider about the risks and side effects.    Let your skin dry completely after bathing. Carefully dry your feet and between your toes.    Dress in loose cotton clothing.    Don t scratch the affected area. This can delay healing and may spread the infection. It can also cause a bacterial infection.    Keep your skin clean, but don t wash the skin too much. This can  irritate your skin.    Keep in mind that it may take a week before the fungus starts to go away. It can take 2 to 4 weeks to fully clear. To prevent it from coming back, use the medicine until the rash is all gone.  Follow-up care  Follow up with your healthcare provider if the rash does not get better after 10 days of treatment. Also follow up if the rash spreads to other parts of your body.  When to seek medical advice  Call your healthcare provider right away if any of these occur:    Fever of 100.4 F (38 C) or higher    Redness or swelling that gets worse    Pain that gets worse    Foul-smelling fluid leaking from the skin  Date Last Reviewed: 11/1/2016 2000-2018 [employer]. [address] [place] [address], [place] [id number]. All rights reserved. This information is not intended as a substitute for professional medical care. Always follow your healthcare professional's instructions.               CARISA Ware [facility]

## 2019-01-10 ENCOUNTER — OFFICE VISIT (OUTPATIENT)
Dept: FAMILY MEDICINE | Facility: CLINIC | Age: 39
End: 2019-01-10
Payer: COMMERCIAL

## 2019-01-10 VITALS
HEART RATE: 101 BPM | SYSTOLIC BLOOD PRESSURE: 138 MMHG | OXYGEN SATURATION: 95 % | BODY MASS INDEX: 33.02 KG/M2 | TEMPERATURE: 98.6 F | WEIGHT: 214 LBS | RESPIRATION RATE: 18 BRPM | DIASTOLIC BLOOD PRESSURE: 82 MMHG

## 2019-01-10 DIAGNOSIS — R05.3 PERSISTENT COUGH FOR 3 WEEKS OR LONGER: ICD-10-CM

## 2019-01-10 DIAGNOSIS — B35.8 TINEA FACIALE: Primary | ICD-10-CM

## 2019-01-10 PROCEDURE — 99214 OFFICE O/P EST MOD 30 MIN: CPT | Performed by: NURSE PRACTITIONER

## 2019-01-10 RX ORDER — CODEINE PHOSPHATE AND GUAIFENESIN 10; 100 MG/5ML; MG/5ML
1 SOLUTION ORAL
Qty: 120 ML | Refills: 0 | Status: SHIPPED | OUTPATIENT
Start: 2019-01-10 | End: 2019-08-06

## 2019-01-10 RX ORDER — CLOTRIMAZOLE 1 %
CREAM (GRAM) TOPICAL 2 TIMES DAILY
Qty: 113 G | Refills: 1 | Status: SHIPPED | OUTPATIENT
Start: 2019-01-10 | End: 2019-04-15

## 2019-01-10 NOTE — PATIENT INSTRUCTIONS
"Remember lab check of liver test at end of Feb/beginning March - \"lab only\" visit    Use the clotrimazole ointment on all the parts of the rash and even a little outside of the rash.  Apply twice a day. Stop using regular products - use just water.      If not getting better within two weeks then see derm    Fungal Skin Infection (Tinea)  A fungal infection occurs when too much fungus grows on or in the body. Fungus normally lives on the skin in small amounts and does not cause harm. But when too much grows on the skin, it causes an infection. This is also known as tinea. Fungal skin infections are common and not usually serious.  The infection often starts as a small red area the size of a pea. The skin may turn dry and flaky. The area may itch. As the fungus grows, it spreads out in a red [place]. Because of how it looks, fungal skin infection is often called ringworm, but it is not caused by a worm. Fungal skin infections can occur on many parts of the body. They can grow on the head, chest, arms, or legs. They can occur on the buttocks. On the feet, fungal infection is known as  athlete s foot.  It causes itchy, sometimes painful sores between the toes and the bottom or sides of the feet. In the groin, the rash is called  jock itch.   People with weak immune systems can get a fungal infection more easily. This includes people with diabetes or HIV, or who are being treated for cancer. In these cases, the fungal infection can spread and cause severe illness. Fungal infections are also more common in people who are overweight.  In most cases, treatment is done with antifungal cream or ointment. If the infection is on your scalp, you may take oral medicine. In some cases, a tiny piece of the skin (biopsy) may be taken. This is so it can be tested in a lab.  Common fungal infections are treated with creams on the skin or oral medicine.  Home care  Follow all instructions when using antifungal cream or ointment on your " skin. Your healthcare provider may advise using cornstarch powder to keep your skin dry or petroleum jelly to provide a barrier.  General care:    If you were prescribed an oral medicine, read the patient information. Talk with your healthcare provider about the risks and side effects.    Let your skin dry completely after bathing. Carefully dry your feet and between your toes.    Dress in loose cotton clothing.    Don t scratch the affected area. This can delay healing and may spread the infection. It can also cause a bacterial infection.    Keep your skin clean, but don t wash the skin too much. This can irritate your skin.    Keep in mind that it may take a week before the fungus starts to go away. It can take 2 to 4 weeks to fully clear. To prevent it from coming back, use the medicine until the rash is all gone.  Follow-up care  Follow up with your healthcare provider if the rash does not get better after 10 days of treatment. Also follow up if the rash spreads to other parts of your body.  When to seek medical advice  Call your healthcare provider right away if any of these occur:    Fever of 100.4 F (38 C) or higher    Redness or swelling that gets worse    Pain that gets worse    Foul-smelling fluid leaking from the skin  Date Last Reviewed: 11/1/2016 2000-2018 [employer]. [address] [place] [address], [place] [address]. All rights reserved. This information is not intended as a substitute for professional medical care. Always follow your healthcare professional's instructions.

## 2019-01-24 ENCOUNTER — OFFICE VISIT (OUTPATIENT)
Dept: FAMILY MEDICINE | Facility: CLINIC | Age: 39
End: 2019-01-24
Payer: COMMERCIAL

## 2019-01-24 VITALS — SYSTOLIC BLOOD PRESSURE: 128 MMHG | DIASTOLIC BLOOD PRESSURE: 78 MMHG | OXYGEN SATURATION: 100 % | HEART RATE: 86 BPM

## 2019-01-24 DIAGNOSIS — L30.9 DERMATITIS: Primary | ICD-10-CM

## 2019-01-24 LAB
KOH PREP SPEC: NORMAL
SPECIMEN SOURCE: NORMAL

## 2019-01-24 PROCEDURE — 99214 OFFICE O/P EST MOD 30 MIN: CPT | Performed by: FAMILY MEDICINE

## 2019-01-24 PROCEDURE — 87220 TISSUE EXAM FOR FUNGI: CPT | Performed by: FAMILY MEDICINE

## 2019-01-24 RX ORDER — DESONIDE 0.5 MG/G
CREAM TOPICAL 2 TIMES DAILY
Qty: 15 G | Refills: 0 | Status: SHIPPED | OUTPATIENT
Start: 2019-01-24 | End: 2019-01-29
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
## 2019-01-24 NOTE — PATIENT INSTRUCTIONS
FUTURE APPOINTMENTS  Follow up as needed.    Discontinue clotrimazole 1% cream    TOPICAL STEROID INSTRUCTIONS  Desonide 0.05% cream.  Apply two times per day for a maximum of 7 days. Then, use only when needed.  1. Wash hands before applying topical steroid.  2. Apply sparingly (just enough to rub in) onto affected areas of the face and neck.    This is a weak strength steroid, and it can be used on thinner skin areas such as the face.    After the initial treatment, topical steroid may be used as needed for flare-ups but only for short-term treatment. If you are using this for prolonged periods of time to control flare-ups, return to clinic for re-evaluation of treatment.    Keep in mind to also regularly use moisturizer, as this preventative measure can help maintain your skin's natural protective moisture barrier.    Make sure to apply a facial moisturizer such as your L'Oreal moisturizer regularly. You may also consider OTC (over-the-counter) CeraVe facial moisturizer.

## 2019-04-15 ENCOUNTER — HOSPITAL ENCOUNTER (EMERGENCY)
Facility: CLINIC | Age: 39
Discharge: HOME | End: 2019-04-16
Attending: EMERGENCY MEDICINE | Admitting: EMERGENCY MEDICINE
Payer: COMMERCIAL

## 2019-04-15 VITALS
TEMPERATURE: 98 F | RESPIRATION RATE: 16 BRPM | OXYGEN SATURATION: 98 % | DIASTOLIC BLOOD PRESSURE: 82 MMHG | BODY MASS INDEX: 35.41 KG/M2 | SYSTOLIC BLOOD PRESSURE: 135 MMHG | HEIGHT: 67 IN | WEIGHT: 225.6 LBS

## 2019-04-15 DIAGNOSIS — N75.1 BARTHOLIN'S GLAND ABSCESS: ICD-10-CM

## 2019-04-15 LAB
ALBUMIN UR-MCNC: NEGATIVE MG/DL
ANION GAP SERPL CALCULATED.3IONS-SCNC: 8 MMOL/L (ref 3–14)
APPEARANCE UR: CLEAR
BASOPHILS # BLD AUTO: 0 10E9/L (ref 0–0.2)
BASOPHILS NFR BLD AUTO: 0.2 %
BILIRUB UR QL STRIP: NEGATIVE
BUN SERPL-MCNC: 8 MG/DL (ref 7–30)
CALCIUM SERPL-MCNC: 8.2 MG/DL (ref 8.5–10.1)
CHLORIDE SERPL-SCNC: 105 MMOL/L (ref 94–109)
CO2 SERPL-SCNC: 26 MMOL/L (ref 20–32)
COLOR UR AUTO: ABNORMAL
CREAT SERPL-MCNC: 0.65 MG/DL (ref 0.52–1.04)
CRP SERPL-MCNC: 13.8 MG/L (ref 0–8)
DIFFERENTIAL METHOD BLD: NORMAL
EOSINOPHIL # BLD AUTO: 0.1 10E9/L (ref 0–0.7)
EOSINOPHIL NFR BLD AUTO: 1.1 %
ERYTHROCYTE [DISTWIDTH] IN BLOOD BY AUTOMATED COUNT: 13.8 % (ref 10–15)
ERYTHROCYTE [SEDIMENTATION RATE] IN BLOOD BY WESTERGREN METHOD: 15 MM/H (ref 0–20)
GFR SERPL CREATININE-BSD FRML MDRD: >90 ML/MIN/{1.73_M2}
GLUCOSE SERPL-MCNC: 119 MG/DL (ref 70–99)
GLUCOSE UR STRIP-MCNC: NEGATIVE MG/DL
HCG UR QL: NEGATIVE
HCT VFR BLD AUTO: 40.9 % (ref 35–47)
HGB BLD-MCNC: 13.6 G/DL (ref 11.7–15.7)
HGB UR QL STRIP: NEGATIVE
IMM GRANULOCYTES # BLD: 0 10E9/L (ref 0–0.4)
IMM GRANULOCYTES NFR BLD: 0.1 %
KETONES UR STRIP-MCNC: NEGATIVE MG/DL
LEUKOCYTE ESTERASE UR QL STRIP: NEGATIVE
LYMPHOCYTES # BLD AUTO: 2.6 10E9/L (ref 0.8–5.3)
LYMPHOCYTES NFR BLD AUTO: 28 %
MCH RBC QN AUTO: 32.6 PG (ref 26.5–33)
MCHC RBC AUTO-ENTMCNC: 33.3 G/DL (ref 31.5–36.5)
MCV RBC AUTO: 98 FL (ref 78–100)
MONOCYTES # BLD AUTO: 0.9 10E9/L (ref 0–1.3)
MONOCYTES NFR BLD AUTO: 9.7 %
NEUTROPHILS # BLD AUTO: 5.7 10E9/L (ref 1.6–8.3)
NEUTROPHILS NFR BLD AUTO: 60.9 %
NITRATE UR QL: NEGATIVE
NRBC # BLD AUTO: 0 10*3/UL
NRBC BLD AUTO-RTO: 0 /100
PH UR STRIP: 5.5 PH (ref 5–7)
PLATELET # BLD AUTO: 253 10E9/L (ref 150–450)
POTASSIUM SERPL-SCNC: 3.3 MMOL/L (ref 3.4–5.3)
RBC # BLD AUTO: 4.17 10E12/L (ref 3.8–5.2)
SODIUM SERPL-SCNC: 139 MMOL/L (ref 133–144)
SOURCE: ABNORMAL
SP GR UR STRIP: 1 (ref 1–1.03)
UROBILINOGEN UR STRIP-MCNC: NORMAL MG/DL (ref 0–2)
WBC # BLD AUTO: 9.4 10E9/L (ref 4–11)

## 2019-04-15 PROCEDURE — 86140 C-REACTIVE PROTEIN: CPT | Performed by: EMERGENCY MEDICINE

## 2019-04-15 PROCEDURE — 56420 I&D BARTHOLINS GLAND ABSCESS: CPT | Mod: Z6 | Performed by: EMERGENCY MEDICINE

## 2019-04-15 PROCEDURE — 81025 URINE PREGNANCY TEST: CPT | Performed by: EMERGENCY MEDICINE

## 2019-04-15 PROCEDURE — 80048 BASIC METABOLIC PNL TOTAL CA: CPT | Performed by: EMERGENCY MEDICINE

## 2019-04-15 PROCEDURE — 81003 URINALYSIS AUTO W/O SCOPE: CPT | Performed by: EMERGENCY MEDICINE

## 2019-04-15 PROCEDURE — 25000128 H RX IP 250 OP 636: Performed by: EMERGENCY MEDICINE

## 2019-04-15 PROCEDURE — 99284 EMERGENCY DEPT VISIT MOD MDM: CPT | Mod: 25 | Performed by: EMERGENCY MEDICINE

## 2019-04-15 PROCEDURE — 85025 COMPLETE CBC W/AUTO DIFF WBC: CPT | Performed by: EMERGENCY MEDICINE

## 2019-04-15 PROCEDURE — 85652 RBC SED RATE AUTOMATED: CPT | Performed by: EMERGENCY MEDICINE

## 2019-04-15 PROCEDURE — 56420 I&D BARTHOLINS GLAND ABSCESS: CPT

## 2019-04-15 PROCEDURE — 99285 EMERGENCY DEPT VISIT HI MDM: CPT | Mod: 25

## 2019-04-15 PROCEDURE — 96376 TX/PRO/DX INJ SAME DRUG ADON: CPT

## 2019-04-15 PROCEDURE — 96375 TX/PRO/DX INJ NEW DRUG ADDON: CPT

## 2019-04-15 PROCEDURE — 96365 THER/PROPH/DIAG IV INF INIT: CPT

## 2019-04-15 RX ORDER — OXYCODONE HYDROCHLORIDE 5 MG/1
5 TABLET ORAL EVERY 6 HOURS PRN
Qty: 12 TABLET | Refills: 0 | Status: ON HOLD | OUTPATIENT
Start: 2019-04-15 | End: 2019-08-12

## 2019-04-15 RX ORDER — DIPHENHYDRAMINE HCL 25 MG
25 TABLET ORAL EVERY 6 HOURS PRN
COMMUNITY
End: 2023-02-15

## 2019-04-15 RX ORDER — CEFTRIAXONE 2 G/1
2 INJECTION, POWDER, FOR SOLUTION INTRAMUSCULAR; INTRAVENOUS ONCE
Status: COMPLETED | OUTPATIENT
Start: 2019-04-15 | End: 2019-04-15

## 2019-04-15 RX ORDER — HYDROMORPHONE HYDROCHLORIDE 1 MG/ML
0.5 INJECTION, SOLUTION INTRAMUSCULAR; INTRAVENOUS; SUBCUTANEOUS
Status: DISCONTINUED | OUTPATIENT
Start: 2019-04-15 | End: 2019-04-16 | Stop reason: HOSPADM

## 2019-04-15 RX ORDER — CEPHALEXIN 500 MG/1
500 CAPSULE ORAL 4 TIMES DAILY
Qty: 28 CAPSULE | Refills: 0 | Status: ON HOLD | OUTPATIENT
Start: 2019-04-15 | End: 2019-08-08

## 2019-04-15 RX ADMIN — Medication 0.5 MG: at 22:53

## 2019-04-15 RX ADMIN — CEFTRIAXONE SODIUM 2 G: 2 INJECTION, POWDER, FOR SOLUTION INTRAMUSCULAR; INTRAVENOUS at 22:53

## 2019-04-15 RX ADMIN — Medication 0.5 MG: at 23:33

## 2019-04-15 ASSESSMENT — MIFFLIN-ST. JEOR: SCORE: 1730.94

## 2019-04-15 NOTE — LETTER
April 15, 2019      To Whom It May Concern:      Eliana Taryn Bishnu Anders was seen in our Emergency Department today, 04/15/19.  I expect her condition to improve over the next few days.  She may return to work/school on 4/17/19.    Sincerely,        Beni Hastings MD, MD

## 2019-04-15 NOTE — ED AVS SNAPSHOT
[facility] [place], Emergency Department  [address]  [facility] [address]  Phone:  [phone]  Fax:  [phone]                                    Eliana Anders   MRN: [id number]    Department:  [facility], Emergency Department   Date of Visit:  4/15/2019           After Visit Summary Signature Page    I have received my discharge instructions, and my questions have been answered. I have discussed any challenges I see with this plan with the nurse or doctor.    ..........................................................................................................................................  Patient/Patient Representative Signature      ..........................................................................................................................................  Patient Representative Print Name and Relationship to Patient    ..................................................               ................................................  Date                                   Time    ..........................................................................................................................................  Reviewed by Signature/Title    ...................................................              ..............................................  Date                                               Time          22EPIC Rev 08/18

## 2019-04-15 NOTE — LETTER
April 16, 2019      To Whom It May Concern:      Eliana Tarynledy Anders was seen in our Emergency Department today, 04/16/19.  I expect her condition to improve over the next few days.  She may return to work/school on Thursday 04/18/19.     Sincerely,        Consuelo Hastings MD

## 2019-04-16 NOTE — DISCHARGE INSTRUCTIONS
Do sitz baths for approximately 30 minutes 3 times daily over the next 3 days.    Please make an appointment to follow up with Your Primary Care Provider in one week for recheck.    Consider making an appointment with [facility] (phone: [phone]) for evaluation of marsupialization for recurrent cysts.
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
## 2019-04-16 NOTE — ED TRIAGE NOTES
Left groin swelling from groin area down to labia and perivaginal area. Swelling started last week. Today pain and swelling is so bad patient cannot function.

## 2019-08-06 ENCOUNTER — TELEPHONE (OUTPATIENT)
Dept: OBGYN | Facility: CLINIC | Age: 39
End: 2019-08-06

## 2019-08-06 ENCOUNTER — OFFICE VISIT (OUTPATIENT)
Dept: OBGYN | Facility: CLINIC | Age: 39
End: 2019-08-06
Payer: COMMERCIAL

## 2019-08-06 VITALS
TEMPERATURE: 98.1 F | DIASTOLIC BLOOD PRESSURE: 92 MMHG | BODY MASS INDEX: 33.36 KG/M2 | WEIGHT: 213 LBS | HEART RATE: 105 BPM | SYSTOLIC BLOOD PRESSURE: 142 MMHG

## 2019-08-06 DIAGNOSIS — Z01.818 PRE-OP EXAM: Primary | ICD-10-CM

## 2019-08-06 DIAGNOSIS — Z01.818 PRE-OP EXAM: ICD-10-CM

## 2019-08-06 DIAGNOSIS — N75.1 ABSCESS OF BARTHOLIN'S GLAND: ICD-10-CM

## 2019-08-06 DIAGNOSIS — N75.1 ABSCESS OF BARTHOLIN'S GLAND: Primary | ICD-10-CM

## 2019-08-06 LAB
ABO + RH BLD: NORMAL
ABO + RH BLD: NORMAL
BLD GP AB SCN SERPL QL: NORMAL
BLOOD BANK CMNT PATIENT-IMP: NORMAL
ERYTHROCYTE [DISTWIDTH] IN BLOOD BY AUTOMATED COUNT: 14.9 % (ref 10–15)
HCT VFR BLD AUTO: 42.8 % (ref 35–47)
HGB BLD-MCNC: 14.5 G/DL (ref 11.7–15.7)
MCH RBC QN AUTO: 32.7 PG (ref 26.5–33)
MCHC RBC AUTO-ENTMCNC: 33.9 G/DL (ref 31.5–36.5)
MCV RBC AUTO: 96 FL (ref 78–100)
PLATELET # BLD AUTO: 265 10E9/L (ref 150–450)
RBC # BLD AUTO: 4.44 10E12/L (ref 3.8–5.2)
SPECIMEN EXP DATE BLD: NORMAL
WBC # BLD AUTO: 7.3 10E9/L (ref 4–11)

## 2019-08-06 PROCEDURE — 85027 COMPLETE CBC AUTOMATED: CPT | Performed by: OBSTETRICS & GYNECOLOGY

## 2019-08-06 PROCEDURE — 99213 OFFICE O/P EST LOW 20 MIN: CPT | Performed by: OBSTETRICS & GYNECOLOGY

## 2019-08-06 PROCEDURE — 86850 RBC ANTIBODY SCREEN: CPT | Performed by: OBSTETRICS & GYNECOLOGY

## 2019-08-06 PROCEDURE — 87070 CULTURE OTHR SPECIMN AEROBIC: CPT | Performed by: OBSTETRICS & GYNECOLOGY

## 2019-08-06 PROCEDURE — 86900 BLOOD TYPING SEROLOGIC ABO: CPT | Performed by: OBSTETRICS & GYNECOLOGY

## 2019-08-06 PROCEDURE — 36415 COLL VENOUS BLD VENIPUNCTURE: CPT | Performed by: OBSTETRICS & GYNECOLOGY

## 2019-08-06 PROCEDURE — 86901 BLOOD TYPING SEROLOGIC RH(D): CPT | Performed by: OBSTETRICS & GYNECOLOGY

## 2019-08-06 RX ORDER — OXYCODONE HYDROCHLORIDE 5 MG/1
5 TABLET ORAL EVERY 6 HOURS PRN
Qty: 12 TABLET | Refills: 0 | Status: ON HOLD | OUTPATIENT
Start: 2019-08-06 | End: 2019-08-12

## 2019-08-06 RX ORDER — LIDOCAINE HYDROCHLORIDE 20 MG/ML
JELLY TOPICAL PRN
Qty: 30 ML | Refills: 1 | Status: SHIPPED | OUTPATIENT
Start: 2019-08-06 | End: 2020-02-27

## 2019-08-06 RX ORDER — OXYCODONE HYDROCHLORIDE 5 MG/1
5 TABLET ORAL EVERY 6 HOURS PRN
Qty: 12 TABLET | Refills: 0 | Status: SHIPPED | OUTPATIENT
Start: 2019-08-06 | End: 2019-08-06

## 2019-08-06 RX ORDER — SULFAMETHOXAZOLE/TRIMETHOPRIM 800-160 MG
1 TABLET ORAL 2 TIMES DAILY
Qty: 14 TABLET | Refills: 0 | Status: SHIPPED | OUTPATIENT
Start: 2019-08-06 | End: 2019-12-16

## 2019-08-06 NOTE — LETTER
[facility]  [address] [facility]  [address]  [place] [address]  Phone: [phone]  Fax: [phone]    August 6, 2019        Eliana Anders  [address]    [place] [address]          To whom it may concern:    RE: Eliana Goetz Ch[place]    Eliana Anders was seen in clinic 8/6/2019 for a medical issue. Please excuse her from work 8/5, 8/6 and 8/7 due to this medical issue.       Please contact me for questions or concerns.      Sincerely,            Amara Thakur MD

## 2019-08-06 NOTE — TELEPHONE ENCOUNTER
Type of surgery: gyn  Location of surgery: [facility]  Date and time of surgery: 8/8/19 120p  Surgeon: Ofe  Pre-Op Appt Date: done  Post-Op Appt Date: tbd   Packet sent out: Yes  Pre-cert/Authorization completed:  No  Date: 8/6/2019    Rosanna HYATT, Surgery Coordinator

## 2019-08-06 NOTE — NURSING NOTE
"Chief Complaint   Patient presents with     Cyst       Initial BP (!) 142/92 (BP Location: Left arm, Patient Position: Sitting, Cuff Size: Adult Regular)   Pulse 105   Temp 98.1  F (36.7  C) (Oral)   Wt 96.6 kg (213 lb)   BMI 33.36 kg/m   Estimated body mass index is 33.36 kg/m  as calculated from the following:    Height as of 4/15/19: 1.702 m (5' 7\").    Weight as of this encounter: 96.6 kg (213 lb).  BP completed using cuff size: regular    Questioned patient about current smoking habits.  Pt. quit smoking some time ago.          The following HM Due: NONE      The following patient reported/Care Every where data was sent to:  P ABSTRACT QUALITY INITIATIVES [[id number]]  JAILENE Larson           "

## 2019-08-06 NOTE — LETTER
[facility]  [address] [facility] [facility]  [address]  [facility] [address]  Phone: [phone]  Fax: [phone]    August 6, 2019        Eliana Anders  [address]    [place] [facility] [address]          To whom it may concern:    RE: Eliana Goetz [place] Anders has a medical procedure scheduled 8/8/19. Please excuse her from work on 8/8/19 starting at 9am, all day 8/9 and 8/12/19 for the procedure and recovery after the procedure.       Please contact me for questions or concerns.      Sincerely,            Amara Thakur MD

## 2019-08-06 NOTE — TELEPHONE ENCOUNTER
Patient calling regarding cyst on vulva area. Pt states that she has history of Bartholin cyst. This one is partially open. Very painful. Needs it drained. Has missed work the last 2 days. Routing to SL. Would you be able to see her this afternoon in clinic? Or do you want her to go to ER. Pt very tearful on the phone for the pain she is having. Thanks.   Svetlana Becerra, RN-BSN
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
## 2019-08-08 ENCOUNTER — HOSPITAL ENCOUNTER (OUTPATIENT)
Facility: CLINIC | Age: 39
Discharge: HOME | End: 2019-08-08
Attending: OBSTETRICS & GYNECOLOGY | Admitting: OBSTETRICS & GYNECOLOGY
Payer: COMMERCIAL

## 2019-08-08 ENCOUNTER — ANESTHESIA (OUTPATIENT)
Dept: SURGERY | Facility: CLINIC | Age: 39
End: 2019-08-08
Payer: COMMERCIAL

## 2019-08-08 ENCOUNTER — ANESTHESIA EVENT (OUTPATIENT)
Dept: SURGERY | Facility: CLINIC | Age: 39
End: 2019-08-08
Payer: COMMERCIAL

## 2019-08-08 VITALS
WEIGHT: 211.64 LBS | DIASTOLIC BLOOD PRESSURE: 84 MMHG | OXYGEN SATURATION: 94 % | TEMPERATURE: 97.9 F | SYSTOLIC BLOOD PRESSURE: 104 MMHG | HEART RATE: 84 BPM | HEIGHT: 67 IN | RESPIRATION RATE: 20 BRPM | BODY MASS INDEX: 33.22 KG/M2

## 2019-08-08 DIAGNOSIS — N75.1 ABSCESS OF BARTHOLIN'S GLAND: Primary | ICD-10-CM

## 2019-08-08 LAB
GLUCOSE BLDC GLUCOMTR-MCNC: 94 MG/DL (ref 70–99)
HCG UR QL: NEGATIVE

## 2019-08-08 PROCEDURE — 25000125 ZZHC RX 250: Performed by: NURSE ANESTHETIST, CERTIFIED REGISTERED

## 2019-08-08 PROCEDURE — 25000125 ZZHC RX 250: Performed by: ANESTHESIOLOGY

## 2019-08-08 PROCEDURE — 25000128 H RX IP 250 OP 636: Performed by: NURSE ANESTHETIST, CERTIFIED REGISTERED

## 2019-08-08 PROCEDURE — 56440 MRSPLZATN BRTHLNS GLND CST: CPT | Mod: GC | Performed by: OBSTETRICS & GYNECOLOGY

## 2019-08-08 PROCEDURE — 37000009 ZZH ANESTHESIA TECHNICAL FEE, EACH ADDTL 15 MIN: Performed by: OBSTETRICS & GYNECOLOGY

## 2019-08-08 PROCEDURE — 37000008 ZZH ANESTHESIA TECHNICAL FEE, 1ST 30 MIN: Performed by: OBSTETRICS & GYNECOLOGY

## 2019-08-08 PROCEDURE — 82962 GLUCOSE BLOOD TEST: CPT

## 2019-08-08 PROCEDURE — 27210794 ZZH OR GENERAL SUPPLY STERILE: Performed by: OBSTETRICS & GYNECOLOGY

## 2019-08-08 PROCEDURE — 25000132 ZZH RX MED GY IP 250 OP 250 PS 637: Performed by: OBSTETRICS & GYNECOLOGY

## 2019-08-08 PROCEDURE — 25000125 ZZHC RX 250: Performed by: OBSTETRICS & GYNECOLOGY

## 2019-08-08 PROCEDURE — 40000170 ZZH STATISTIC PRE-PROCEDURE ASSESSMENT II: Performed by: OBSTETRICS & GYNECOLOGY

## 2019-08-08 PROCEDURE — 71000027 ZZH RECOVERY PHASE 2 EACH 15 MINS: Performed by: OBSTETRICS & GYNECOLOGY

## 2019-08-08 PROCEDURE — 81025 URINE PREGNANCY TEST: CPT | Performed by: ANESTHESIOLOGY

## 2019-08-08 PROCEDURE — 36000051 ZZH SURGERY LEVEL 2 1ST 30 MIN - UMMC: Performed by: OBSTETRICS & GYNECOLOGY

## 2019-08-08 PROCEDURE — 36000053 ZZH SURGERY LEVEL 2 EA 15 ADDTL MIN - UMMC: Performed by: OBSTETRICS & GYNECOLOGY

## 2019-08-08 PROCEDURE — 25000128 H RX IP 250 OP 636: Performed by: OBSTETRICS & GYNECOLOGY

## 2019-08-08 PROCEDURE — 25800030 ZZH RX IP 258 OP 636: Performed by: NURSE ANESTHETIST, CERTIFIED REGISTERED

## 2019-08-08 RX ORDER — OXYCODONE HYDROCHLORIDE 5 MG/1
5 TABLET ORAL EVERY 6 HOURS PRN
Qty: 6 TABLET | Refills: 0 | Status: SHIPPED | OUTPATIENT
Start: 2019-08-08 | End: 2019-08-08

## 2019-08-08 RX ORDER — KETOROLAC TROMETHAMINE 30 MG/ML
INJECTION, SOLUTION INTRAMUSCULAR; INTRAVENOUS PRN
Status: DISCONTINUED | OUTPATIENT
Start: 2019-08-08 | End: 2019-08-08

## 2019-08-08 RX ORDER — CEFAZOLIN SODIUM 1 G/3ML
1 INJECTION, POWDER, FOR SOLUTION INTRAMUSCULAR; INTRAVENOUS SEE ADMIN INSTRUCTIONS
Status: DISCONTINUED | OUTPATIENT
Start: 2019-08-08 | End: 2019-08-08 | Stop reason: HOSPADM

## 2019-08-08 RX ORDER — NALOXONE HYDROCHLORIDE 0.4 MG/ML
.1-.4 INJECTION, SOLUTION INTRAMUSCULAR; INTRAVENOUS; SUBCUTANEOUS
Status: DISCONTINUED | OUTPATIENT
Start: 2019-08-08 | End: 2019-08-09 | Stop reason: HOSPADM

## 2019-08-08 RX ORDER — SODIUM CHLORIDE, SODIUM LACTATE, POTASSIUM CHLORIDE, CALCIUM CHLORIDE 600; 310; 30; 20 MG/100ML; MG/100ML; MG/100ML; MG/100ML
INJECTION, SOLUTION INTRAVENOUS CONTINUOUS
Status: DISCONTINUED | OUTPATIENT
Start: 2019-08-08 | End: 2019-08-13 | Stop reason: HOSPADM

## 2019-08-08 RX ORDER — OXYCODONE HYDROCHLORIDE 5 MG/1
5 TABLET ORAL EVERY 6 HOURS PRN
Status: DISCONTINUED | OUTPATIENT
Start: 2019-08-08 | End: 2019-08-13 | Stop reason: HOSPADM

## 2019-08-08 RX ORDER — FENTANYL CITRATE 50 UG/ML
INJECTION, SOLUTION INTRAMUSCULAR; INTRAVENOUS PRN
Status: DISCONTINUED | OUTPATIENT
Start: 2019-08-08 | End: 2019-08-08

## 2019-08-08 RX ORDER — PROPOFOL 10 MG/ML
INJECTION, EMULSION INTRAVENOUS CONTINUOUS PRN
Status: DISCONTINUED | OUTPATIENT
Start: 2019-08-08 | End: 2019-08-08

## 2019-08-08 RX ORDER — OXYCODONE HYDROCHLORIDE 5 MG/1
5 TABLET ORAL EVERY 6 HOURS PRN
Qty: 6 TABLET | Refills: 0 | Status: SHIPPED | OUTPATIENT
Start: 2019-08-08 | End: 2019-08-31

## 2019-08-08 RX ORDER — LIDOCAINE HYDROCHLORIDE AND EPINEPHRINE 10; 10 MG/ML; UG/ML
INJECTION, SOLUTION INFILTRATION; PERINEURAL PRN
Status: DISCONTINUED | OUTPATIENT
Start: 2019-08-08 | End: 2019-08-08 | Stop reason: HOSPADM

## 2019-08-08 RX ORDER — SODIUM CHLORIDE, SODIUM LACTATE, POTASSIUM CHLORIDE, CALCIUM CHLORIDE 600; 310; 30; 20 MG/100ML; MG/100ML; MG/100ML; MG/100ML
INJECTION, SOLUTION INTRAVENOUS CONTINUOUS PRN
Status: DISCONTINUED | OUTPATIENT
Start: 2019-08-08 | End: 2019-08-08

## 2019-08-08 RX ORDER — OXYCODONE AND ACETAMINOPHEN 5; 325 MG/1; MG/1
1 TABLET ORAL
Status: DISCONTINUED | OUTPATIENT
Start: 2019-08-08 | End: 2019-08-13 | Stop reason: HOSPADM

## 2019-08-08 RX ORDER — CEFAZOLIN SODIUM 2 G/100ML
2 INJECTION, SOLUTION INTRAVENOUS
Status: COMPLETED | OUTPATIENT
Start: 2019-08-08 | End: 2019-08-08

## 2019-08-08 RX ORDER — MEPERIDINE HYDROCHLORIDE 25 MG/ML
12.5 INJECTION INTRAMUSCULAR; INTRAVENOUS; SUBCUTANEOUS
Status: DISCONTINUED | OUTPATIENT
Start: 2019-08-08 | End: 2019-08-13 | Stop reason: HOSPADM

## 2019-08-08 RX ORDER — FENTANYL CITRATE 50 UG/ML
25-50 INJECTION, SOLUTION INTRAMUSCULAR; INTRAVENOUS
Status: DISCONTINUED | OUTPATIENT
Start: 2019-08-08 | End: 2019-08-13 | Stop reason: HOSPADM

## 2019-08-08 RX ORDER — ONDANSETRON 2 MG/ML
INJECTION INTRAMUSCULAR; INTRAVENOUS PRN
Status: DISCONTINUED | OUTPATIENT
Start: 2019-08-08 | End: 2019-08-08

## 2019-08-08 RX ORDER — LIDOCAINE HYDROCHLORIDE 20 MG/ML
INJECTION, SOLUTION INFILTRATION; PERINEURAL PRN
Status: DISCONTINUED | OUTPATIENT
Start: 2019-08-08 | End: 2019-08-08

## 2019-08-08 RX ORDER — ONDANSETRON 4 MG/1
4 TABLET, ORALLY DISINTEGRATING ORAL EVERY 30 MIN PRN
Status: DISCONTINUED | OUTPATIENT
Start: 2019-08-08 | End: 2019-08-13 | Stop reason: HOSPADM

## 2019-08-08 RX ORDER — ONDANSETRON 2 MG/ML
4 INJECTION INTRAMUSCULAR; INTRAVENOUS EVERY 30 MIN PRN
Status: DISCONTINUED | OUTPATIENT
Start: 2019-08-08 | End: 2019-08-13 | Stop reason: HOSPADM

## 2019-08-08 RX ORDER — SCOLOPAMINE TRANSDERMAL SYSTEM 1 MG/1
1 PATCH, EXTENDED RELEASE TRANSDERMAL ONCE
Status: COMPLETED | OUTPATIENT
Start: 2019-08-08 | End: 2019-08-08

## 2019-08-08 RX ORDER — PROPOFOL 10 MG/ML
INJECTION, EMULSION INTRAVENOUS PRN
Status: DISCONTINUED | OUTPATIENT
Start: 2019-08-08 | End: 2019-08-08

## 2019-08-08 RX ADMIN — OXYCODONE HYDROCHLORIDE 5 MG: 5 TABLET ORAL at 15:45

## 2019-08-08 RX ADMIN — MIDAZOLAM 2 MG: 1 INJECTION INTRAMUSCULAR; INTRAVENOUS at 13:44

## 2019-08-08 RX ADMIN — LIDOCAINE HYDROCHLORIDE 40 MG: 20 INJECTION, SOLUTION INFILTRATION; PERINEURAL at 13:54

## 2019-08-08 RX ADMIN — SODIUM CHLORIDE, POTASSIUM CHLORIDE, SODIUM LACTATE AND CALCIUM CHLORIDE: 600; 310; 30; 20 INJECTION, SOLUTION INTRAVENOUS at 13:44

## 2019-08-08 RX ADMIN — FENTANYL CITRATE 50 MCG: 50 INJECTION, SOLUTION INTRAMUSCULAR; INTRAVENOUS at 14:12

## 2019-08-08 RX ADMIN — MIDAZOLAM 1 MG: 1 INJECTION INTRAMUSCULAR; INTRAVENOUS at 13:59

## 2019-08-08 RX ADMIN — SCOPALAMINE 1 PATCH: 1 PATCH, EXTENDED RELEASE TRANSDERMAL at 12:48

## 2019-08-08 RX ADMIN — FENTANYL CITRATE 50 MCG: 50 INJECTION, SOLUTION INTRAMUSCULAR; INTRAVENOUS at 14:04

## 2019-08-08 RX ADMIN — PROPOFOL 40 MG: 10 INJECTION, EMULSION INTRAVENOUS at 13:54

## 2019-08-08 RX ADMIN — ONDANSETRON 4 MG: 2 INJECTION INTRAMUSCULAR; INTRAVENOUS at 14:51

## 2019-08-08 RX ADMIN — KETOROLAC TROMETHAMINE 30 MG: 30 INJECTION, SOLUTION INTRAMUSCULAR at 14:51

## 2019-08-08 RX ADMIN — MIDAZOLAM 1 MG: 1 INJECTION INTRAMUSCULAR; INTRAVENOUS at 14:42

## 2019-08-08 RX ADMIN — PROPOFOL 200 MCG/KG/MIN: 10 INJECTION, EMULSION INTRAVENOUS at 13:56

## 2019-08-08 RX ADMIN — LIDOCAINE HYDROCHLORIDE 60 MG: 20 INJECTION, SOLUTION INFILTRATION; PERINEURAL at 13:51

## 2019-08-08 RX ADMIN — CEFAZOLIN SODIUM 2 G: 2 INJECTION, SOLUTION INTRAVENOUS at 13:50

## 2019-08-08 ASSESSMENT — LIFESTYLE VARIABLES: TOBACCO_USE: 1

## 2019-08-08 ASSESSMENT — MIFFLIN-ST. JEOR: SCORE: 1667.63

## 2019-08-08 NOTE — BRIEF OP NOTE
OBGYN Operative Note  [facility]    Patient: Eliana Anders   : 1980   MRN: [id number]     Date of Service: 19     Pre-operative diagnosis:  1. Bilateral bartholin gland abscesses    Post-operative diagnosis:  1. Same, s/p procedure    Procedure:   1. Exam under anesthesia  2. Bilateral marsupialization of bartholin glands    Surgeon: Amara Thakur MD  Assistants: Kimberly Jones PGY-4    Anesthesia: Local with MAC    EBL: 50 mL  Urine: 100 mL  Fluids: 600 mL cystalloid  Specimens:  none  Complications: none    Findings: on external exam, normal female genitalia with the exception of enlarged bartholin glands bilaterally, roughly 2cm each. Whitish fluid and more inflammation on the left side at the site of previous abscess. Right side with yellowish fluid and  edges. Hemostasis at end of case.      Kimberly Jones MD PGY4  Department of OB/GYN  2019 2:55 PM

## 2019-08-08 NOTE — ANESTHESIA POSTPROCEDURE EVALUATION
Anesthesia POST Procedure Evaluation    Patient: Eliana Anders   MRN:     [id number] Gender:   female   Age:    39 year old :      1980        Preoperative Diagnosis: Recurrent Bartholin Gland Cyst Left   Procedure(s):  Marsupialization Of Bilateral Bartholin Gland Cysts   Postop Comments: No value filed.       Anesthesia Type:  Not documented  General    Reportable Event: NO     PAIN: Uncomplicated   Sign Out status: Comfortable, Well controlled pain     PONV: No PONV   Sign Out status:  No Nausea or Vomiting     Neuro/Psych: Uneventful perioperative course   Sign Out Status: Preoperative baseline; Age appropriate mentation     Airway/Resp.: Uneventful perioperative course   Sign Out Status: Non labored breathing, age appropriate RR; Resp. Status within EXPECTED Parameters     CV: Uneventful perioperative course   Sign Out status: Appropriate BP and perfusion indices; Appropriate HR/Rhythm     Disposition:   Sign Out in:  PACU  Disposition:  Phase II; Home  Recovery Course: Uneventful  Follow-Up: Not required           Last Anesthesia Record Vitals:  CRNA VITALS  2019 1426 - 2019 1457      2019             Resp Rate (observed):  11          Last PACU Vitals:  Vitals Value Taken Time   BP     Temp     Pulse     Resp     SpO2     Temp src     NIBP 102/56 2019  2:48 PM   Pulse 97 2019  2:53 PM   SpO2 95 % 2019  2:53 PM   Resp     Temp     Ht Rate 86 2019  2:51 PM   Temp 2           Electronically Signed By: Marilee Silva MD, 2019, 2:57 PM
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
## 2019-08-08 NOTE — OP NOTE
OBGYN Operative Note  [facility]     Patient: Eliana Anders   : 1980   MRN: [id number]      Date of Service: 19      Pre-operative diagnosis:  1. Recurrent ilateral bartholin gland abscesses     Post-operative diagnosis:  1. Same, s/p procedure     Procedure:   1. Exam under anesthesia  2. Bilateral marsupialization of bartholin glands     Surgeon: Amara Thakur MD  Assistants: Kimberly Jones PGY-4     Anesthesia: Local with MAC     EBL: 50 mL  Urine: 100 mL  Fluids: 600 mL cystalloid  Specimens:  none  Complications: none     Findings: on external exam, normal female genitalia with the exception of enlarged bartholin glands bilaterally, roughly 2cm each. Whitish fluid and more inflammation on the left side at the site of previous abscess. Right side with yellowish fluid and  edges. Hemostasis at end of case.      Indications:   Eliana Anders is a 39 year old  female who presents with a recurrent left Bartholin's gland abscess. She had been dealing with recurrent bartholin gland abscesses intermittently for 6 years. She has had multiple word catheters and wanted surgery to help fix the underlying problem. The day of surgery she noticed that her right bartholin gland was also swollen and tender. She was counseled on the risks, benefits and alternatives and signed consent.     Procedure:  The patient was taken to the operating room where she was positioned and MAC was administered. She was prepped and draped and a time out was performed. The bilateral abscesses were injected with 20cc of 1%  lidocaine with epi. A 2cm incision was made just distal to the hymen on the left side over the abscess, near where it had started naturally draining once the lidocaine was injected. White fluid came out and the cyst pocket extended almost 4cm. The cyst wall was then sutured to the healthy epithelial edges along the circumference of the incision. This was  done using 4-0 vicryl with interrupted sutures. Attention was then turned to the patient's right side and the same procedure was done. This cyst was smaller, extending only 2cm deep, and had more yellowish pus/fluid within it. The cysts were intermittently irrigated throughout the procedure. At the end of the procedure, the left side was bleeding, so cautery was used as well as a figure of 8 stitch in the superior edge of the cyst wall, roughly 1cm deep. A straight cath was then used to empty the patient's bladder and clear urine was noted. The patient tolerated the procedure well and was taken to the PACU in stable condition.     Dr. Thakur was present and scrubbed for the entire procedure.     Kimberly Jones MD PGY4  Department of OB/GYN  8/8/2019 2:55 PM        ATTESTATION:   I was scrubbed and present for the entire procedure. I agree with the above note which I have edited to reflect my findings.   Amara Thakur MD

## 2019-08-08 NOTE — ANESTHESIA CARE TRANSFER NOTE
Patient: Eliana Anders    Procedure(s):  Marsupialization Of Bilateral Bartholin Gland Cysts    Diagnosis: Recurrent Bartholin Gland Cyst Left  Diagnosis Additional Information: No value filed.    Anesthesia Type:   General     Note:  Airway :Room Air  Patient transferred to:PACU  Handoff Report: Identifed the Patient, Identified the Reponsible Provider, Reviewed the pertinent medical history, Discussed the surgical course, Reviewed Intra-OP anesthesia mangement and issues during anesthesia, Set expectations for post-procedure period and Allowed opportunity for questions and acknowledgement of understanding      Vitals: (Last set prior to Anesthesia Care Transfer)    CRNA VITALS  8/8/2019 1426 - 8/8/2019 1507      8/8/2019             Resp Rate (observed):  14    EKG:  Sinus rhythm                Electronically Signed By: Juanita Sharif CRNA, CARISA BAUTISTA  August 8, 2019  3:07 PM

## 2019-08-08 NOTE — DISCHARGE INSTRUCTIONS
[facility]   Adult Discharge Orders & Instructions     For 24 hours after surgery    1. Get plenty of rest.  A responsible adult must stay with you for at least 24 hours after you leave the hospital.   2. Do not drive or use heavy equipment.  If you have weakness or tingling, don't drive or use heavy equipment until this feeling goes away.  3. Do not drink alcohol.  4. Avoid strenuous or risky activities.  Ask for help when climbing stairs.   5. You may feel lightheaded.  IF so, sit for a few minutes before standing.  Have someone help you get up.   6. If you have nausea (feel sick to your stomach): Drink only clear liquids such as apple juice, ginger ale, broth or 7-Up.  Rest may also help.  Be sure to drink enough fluids.  Move to a regular diet as you feel able.  7. You may have a slight fever. Call the doctor if your fever is over 100 F (37.7 C) (taken under the tongue) or lasts longer than 24 hours.  8. You may have a dry mouth, a sore throat, muscle aches or trouble sleeping.  These should go away after 24 hours.  9. Do not make important or legal decisions.   Call your doctor for any of the followin.  Signs of infection (fever, growing tenderness at the surgery site, a large amount of drainage or bleeding, severe pain, foul-smelling drainage, redness, swelling).    2. It has been over 8 to 10 hours since surgery and you are still not able to urinate (pass water).    3.  Headache for over 24 hours.    4.  Numbness, tingling or weakness the day after surgery (if you had spinal anesthesia).  To contact a doctor, call ________Ofe Bates @ ___[phone] or after hours call [phone] and ask for OB/Gyn resident ON Call         _Scopolamine Patch  (Absorbed through the skin)    The Scopolamine Patch prevents nausea and vomiting caused by motion sickness or anesthesia and surgery in adults.    Brand Name(s): Transderm Scop, Transderm-Scope  There may be other brand names for this  medicine.    When This Medicine Should Not Be Used:  You should not use this medicine if you have had an allergic reaction to scopolamine, or if you have narrow angle glaucoma.    How to Use This Medicine:    Your doctor will tell you how many patches to use, where to apply them, and how often to apply them.     Do not use more patches or apply them more often than your doctor tells you to.    This medicine comes with patient instructions. Read and follow these instructions carefully. Ask your doctor or pharmacist if you have any questions.    To prevent motion sickness, apply the patch at least 4 hours before you need it.    Wash and dry your hands thoroughly before applying the patch.    Leave the patch in its sealed wrapper until you are ready to put it on. Tear the wrapper open carefully. NEVER CUT the wrapper or the patch with scissors. Do not use any patch that has been cut by accident.    Take the liner off the sticky side before applying.    Apply the patch to dry, hairless skin behind the ear.    If the patch is loose or falls off, apply a new patch at a different place behind the ear.    After you take off the patch, wash the place where the patch was and your hands thoroughly.    Only one patch should be used at any time.    If a dose is missed:  If you forget to wear or change a patch, put one on as soon as you can. If it is almost time to put on your next patch, wait until then to apply a new patch and skip the one you missed. Do not apply extra patches to make up for a missed dose.    How to Store and Dispose of This Medicine:    Store the patches at room temperature in a closed container, away from heat, moisture and direct light.    Fold the used patch in half with the sticky sides together. Throw any used patch away so that children or pets cannot get to it. You will also need to throw away old patches after the expiration date has passed.    Keep all medicine away from children and never share your  medicine with anyone.    Ask your doctor or pharmacist before using any other medicine, including over-the-counter medicines, vitamins, and herbal products.  Tell your doctor if you are using any medicines that make you sleepy. These include sleeping pills, cold and allergy medicine, narcotic pain relievers and sedatives.     Tell your doctor if you are using any medicine that make you sleepy. These include sleeping pills, enrique and allergy medicine, narcotic pain relievers and sedatives.    Do not drink alcohol while you are using this medicine.     Warnings While Using This Medicine:    Make sure your doctor knows if you are pregnant or breastfeeding or if you have glaucoma, prostate problems, trouble urinating, blocked bowels, liver disease, kidney disease or a history of seizures or mental illness.    This medicine can cause blurring of vision and other vision problems if it comes in contact with the eyes. This medicine may also cause problems with urination. If any of these reactions occur, remove the patch and call your doctor right away.    This medicine may make you dizzy or drowsy. Avoid driving, using machines, or doing anything else that could be dangerous if you are not alert. If you plan to participate in underwater sports, this medicine may cause disorienting effects. If this is a concern for you, talk with your doctor.    This medicine may make you sweat less and cause your body to get too hot. Be careful in hot weather, when you are exercising or if using sauna or whirlpool.    Make sure any doctor or dentist who treats you knows that you are using this medicine. This medicine may affect the results of certain medical tests.    Skin burns have been reported at the patch site in several patients wearing an aluminized transdermal system during a magnetic resonance imaging scan (MRI). Because Transderm Scop contains aluminum, it is recommended to remove the system before undergoing an MRI.    Call your  doctor right away if you notice any of these side effects:    Allergic reaction: Itching or hives, swelling in your face or hands, swelling or tingling in your mouth or throat, chest tightness, trouble breathing    Blurred vision    Confusion or memory loss    Fast, slow or uneven heartbeat    Lightheadedness, dizziness, drowsiness or fainting    Seeing, hearing or feeling things that are not there    Severe eye pain    Trouble urinating    If you notice these less serious side effects, talk with your doctor:    Dry mouth    Dry, itchy or red eyes    Restlessness    Skin rash or redness    If you notice other side effects that you think are caused by this medicine, tell your doctor immediately.    Rev. 4/2014

## 2019-08-09 LAB
BACTERIA SPEC CULT: NORMAL
Lab: NORMAL
SPECIMEN SOURCE: NORMAL

## 2019-08-11 ENCOUNTER — NURSE TRIAGE (OUTPATIENT)
Dept: NURSING | Facility: CLINIC | Age: 39
End: 2019-08-11

## 2019-08-11 ENCOUNTER — TELEPHONE (OUTPATIENT)
Dept: OBGYN | Facility: CLINIC | Age: 39
End: 2019-08-11

## 2019-08-11 DIAGNOSIS — N75.1 ABSCESS OF BARTHOLIN'S GLAND: Primary | ICD-10-CM

## 2019-08-11 NOTE — TELEPHONE ENCOUNTER
"Eliana had surgery Friday for bilateral Bartholin's cysts.    Her pharmacy told her they cannot fill her prescription for oxycodone because it was stamped \"FAX\".    Pharmacy advised her to call clinic to obtain new prescription.    Notified her that since it is a controlled substance, [facility] cannot order and on-call provider will not order.    She is having a great deal of pain. She rates it 7/10. She is using ice packs as instructed and a numbing gel.    She is still experiencing some bleeding. It has improved significantly and is now spotting.    Notified that bleeding sounds like it is in a trend of improvement. Concern would be if it wasn't tapering off, if it was increasing, if there was a change such as foul order, increased pain or fever.    She wants to know how long to expect to have bleeding.    She requests message sent to clinic.  Advised her also to call clinic after 8:00 am.    Franny Car RN  [facility]      Reason for Disposition    Caller has URGENT medication question about med that PCP prescribed and triager unable to answer question    Protocols used: MEDICATION QUESTION CALL-A-AH      "

## 2019-08-11 NOTE — TELEPHONE ENCOUNTER
"Clinic Action Needed: *YES* Please contact patient.    [facility] Triage Call  Presenting Problem:  Eliana had surgery Friday for bilateral Bartholin's cysts.    Her pharmacy told her they cannot fill her prescription for oxycodone because it was stamped \"FAX\".    Pharmacy advised her to call clinic to obtain new prescription.    Notified her that since it is a controlled substance, [facility]NA cannot order and on-call provider will not order.    She is having a great deal of pain. She rates it 7/10. She is using ice packs as instructed and a numbing gel.    She is still experiencing some bleeding. It has improved significantly and is now spotting.    Notified that bleeding sounds like it is in a trend of improvement. Concern would be if it wasn't tapering off, if it was increasing, if there was a change such as foul order, increased pain or fever.    She wants to know how long to expect to have bleeding.    She requests message sent to clinic.  Advised her also to call clinic after 8:00 am.    Franny Car RN  [facility]    Routed to: P [id number]      "

## 2019-08-12 RX ORDER — OXYCODONE HYDROCHLORIDE 5 MG/1
5 TABLET ORAL EVERY 6 HOURS PRN
Qty: 10 TABLET | Refills: 0 | Status: SHIPPED | OUTPATIENT
Start: 2019-08-12 | End: 2019-12-16

## 2019-08-12 RX ORDER — OXYCODONE HYDROCHLORIDE 5 MG/1
5 TABLET ORAL EVERY 6 HOURS PRN
Qty: 6 TABLET | Refills: 0 | Status: CANCELLED | OUTPATIENT
Start: 2019-08-12 | End: 2019-08-15

## 2019-08-12 NOTE — TELEPHONE ENCOUNTER
Confirmed that pt is doing tub soaks, encouraged her to continue. She is aware of rx at  for .  Yanci iMchelle RN

## 2019-08-12 NOTE — TELEPHONE ENCOUNTER
Can you please do a new oxycodone prescription for the patient?  She said the pharmacy would not accept it.  Gaetano Manas [birth year] will come to the clinic to get a new script.  Yanci Michelle RN

## 2019-08-13 ENCOUNTER — TELEPHONE (OUTPATIENT)
Dept: OBGYN | Facility: CLINIC | Age: 39
End: 2019-08-13

## 2019-08-13 NOTE — TELEPHONE ENCOUNTER
Eliana Anders is a 39 year old who underwent marsupialization of bilateral Bartholin's abscesses. I called patient to follow up to see how she is feeling after the procedure.    Back to work today so she is sore but overall feeling better. Minimal spotting. A little itchy. Sitz baths and pain meds help. Has not taken any pain meds today.     Patient has follow up next week, encouraged her to call if any issues before then.    Amara Thakur MD

## 2019-08-16 DIAGNOSIS — I10 BENIGN ESSENTIAL HYPERTENSION: ICD-10-CM

## 2019-08-16 RX ORDER — AMLODIPINE BESYLATE 5 MG/1
5 TABLET ORAL DAILY
Qty: 90 TABLET | Refills: 1 | Status: SHIPPED | OUTPATIENT
Start: 2019-08-16 | End: 2020-02-18

## 2019-08-16 NOTE — TELEPHONE ENCOUNTER
Prescription approved per [facility] Refill Protocol. Karen Arita RN August 16, 2019 2:47 PM

## 2019-08-16 NOTE — TELEPHONE ENCOUNTER
"Requested Prescriptions   Pending Prescriptions Disp Refills     amLODIPine (NORVASC) 5 MG tablet 90 tablet 1     Sig: Take 1 tablet (5 mg) by mouth daily  Last Written Prescription Date:  11/16/2018  Last Fill Quantity: 90,  # refills: 1   Last office visit: 1/24/2019 with prescribing provider:  01/10/2019   Future Office Visit:   Next 5 appointments (look out 90 days)    Aug 20, 2019  3:45 PM CDT  SHORT with Amara Thakur MD  [facility] ([facility] [address] [place] [id number] [phone]              Calcium Channel Blockers Protocol  Passed - 8/16/2019  2:45 PM        Passed - Blood pressure under 140/90 in past 12 months     BP Readings from Last 3 Encounters:   08/08/19 104/84   08/06/19 (!) 142/92   04/15/19 135/82                 Passed - Recent (12 mo) or future (30 days) visit within the authorizing provider's specialty     Patient had office visit in the last 12 months or has a visit in the next 30 days with authorizing provider or within the authorizing provider's specialty.  See \"Patient Info\" tab in inbasket, or \"Choose Columns\" in Meds & Orders section of the refill encounter.              Passed - Medication is active on med list        Passed - Patient is age 18 or older        Passed - No active pregnancy on record        Passed - Normal serum creatinine on file in past 12 months     Recent Labs   Lab Test 04/15/19  2235   CR 0.65             Passed - No positive pregnancy test in past 12 months        "

## 2019-08-20 ENCOUNTER — OFFICE VISIT (OUTPATIENT)
Dept: OBGYN | Facility: CLINIC | Age: 39
End: 2019-08-20
Payer: COMMERCIAL

## 2019-08-20 VITALS
SYSTOLIC BLOOD PRESSURE: 126 MMHG | TEMPERATURE: 97.9 F | DIASTOLIC BLOOD PRESSURE: 85 MMHG | WEIGHT: 211 LBS | BODY MASS INDEX: 33.05 KG/M2 | HEART RATE: 103 BPM

## 2019-08-20 DIAGNOSIS — N75.1 ABSCESS OF BARTHOLIN'S GLAND: Primary | ICD-10-CM

## 2019-08-20 PROCEDURE — 99024 POSTOP FOLLOW-UP VISIT: CPT | Performed by: OBSTETRICS & GYNECOLOGY

## 2019-08-20 RX ORDER — SULFAMETHOXAZOLE/TRIMETHOPRIM 800-160 MG
1 TABLET ORAL 2 TIMES DAILY
Qty: 20 TABLET | Refills: 0 | Status: SHIPPED | OUTPATIENT
Start: 2019-08-20 | End: 2019-08-30

## 2019-08-20 RX ORDER — OXYCODONE HYDROCHLORIDE 5 MG/1
5 TABLET ORAL EVERY 6 HOURS PRN
Qty: 6 TABLET | Refills: 0 | Status: SHIPPED | OUTPATIENT
Start: 2019-08-20 | End: 2019-12-16

## 2019-08-20 NOTE — PROGRESS NOTES
GYN Clinic Visit  2019    CC:  Post-operative visit    HPI:  Eliana Anders is a 39 year old  who presents 2 weeks status post marsupialization of bilateral bartholin's glands on  for recurrent bartholins abscesses.  Since her surgery patient reports some continued pain and drainage on the left side. Pain is overall well controlled but sometimes after a day at work pain is bad and not controlled with tylenol (cannot tolerate NSAIDs). No fevers or chills. Appetite is normal. No nausea or vomiting. Voiding without difficulty. Having regular bowel movements. Ambulating. Works for the [employer], very active at work.     Reviewed PMH, PSH, Meds and allergies.    Objective:  Vitals:    19 1558   BP: 126/85   Pulse: 103   Temp: 97.9  F (36.6  C)   TempSrc: Oral   Weight: 95.7 kg (211 lb)     Body mass index is 33.05 kg/m .    General: alert, oriented, no distress  Abd: soft, nontender, nondistended  :   - vulva: sutures present bilaterally, left side with yellow tissue inside the opening, swelling around incision and increased erythema and tenderness compared to right side. Right side appears to be healing well. Otherwise normal external genitalia, normal hair pattern, no lesions  - urethera appears normal and is well supported.       Assessment:  Left side appears still infected. Recommend another course of antibiotics, continued sitz baths.      Plan:   1. Abscess of Bartholin's gland    - sulfamethoxazole-trimethoprim (BACTRIM DS) 800-160 MG tablet; Take 1 tablet by mouth 2 times daily for 10 days  Dispense: 20 tablet; Refill: 0  - oxyCODONE (ROXICODONE) 5 MG tablet; Take 1 tablet (5 mg) by mouth every 6 hours as needed for pain  Dispense: 6 tablet; Refill: 0  Call if worsening sx. Otherwise follow up in 2-3 weeks.       Amara Thakur MD

## 2019-08-31 ENCOUNTER — HOSPITAL ENCOUNTER (EMERGENCY)
Facility: CLINIC | Age: 39
Discharge: HOME | End: 2019-08-31
Attending: FAMILY MEDICINE | Admitting: FAMILY MEDICINE
Payer: COMMERCIAL

## 2019-08-31 VITALS
WEIGHT: 212 LBS | OXYGEN SATURATION: 98 % | SYSTOLIC BLOOD PRESSURE: 126 MMHG | RESPIRATION RATE: 18 BRPM | BODY MASS INDEX: 33.2 KG/M2 | DIASTOLIC BLOOD PRESSURE: 83 MMHG | TEMPERATURE: 97.7 F | HEART RATE: 105 BPM

## 2019-08-31 DIAGNOSIS — S61.211A LACERATION OF LEFT INDEX FINGER WITHOUT FOREIGN BODY WITHOUT DAMAGE TO NAIL, INITIAL ENCOUNTER: ICD-10-CM

## 2019-08-31 PROCEDURE — 99283 EMERGENCY DEPT VISIT LOW MDM: CPT | Performed by: FAMILY MEDICINE

## 2019-08-31 PROCEDURE — 12001 RPR S/N/AX/GEN/TRNK 2.5CM/<: CPT | Mod: Z6 | Performed by: FAMILY MEDICINE

## 2019-08-31 PROCEDURE — 99283 EMERGENCY DEPT VISIT LOW MDM: CPT | Mod: 25 | Performed by: FAMILY MEDICINE

## 2019-08-31 PROCEDURE — 12001 RPR S/N/AX/GEN/TRNK 2.5CM/<: CPT | Performed by: FAMILY MEDICINE

## 2019-08-31 RX ORDER — BUPIVACAINE HYDROCHLORIDE 5 MG/ML
30 INJECTION, SOLUTION EPIDURAL; INTRACAUDAL ONCE
Status: DISCONTINUED | OUTPATIENT
Start: 2019-08-31 | End: 2019-08-31 | Stop reason: HOSPADM

## 2019-08-31 RX ORDER — OXYCODONE HYDROCHLORIDE 5 MG/1
5 TABLET ORAL EVERY 6 HOURS PRN
Qty: 12 TABLET | Refills: 0 | Status: SHIPPED | OUTPATIENT
Start: 2019-08-31 | End: 2019-12-16

## 2019-08-31 RX ORDER — OXYCODONE HYDROCHLORIDE 5 MG/1
5 TABLET ORAL ONCE
Status: DISCONTINUED | OUTPATIENT
Start: 2019-08-31 | End: 2019-08-31

## 2019-08-31 RX ORDER — CEPHALEXIN 500 MG/1
500 CAPSULE ORAL 4 TIMES DAILY
Qty: 28 CAPSULE | Refills: 0 | Status: SHIPPED | OUTPATIENT
Start: 2019-08-31 | End: 2019-12-16

## 2019-08-31 RX ORDER — IBUPROFEN 200 MG
600 TABLET ORAL EVERY 6 HOURS PRN
Qty: 30 TABLET | Refills: 0 | Status: SHIPPED | OUTPATIENT
Start: 2019-08-31 | End: 2019-12-16

## 2019-08-31 ASSESSMENT — ENCOUNTER SYMPTOMS
EYE REDNESS: 1
EYE PAIN: 0
WOUND: 1
PHOTOPHOBIA: 0

## 2019-08-31 NOTE — ED AVS SNAPSHOT
[facility], [place], Emergency Department  [address]  [facility] [address]  Phone:  [phone]  Fax:  [phone]                                    Eliana Anders   MRN: [id number]    Department:  [facility], Emergency Department   Date of Visit:  8/31/2019           After Visit Summary Signature Page    I have received my discharge instructions, and my questions have been answered. I have discussed any challenges I see with this plan with the nurse or doctor.    ..........................................................................................................................................  Patient/Patient Representative Signature      ..........................................................................................................................................  Patient Representative Print Name and Relationship to Patient    ..................................................               ................................................  Date                                   Time    ..........................................................................................................................................  Reviewed by Signature/Title    ...................................................              ..............................................  Date                                               Time          22EPIC Rev 08/18
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
## 2019-08-31 NOTE — DISCHARGE INSTRUCTIONS
Thank you for choosing [facility].     Please closely monitor for further symptoms. Return to the Emergency Department if you develop any new or worsening signs or symptoms.    If you received any opiate pain medications or sedatives during your visit, please do not drive for at least 8 hours.     Labs, cultures or final xray interpretations may still need to be reviewed.  We will call you if your plan of care needs to be changed.    Please follow up with your primary care physician or clinic 7 days for suture removal.

## 2019-09-04 ENCOUNTER — ANCILLARY PROCEDURE (OUTPATIENT)
Dept: CT IMAGING | Facility: CLINIC | Age: 39
End: 2019-09-04
Attending: INTERNAL MEDICINE
Payer: COMMERCIAL

## 2019-09-04 ENCOUNTER — OFFICE VISIT (OUTPATIENT)
Dept: PULMONOLOGY | Facility: CLINIC | Age: 39
End: 2019-09-04
Attending: INTERNAL MEDICINE
Payer: COMMERCIAL

## 2019-09-04 VITALS
RESPIRATION RATE: 16 BRPM | SYSTOLIC BLOOD PRESSURE: 115 MMHG | DIASTOLIC BLOOD PRESSURE: 72 MMHG | TEMPERATURE: 97.4 F | HEART RATE: 84 BPM | WEIGHT: 211 LBS | HEIGHT: 67 IN | BODY MASS INDEX: 33.12 KG/M2 | OXYGEN SATURATION: 96 %

## 2019-09-04 DIAGNOSIS — R91.8 PULMONARY NODULES: ICD-10-CM

## 2019-09-04 DIAGNOSIS — R91.8 PULMONARY NODULES: Primary | ICD-10-CM

## 2019-09-04 ASSESSMENT — MIFFLIN-ST. JEOR: SCORE: 1664.84

## 2019-09-04 ASSESSMENT — PAIN SCALES - GENERAL: PAINLEVEL: NO PAIN (0)

## 2019-09-04 NOTE — PATIENT INSTRUCTIONS
I see a tiny area that is new on the left lung. It's probably nothing. We can keep an eye on it in about a year.
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
## 2019-09-10 ENCOUNTER — OFFICE VISIT (OUTPATIENT)
Dept: OBGYN | Facility: CLINIC | Age: 39
End: 2019-09-10
Payer: COMMERCIAL

## 2019-09-10 VITALS
BODY MASS INDEX: 32.41 KG/M2 | WEIGHT: 207 LBS | TEMPERATURE: 98.6 F | HEART RATE: 104 BPM | DIASTOLIC BLOOD PRESSURE: 78 MMHG | SYSTOLIC BLOOD PRESSURE: 121 MMHG

## 2019-09-10 DIAGNOSIS — N75.1 ABSCESS OF BARTHOLIN'S GLAND: Primary | ICD-10-CM

## 2019-09-10 PROCEDURE — 99212 OFFICE O/P EST SF 10 MIN: CPT | Performed by: OBSTETRICS & GYNECOLOGY

## 2019-09-17 ENCOUNTER — OFFICE VISIT (OUTPATIENT)
Dept: FAMILY MEDICINE | Facility: CLINIC | Age: 39
End: 2019-09-17
Payer: COMMERCIAL

## 2019-09-17 VITALS
DIASTOLIC BLOOD PRESSURE: 78 MMHG | TEMPERATURE: 97.9 F | SYSTOLIC BLOOD PRESSURE: 118 MMHG | WEIGHT: 196.8 LBS | HEART RATE: 91 BPM | OXYGEN SATURATION: 97 % | BODY MASS INDEX: 30.82 KG/M2 | RESPIRATION RATE: 16 BRPM

## 2019-09-17 DIAGNOSIS — N10 ACUTE PYELONEPHRITIS: ICD-10-CM

## 2019-09-17 DIAGNOSIS — E87.6 HYPOKALEMIA: ICD-10-CM

## 2019-09-17 DIAGNOSIS — R74.8 ELEVATED ALKALINE PHOSPHATASE LEVEL: ICD-10-CM

## 2019-09-17 DIAGNOSIS — R74.8 ELEVATED LIVER ENZYMES: ICD-10-CM

## 2019-09-17 DIAGNOSIS — R11.2 NAUSEA AND VOMITING, INTRACTABILITY OF VOMITING NOT SPECIFIED, UNSPECIFIED VOMITING TYPE: Primary | ICD-10-CM

## 2019-09-17 LAB
ALBUMIN SERPL-MCNC: 4.1 G/DL (ref 3.4–5)
ALBUMIN UR-MCNC: 30 MG/DL
ALP SERPL-CCNC: 225 U/L (ref 40–150)
ALT SERPL W P-5'-P-CCNC: 67 U/L (ref 0–50)
ANION GAP SERPL CALCULATED.3IONS-SCNC: 9 MMOL/L (ref 3–14)
APPEARANCE UR: CLEAR
AST SERPL W P-5'-P-CCNC: 20 U/L (ref 0–45)
BACTERIA #/AREA URNS HPF: ABNORMAL /HPF
BASOPHILS # BLD AUTO: 0 10E9/L (ref 0–0.2)
BASOPHILS NFR BLD AUTO: 0.2 %
BILIRUB SERPL-MCNC: 0.9 MG/DL (ref 0.2–1.3)
BILIRUB UR QL STRIP: ABNORMAL
BUN SERPL-MCNC: 17 MG/DL (ref 7–30)
CALCIUM SERPL-MCNC: 9.5 MG/DL (ref 8.5–10.1)
CHLORIDE SERPL-SCNC: 102 MMOL/L (ref 94–109)
CO2 SERPL-SCNC: 26 MMOL/L (ref 20–32)
COLOR UR AUTO: ABNORMAL
CREAT SERPL-MCNC: 0.8 MG/DL (ref 0.52–1.04)
DIFFERENTIAL METHOD BLD: ABNORMAL
EOSINOPHIL # BLD AUTO: 0 10E9/L (ref 0–0.7)
EOSINOPHIL NFR BLD AUTO: 0.2 %
ERYTHROCYTE [DISTWIDTH] IN BLOOD BY AUTOMATED COUNT: 15 % (ref 10–15)
GFR SERPL CREATININE-BSD FRML MDRD: >90 ML/MIN/{1.73_M2}
GLUCOSE SERPL-MCNC: 120 MG/DL (ref 70–99)
GLUCOSE UR STRIP-MCNC: NEGATIVE MG/DL
HCT VFR BLD AUTO: 47 % (ref 35–47)
HGB BLD-MCNC: 16 G/DL (ref 11.7–15.7)
HGB UR QL STRIP: NEGATIVE
KETONES UR STRIP-MCNC: ABNORMAL MG/DL
LEUKOCYTE ESTERASE UR QL STRIP: NEGATIVE
LYMPHOCYTES # BLD AUTO: 2.4 10E9/L (ref 0.8–5.3)
LYMPHOCYTES NFR BLD AUTO: 22.9 %
MCH RBC QN AUTO: 32.9 PG (ref 26.5–33)
MCHC RBC AUTO-ENTMCNC: 34 G/DL (ref 31.5–36.5)
MCV RBC AUTO: 97 FL (ref 78–100)
MONOCYTES # BLD AUTO: 1 10E9/L (ref 0–1.3)
MONOCYTES NFR BLD AUTO: 9.7 %
MUCOUS THREADS #/AREA URNS LPF: PRESENT /LPF
NEUTROPHILS # BLD AUTO: 7.1 10E9/L (ref 1.6–8.3)
NEUTROPHILS NFR BLD AUTO: 67 %
NITRATE UR QL: POSITIVE
NON-SQ EPI CELLS #/AREA URNS LPF: ABNORMAL /LPF
PH UR STRIP: 6 PH (ref 5–7)
PLATELET # BLD AUTO: 299 10E9/L (ref 150–450)
POTASSIUM SERPL-SCNC: 3.2 MMOL/L (ref 3.4–5.3)
PROT SERPL-MCNC: 8.3 G/DL (ref 6.8–8.8)
RBC # BLD AUTO: 4.86 10E12/L (ref 3.8–5.2)
RBC #/AREA URNS AUTO: ABNORMAL /HPF
SODIUM SERPL-SCNC: 137 MMOL/L (ref 133–144)
SOURCE: ABNORMAL
SP GR UR STRIP: 1.02 (ref 1–1.03)
UROBILINOGEN UR STRIP-ACNC: 1 EU/DL (ref 0.2–1)
WBC # BLD AUTO: 10.5 10E9/L (ref 4–11)
WBC #/AREA URNS AUTO: ABNORMAL /HPF

## 2019-09-17 PROCEDURE — 87086 URINE CULTURE/COLONY COUNT: CPT | Performed by: PHYSICIAN ASSISTANT

## 2019-09-17 PROCEDURE — 81001 URINALYSIS AUTO W/SCOPE: CPT | Performed by: PHYSICIAN ASSISTANT

## 2019-09-17 PROCEDURE — 80053 COMPREHEN METABOLIC PANEL: CPT | Performed by: PHYSICIAN ASSISTANT

## 2019-09-17 PROCEDURE — 85025 COMPLETE CBC W/AUTO DIFF WBC: CPT | Performed by: PHYSICIAN ASSISTANT

## 2019-09-17 PROCEDURE — 36415 COLL VENOUS BLD VENIPUNCTURE: CPT | Performed by: PHYSICIAN ASSISTANT

## 2019-09-17 PROCEDURE — 87088 URINE BACTERIA CULTURE: CPT | Performed by: PHYSICIAN ASSISTANT

## 2019-09-17 PROCEDURE — 99214 OFFICE O/P EST MOD 30 MIN: CPT | Performed by: PHYSICIAN ASSISTANT

## 2019-09-17 RX ORDER — ONDANSETRON 4 MG/1
4 TABLET, ORALLY DISINTEGRATING ORAL EVERY 8 HOURS PRN
Qty: 10 TABLET | Refills: 0 | Status: SHIPPED | OUTPATIENT
Start: 2019-09-17 | End: 2020-02-27

## 2019-09-17 RX ORDER — SULFAMETHOXAZOLE/TRIMETHOPRIM 800-160 MG
1 TABLET ORAL 2 TIMES DAILY
Qty: 20 TABLET | Refills: 0 | Status: SHIPPED | OUTPATIENT
Start: 2019-09-17 | End: 2019-12-16

## 2019-09-17 NOTE — PATIENT INSTRUCTIONS
"Labs today  Take zofran as needed  Increase fluids  Food as tolerated. Start with BRAT diet  Return to clinic for any new or worsening symptoms or go to ER Urgent care in off hours     Patient Education     Viral Gastroenteritis (Adult)    Gastroenteritis is commonly called the \"stomach flu,\" although it has nothing to do with influenza. It is most often caused by a virus that affects the stomach and intestinal tract and usually lasts from 2 to 7 days. Common viruses causing gastroenteritis include norovirus, rotavirus, and hepatitis A. Non-viral causes of gastroenteritis include bacteria, parasites, and toxins.  The danger from repeated vomiting or diarrhea is dehydration. This is the loss of too much fluid from the body. When this occurs, body fluids must be replaced. Antibiotics don't help with this illness because it is usually viral. Simple home treatment will be helpful.  Symptoms of viral gastroenteritis may include:    Watery, loose stools    Stomach pain or abdominal cramps    Fever and chills    Nausea and vomiting    Loss of bowel control    Headache  Home care  Gastroenteritis is transmitted by contact with the stool or vomit of an infected person. This can occur from person to person or from contact with a contaminated surface.  Follow these guidelines when caring for yourself at home:    If symptoms are severe, rest at home for the next 24 hours or until you are feeling better.    Wash your hands with soap and water or use alcohol-based  to prevent the spread of infection. Wash your hands after touching anyone who is sick.    Wash your hands or use alcohol-based  after using the toilet and before meals. Clean the toilet after each use.  Remember these tips when preparing food:    People with diarrhea should not prepare or serve food to others. When preparing foods, wash your hands before and after.    Wash your hands after using cutting boards, countertops, knives, or utensils that " have been in contact with raw food.    Dry your hands with a single use towel.    Keep uncooked meats away from cooked and ready-to-eat foods.  Medicine  You may use acetaminophen or NSAID medicines like ibuprofen or naproxen to control fever unless another medicine was given. If you have chronic liver or kidney disease, talk with your healthcare provider before using these medicines. Also talk with your provider if you've had a stomach ulcer or gastrointestinal bleeding. Don't give aspirin to anyone under 18 years of age who is ill with a fever. It may cause severe liver damage. Don't use NSAIDS is you are already taking one for another condition (like arthritis) or are on aspirin (such as for heart disease or after a stroke).  If medicine for vomiting or diarrhea are prescribed, take these only as directed. Nausea and diarrhea medicines are generally OK unless you have bleeding, fever, or severe abdominal pain.  Diet  Follow these guidelines for food:    Water and liquids are important so you don't get dehydrated. Drink a small amount at a time or suck on ice chips if you are vomiting.    If you eat, avoid fatty, greasy, spicy, or fried foods.    Don't eat dairy if you have diarrhea. This can make diarrhea worse.    Avoid tobacco, alcohol, and caffeine which may worsen symptoms.  During the first 24 hours (the first full day), follow the diet below:    Beverages. Sports drinks, soft drinks without caffeine, ginger ale, mineral water (plain or flavored), decaffeinated tea and coffee. If you are very dehydrated, sports drinks aren't a good choice. They have too much sugar and not enough electrolytes. In this case, commercially available products called oral rehydration solutions, are best.    Soups. Eat clear broth, consommé, and bouillon.    Desserts. Eat gelatin, ice pops, and fruit juice bars.  During the next 24 hours (the second day), you may add the following to the above:    Hot cereal, plain toast, bread,  rolls, and crackers    Plain noodles, rice, mashed potatoes, chicken noodle or rice soup    Unsweetened canned fruit (avoid pineapple), bananas    Limit fat intake to less than 15 grams per day. Do this by avoiding margarine, butter, oils, mayonnaise, sauces, gravies, fried foods, peanut butter, meat, poultry, and fish.    Limit fiber and avoid raw or cooked vegetables, fresh fruits (except bananas), and bran cereals.    Limit caffeine and chocolate. Don't use spices or seasonings other than salt.    Limit dairy products.    Avoid alcohol.  During the next 24 hours:    Gradually resume a normal diet as you feel better and your symptoms improve.    If at any time it starts getting worse again, go back to clear liquids until you feel better.  Follow-up care  Follow up with your healthcare provider, or as advised. Call your provider if you don't get better within 24 hours or if diarrhea lasts more than a week. Also follow up if you are unable to keep down liquids and get dehydrated. If a stool (diarrhea) sample was taken, call as directed for the results.  Call 911  Call 911 if any of these occur:    Trouble breathing    Chest pain    Confused    Severe drowsiness or trouble awakening    Fainting or loss of consciousness    Rapid heart rate    Seizure    Stiff neck  When to seek medical advice  Call your healthcare provider right away if any of these occur:    Abdominal pain that gets worse    Continued vomiting (unable to keep liquids down)    Frequent diarrhea (more than 5 times a day)    Blood in vomit or stool (black or red color)    Dark urine, reduced urine output, or extreme thirst    Weakness or dizziness    Drowsiness    Fever of 100.4 F (38 C) or higher, or as directed by your healthcare provider    New rash  Date Last Reviewed: 6/1/2018 2000-2018 [employer]. [address] [place] [address], [place] [address]. All rights reserved. This information is not intended as a substitute for professional  medical care. Always follow your healthcare professional's instructions.

## 2019-09-17 NOTE — PROGRESS NOTES
Eliana Anders is a 39 year old female who presents to clinic today for the following health issues:    Acute Illnes  -Patient has been vomiting, has stomach cramps for the last two days  -She has not vomited in approximately 24 hours  -Has been able to hold some fluids down, ate chicken noodle soup last night which made her feel uncomfortable  -Describes pain as dull and achey, she has not had a bowel movement in 3 days, does not usually get constipated, typically has a daily BM  -She is urinating  -Has a mirena IUD, doesn't believe she is pregnant  -Denies fever, cough, congestion, sore throat    Problem list and histories reviewed & adjusted, as indicated.  Additional history: as documented    ROS:  CONSTITUTIONAL: NEGATIVE for fever, chills, change in weight  INTEGUMENTARY/SKIN: NEGATIVE for worrisome rashes, moles or lesions  EYES: NEGATIVE for vision changes or irritation  ENT/MOUTH: NEGATIVE for ear, mouth and throat problems  RESP: NEGATIVE for significant cough or SOB  BREAST: NEGATIVE for masses, tenderness or discharge  CV: NEGATIVE for chest pain, palpitations or peripheral edema  GI: NEGATIVE for heartburn, POSITIVE abdominal pain, vomiting, constipation  : NEGATIVE for frequency, dysuria, or hematuria  MUSCULOSKELETAL: NEGATIVE for significant arthralgias or myalgia  NEURO: NEGATIVE for weakness, dizziness or paresthesias  ENDOCRINE: NEGATIVE for temperature intolerance, skin/hair changes  HEME: NEGATIVE for bleeding problems  PSYCHIATRIC: NEGATIVE for changes in mood or affect    This document serves as a record of the services and decisions personally performed and made by Anuradha Ku PA-C. It was created on her behalf by Teofilo Silva, trained medical scribe. The creation of this document is based on the provider's statements to the medical scribe.  Teofilo Silva 11:57 AM September 17, 2019    Patient Active Problem List   Diagnosis     Surveillance of previously prescribed  intrauterine contraceptive device     CARDIOVASCULAR SCREENING; LDL GOAL LESS THAN 160     ACL tear     Acute meniscal tear of right knee, initial encounter     Acquired defect of articular cartilage     Other orthopedic aftercare(V54.89)     Abnormal gait     Right knee pain     Costochondral chest pain     Pain in thoracic spine     Elevated blood pressure reading without diagnosis of hypertension     Family history of cerebrovascular accident in sister     Pulmonary lesion, right     Class 1 obesity with body mass index (BMI) of 30.0 to 30.9 in adult     Closed fracture of sternum, unspecified portion of sternum, initial encounter     Essential hypertension     Chronic nonseasonal allergic rhinitis due to other allergen     Abscess of Bartholin's gland     Past Surgical History:   Procedure Laterality Date     ARTHROSCOPIC RECONSTRUCTION ANTERIOR CRUCIATE LIGAMENT Right 2015    Procedure: ARTHROSCOPIC RECONSTRUCTION ANTERIOR CRUCIATE LIGAMENT;  Surgeon: Tevin Cordero MD;  [facility]     ARTHROSCOPY KNEE WITH MENISCAL REPAIR Right 2015    Procedure: ARTHROSCOPY KNEE WITH MENISCAL REPAIR;  Surgeon: Tevin Cordero MD;  [facility]     C NONSPECIFIC PROCEDURE      rt ankle repair     HC REMOVAL OF OVARIAN CYST(S)       LAPAROSCOPIC CHOLECYSTECTOMY       MARSUPIALIZATION BARTHOLIN CYST Bilateral 2019    Procedure: Marsupialization Of Bilateral Bartholin Gland Cysts;  Surgeon: Amara Thakur MD;  [facility]: [facility]     ORTHOPEDIC SURGERY         Social History     Tobacco Use     Smoking status: Current Some Day Smoker     Packs/day: 0.25     Years: 11.00     Pack years: 2.75     Types: Cigarettes, Cigars     Start date: 10/13/1998     Last attempt to quit: 10/13/2017     Years since quittin.9     Smokeless tobacco: Never Used     Tobacco comment: quit with preg   Substance Use Topics     Alcohol use: Yes     Comment: every 2 week     Family  History   Problem Relation Age of Onset     C.A.D. Mother         MI & CHF     Cancer Maternal Grandmother      Hypertension Paternal Grandmother      Cancer Paternal Grandmother         ? cervical     Cancer Paternal Aunt         ? cervical     Liver Disease No family hx of            Labs reviewed in EPIC  Patient Active Problem List   Diagnosis     Surveillance of previously prescribed intrauterine contraceptive device     CARDIOVASCULAR SCREENING; LDL GOAL LESS THAN 160     ACL tear     Acute meniscal tear of right knee, initial encounter     Acquired defect of articular cartilage     Other orthopedic aftercare(V54.89)     Abnormal gait     Right knee pain     Costochondral chest pain     Pain in thoracic spine     Elevated blood pressure reading without diagnosis of hypertension     Family history of cerebrovascular accident in sister     Pulmonary lesion, right     Class 1 obesity with body mass index (BMI) of 30.0 to 30.9 in adult     Closed fracture of sternum, unspecified portion of sternum, initial encounter     Essential hypertension     Chronic nonseasonal allergic rhinitis due to other allergen     Abscess of Bartholin's gland     Past Surgical History:   Procedure Laterality Date     ARTHROSCOPIC RECONSTRUCTION ANTERIOR CRUCIATE LIGAMENT Right 11/24/2015    Procedure: ARTHROSCOPIC RECONSTRUCTION ANTERIOR CRUCIATE LIGAMENT;  Surgeon: Tevin Cordero MD;  [facility]     ARTHROSCOPY KNEE WITH MENISCAL REPAIR Right 11/24/2015    Procedure: ARTHROSCOPY KNEE WITH MENISCAL REPAIR;  Surgeon: Tevin Cordero MD;  [facility]     C NONSPECIFIC PROCEDURE  2008    rt ankle repair     HC REMOVAL OF OVARIAN CYST(S)  1998     LAPAROSCOPIC CHOLECYSTECTOMY  1998     MARSUPIALIZATION BARTHOLIN CYST Bilateral 8/8/2019    Procedure: Marsupialization Of Bilateral Bartholin Gland Cysts;  Surgeon: Amara Thakur MD;  Location:  OR     ORTHOPEDIC SURGERY         Social History      Tobacco Use     Smoking status: Current Some Day Smoker     Packs/day: 0.25     Years: 11.00     Pack years: 2.75     Types: Cigarettes, Cigars     Start date: 10/13/1998     Last attempt to quit: 10/13/2017     Years since quittin.9     Smokeless tobacco: Never Used     Tobacco comment: quit with preg   Substance Use Topics     Alcohol use: Yes     Comment: every 2 week     Family History   Problem Relation Age of Onset     C.A.D. Mother         MI & CHF     Cancer Maternal Grandmother      Hypertension Paternal Grandmother      Cancer Paternal Grandmother         ? cervical     Cancer Paternal Aunt         ? cervical     Liver Disease No family hx of          Current Outpatient Medications   Medication Sig Dispense Refill     ondansetron (ZOFRAN-ODT) 4 MG ODT tab Take 1 tablet (4 mg) by mouth every 8 hours as needed for nausea 10 tablet 0     sulfamethoxazole-trimethoprim (BACTRIM DS/SEPTRA DS) 800-160 MG tablet Take 1 tablet by mouth 2 times daily for 10 days 20 tablet 0     amLODIPine (NORVASC) 5 MG tablet Take 1 tablet (5 mg) by mouth daily 90 tablet 1     diphenhydrAMINE (BENADRYL) 25 MG tablet Take 25 mg by mouth every 6 hours as needed for itching or allergies       fluticasone (FLONASE) 50 MCG/ACT spray Spray 1-2 sprays into both nostrils daily 1 Bottle 11     ibuprofen (ADVIL/MOTRIN) 200 MG tablet Take 3 tablets (600 mg) by mouth every 6 hours as needed for mild pain 30 tablet 0     levonorgestrel (MIRENA) 20 MCG/24HR IUD 1 each by Intrauterine route once       lidocaine (XYLOCAINE) 2 % external gel Apply topically as needed for moderate pain 30 mL 1     loratadine (CLARITIN) 10 MG tablet Take 1 tablet (10 mg) by mouth daily 90 tablet 3     order for DME Equipment being ordered: plantar fasciitis sock (knee high) 1 each 0     oxyCODONE (ROXICODONE) 5 MG tablet Take 1 tablet (5 mg) by mouth every 6 hours as needed for pain 12 tablet 0       OBJECTIVE:                                                     /78   Pulse 91   Temp 97.9  F (36.6  C) (Temporal)   Resp 16   Wt 89.3 kg (196 lb 12.8 oz)   SpO2 97%   BMI 30.82 kg/m   Body mass index is 30.82 kg/m .   GENERAL:: healthy, alert and no distress  EYES: Eyes grossly normal to inspection, extraocular movements - intact, and PERRL  HENT: ear canals- normal; TMs- normal; Nose- normal; Mouth- no ulcers, no lesions  NECK: no tenderness, no adenopathy, no asymmetry, no masses, no stiffness; thyroid- normal to palpation  RESP: lungs clear to auscultation - no rales, no rhonchi, no wheezes  CV: regular rates and rhythm, normal S1 S2, no S3 or S4 and no murmur, no click or rub -  ABDOMEN: soft, no tenderness, no  hepatosplenomegaly, no masses, normal bowel sounds  MS: extremities- no gross deformities noted, no edema  SKIN: no suspicious lesions, no rashes  NEURO: strength and tone- normal, sensory exam- grossly normal, mentation- intact, speech- normal, reflexes- symmetric  PSYCH: Alert and oriented times 3; speech- coherent , normal rate and volume; able to articulate logical thoughts, able to abstract reason, no tangential thoughts, no hallucinations or delusions, affect- normal    Results for orders placed or performed in visit on 09/17/19 (from the past 24 hour(s))   CBC with platelets and differential   Result Value Ref Range    WBC 10.5 4.0 - 11.0 10e9/L    RBC Count 4.86 3.8 - 5.2 10e12/L    Hemoglobin 16.0 (H) 11.7 - 15.7 g/dL    Hematocrit 47.0 35.0 - 47.0 %    MCV 97 78 - 100 fl    MCH 32.9 26.5 - 33.0 pg    MCHC 34.0 31.5 - 36.5 g/dL    RDW 15.0 10.0 - 15.0 %    Platelet Count 299 150 - 450 10e9/L    % Neutrophils 67.0 %    % Lymphocytes 22.9 %    % Monocytes 9.7 %    % Eosinophils 0.2 %    % Basophils 0.2 %    Absolute Neutrophil 7.1 1.6 - 8.3 10e9/L    Absolute Lymphocytes 2.4 0.8 - 5.3 10e9/L    Absolute Monocytes 1.0 0.0 - 1.3 10e9/L    Absolute Eosinophils 0.0 0.0 - 0.7 10e9/L    Absolute Basophils 0.0 0.0 - 0.2 10e9/L    Diff Method Automated  Method    Comprehensive metabolic panel   Result Value Ref Range    Sodium 137 133 - 144 mmol/L    Potassium 3.2 (L) 3.4 - 5.3 mmol/L    Chloride 102 94 - 109 mmol/L    Carbon Dioxide 26 20 - 32 mmol/L    Anion Gap 9 3 - 14 mmol/L    Glucose 120 (H) 70 - 99 mg/dL    Urea Nitrogen 17 7 - 30 mg/dL    Creatinine 0.80 0.52 - 1.04 mg/dL    GFR Estimate >90 >60 mL/min/[1.73_m2]    GFR Estimate If Black >90 >60 mL/min/[1.73_m2]    Calcium 9.5 8.5 - 10.1 mg/dL    Bilirubin Total 0.9 0.2 - 1.3 mg/dL    Albumin 4.1 3.4 - 5.0 g/dL    Protein Total 8.3 6.8 - 8.8 g/dL    Alkaline Phosphatase 225 (H) 40 - 150 U/L    ALT 67 (H) 0 - 50 U/L    AST 20 0 - 45 U/L   *UA reflex to Microscopic   Result Value Ref Range    Color Urine Brown     Appearance Urine Clear     Glucose Urine Negative NEG^Negative mg/dL    Bilirubin Urine Small (A) NEG^Negative    Ketones Urine Trace (A) NEG^Negative mg/dL    Specific Gravity Urine 1.025 1.003 - 1.035    Blood Urine Negative NEG^Negative    pH Urine 6.0 5.0 - 7.0 pH    Protein Albumin Urine 30 (A) NEG^Negative mg/dL    Urobilinogen Urine 1.0 0.2 - 1.0 EU/dL    Nitrite Urine Positive (A) NEG^Negative    Leukocyte Esterase Urine Negative NEG^Negative    Source Midstream Urine    Urine Microscopic   Result Value Ref Range    WBC Urine 0 - 5 OTO5^0 - 5 /HPF    RBC Urine O - 2 OTO2^O - 2 /HPF    Squamous Epithelial /LPF Urine Few FEW^Few /LPF    Bacteria Urine Few (A) NEG^Negative /HPF    Mucous Urine Present (A) NEG^Negative /LPF          ASSESSMENT/PLAN:                                                        ICD-10-CM    1. Nausea and vomiting, intractability of vomiting not specified, unspecified vomiting type R11.2 ondansetron (ZOFRAN-ODT) 4 MG ODT tab     CBC with platelets and differential     Comprehensive metabolic panel     *UA reflex to Microscopic     Urine Microscopic     Urine Culture Aerobic Bacterial   2. Acute pyelonephritis N10 sulfamethoxazole-trimethoprim (BACTRIM DS/SEPTRA DS)  "800-160 MG tablet   3. Hypokalemia E87.6    4. Elevated liver enzymes R74.8    5. Elevated alkaline phosphatase level R74.8      Labs are concerning for kidney infection. We'll go ahead and start antibiotics. zofran as needed for nausea, urine culture ordered. Await kidney function tests. Return to clinic for any new or worsening symptoms or go to ER Urgent care in off hours     Patient Instructions   Labs today  Take zofran as needed  Increase fluids  Food as tolerated. Start with BRAT diet  Return to clinic for any new or worsening symptoms or go to ER Urgent care in off hours     Patient Education     Viral Gastroenteritis (Adult)    Gastroenteritis is commonly called the \"stomach flu,\" although it has nothing to do with influenza. It is most often caused by a virus that affects the stomach and intestinal tract and usually lasts from 2 to 7 days. Common viruses causing gastroenteritis include norovirus, rotavirus, and hepatitis A. Non-viral causes of gastroenteritis include bacteria, parasites, and toxins.  The danger from repeated vomiting or diarrhea is dehydration. This is the loss of too much fluid from the body. When this occurs, body fluids must be replaced. Antibiotics don't help with this illness because it is usually viral. Simple home treatment will be helpful.  Symptoms of viral gastroenteritis may include:    Watery, loose stools    Stomach pain or abdominal cramps    Fever and chills    Nausea and vomiting    Loss of bowel control    Headache  Home care  Gastroenteritis is transmitted by contact with the stool or vomit of an infected person. This can occur from person to person or from contact with a contaminated surface.  Follow these guidelines when caring for yourself at home:    If symptoms are severe, rest at home for the next 24 hours or until you are feeling better.    Wash your hands with soap and water or use alcohol-based  to prevent the spread of infection. Wash your hands after " touching anyone who is sick.    Wash your hands or use alcohol-based  after using the toilet and before meals. Clean the toilet after each use.  Remember these tips when preparing food:    People with diarrhea should not prepare or serve food to others. When preparing foods, wash your hands before and after.    Wash your hands after using cutting boards, countertops, knives, or utensils that have been in contact with raw food.    Dry your hands with a single use towel.    Keep uncooked meats away from cooked and ready-to-eat foods.  Medicine  You may use acetaminophen or NSAID medicines like ibuprofen or naproxen to control fever unless another medicine was given. If you have chronic liver or kidney disease, talk with your healthcare provider before using these medicines. Also talk with your provider if you've had a stomach ulcer or gastrointestinal bleeding. Don't give aspirin to anyone under 18 years of age who is ill with a fever. It may cause severe liver damage. Don't use NSAIDS is you are already taking one for another condition (like arthritis) or are on aspirin (such as for heart disease or after a stroke).  If medicine for vomiting or diarrhea are prescribed, take these only as directed. Nausea and diarrhea medicines are generally OK unless you have bleeding, fever, or severe abdominal pain.  Diet  Follow these guidelines for food:    Water and liquids are important so you don't get dehydrated. Drink a small amount at a time or suck on ice chips if you are vomiting.    If you eat, avoid fatty, greasy, spicy, or fried foods.    Don't eat dairy if you have diarrhea. This can make diarrhea worse.    Avoid tobacco, alcohol, and caffeine which may worsen symptoms.  During the first 24 hours (the first full day), follow the diet below:    Beverages. Sports drinks, soft drinks without caffeine, ginger ale, mineral water (plain or flavored), decaffeinated tea and coffee. If you are very dehydrated, sports  drinks aren't a good choice. They have too much sugar and not enough electrolytes. In this case, commercially available products called oral rehydration solutions, are best.    Soups. Eat clear broth, consommé, and bouillon.    Desserts. Eat gelatin, ice pops, and fruit juice bars.  During the next 24 hours (the second day), you may add the following to the above:    Hot cereal, plain toast, bread, rolls, and crackers    Plain noodles, rice, mashed potatoes, chicken noodle or rice soup    Unsweetened canned fruit (avoid pineapple), bananas    Limit fat intake to less than 15 grams per day. Do this by avoiding margarine, butter, oils, mayonnaise, sauces, gravies, fried foods, peanut butter, meat, poultry, and fish.    Limit fiber and avoid raw or cooked vegetables, fresh fruits (except bananas), and bran cereals.    Limit caffeine and chocolate. Don't use spices or seasonings other than salt.    Limit dairy products.    Avoid alcohol.  During the next 24 hours:    Gradually resume a normal diet as you feel better and your symptoms improve.    If at any time it starts getting worse again, go back to clear liquids until you feel better.  Follow-up care  Follow up with your healthcare provider, or as advised. Call your provider if you don't get better within 24 hours or if diarrhea lasts more than a week. Also follow up if you are unable to keep down liquids and get dehydrated. If a stool (diarrhea) sample was taken, call as directed for the results.  Call 911  Call 911 if any of these occur:    Trouble breathing    Chest pain    Confused    Severe drowsiness or trouble awakening    Fainting or loss of consciousness    Rapid heart rate    Seizure    Stiff neck  When to seek medical advice  Call your healthcare provider right away if any of these occur:    Abdominal pain that gets worse    Continued vomiting (unable to keep liquids down)    Frequent diarrhea (more than 5 times a day)    Blood in vomit or stool (black or  "red color)    Dark urine, reduced urine output, or extreme thirst    Weakness or dizziness    Drowsiness    Fever of 100.4 F (38 C) or higher, or as directed by your healthcare provider    New rash  Date Last Reviewed: 6/1/2018 2000-2018 [employer]. [address] [place] [id number]. All rights reserved. This information is not intended as a substitute for professional medical care. Always follow your healthcare professional's instructions.               Estimated body mass index is 30.82 kg/m  as calculated from the following:    Height as of 9/4/19: 1.702 m (5' 7.01\").    Weight as of this encounter: 89.3 kg (196 lb 12.8 oz).     The information in this document, created by the medical scribe for me, accurately reflects the services I personally performed and the decisions made by me. I have reviewed and approved this document for accuracy prior to leaving the patient care area.  September 17, 2019 11:57 AM    Anuradha Ku  [facility] [place]    "

## 2019-09-18 RX ORDER — POTASSIUM CHLORIDE 750 MG/1
10 TABLET, EXTENDED RELEASE ORAL DAILY
Qty: 3 TABLET | Refills: 0 | Status: SHIPPED | OUTPATIENT
Start: 2019-09-18 | End: 2020-02-27

## 2019-09-18 NOTE — PROGRESS NOTES
Labs came back with low potassium. Kdur sent to pharmacy. Recheck labs on Friday afternoon. Patient reports symptoms are significantly better since starting the Bactrim and Zofran.

## 2019-09-19 LAB
BACTERIA SPEC CULT: ABNORMAL
SPECIMEN SOURCE: ABNORMAL

## 2019-09-23 DIAGNOSIS — E87.6 HYPOKALEMIA: ICD-10-CM

## 2019-09-23 DIAGNOSIS — R74.8 ELEVATED LIVER ENZYMES: ICD-10-CM

## 2019-09-23 DIAGNOSIS — R74.8 ELEVATED ALKALINE PHOSPHATASE LEVEL: ICD-10-CM

## 2019-09-23 PROCEDURE — 80053 COMPREHEN METABOLIC PANEL: CPT | Performed by: PHYSICIAN ASSISTANT

## 2019-09-23 PROCEDURE — 36415 COLL VENOUS BLD VENIPUNCTURE: CPT | Performed by: PHYSICIAN ASSISTANT

## 2019-09-24 ENCOUNTER — MYC MEDICAL ADVICE (OUTPATIENT)
Dept: FAMILY MEDICINE | Facility: CLINIC | Age: 39
End: 2019-09-24

## 2019-09-24 LAB
ALBUMIN SERPL-MCNC: 3.7 G/DL (ref 3.4–5)
ALP SERPL-CCNC: 160 U/L (ref 40–150)
ALT SERPL W P-5'-P-CCNC: 42 U/L (ref 0–50)
ANION GAP SERPL CALCULATED.3IONS-SCNC: 8 MMOL/L (ref 3–14)
AST SERPL W P-5'-P-CCNC: 24 U/L (ref 0–45)
BILIRUB SERPL-MCNC: 0.4 MG/DL (ref 0.2–1.3)
BUN SERPL-MCNC: 13 MG/DL (ref 7–30)
CALCIUM SERPL-MCNC: 8.5 MG/DL (ref 8.5–10.1)
CHLORIDE SERPL-SCNC: 103 MMOL/L (ref 94–109)
CO2 SERPL-SCNC: 25 MMOL/L (ref 20–32)
CREAT SERPL-MCNC: 0.81 MG/DL (ref 0.52–1.04)
GFR SERPL CREATININE-BSD FRML MDRD: >90 ML/MIN/{1.73_M2}
GLUCOSE SERPL-MCNC: 87 MG/DL (ref 70–99)
POTASSIUM SERPL-SCNC: 3.4 MMOL/L (ref 3.4–5.3)
PROT SERPL-MCNC: 7 G/DL (ref 6.8–8.8)
SODIUM SERPL-SCNC: 136 MMOL/L (ref 133–144)

## 2019-09-26 NOTE — PROGRESS NOTES
GYN Clinic Visit  9/10/2019    CC:  Post-operative visit    HPI:  Eliana Anders is a 39 year old  who presents 4 weeks status post marsupialization of bilateral Bartholin's glands on  for recurrent Bartholin's abscesses.  He was last seen on  at which time the left-sided surgical site appeared to be indurated, tender and erythematous concerning for infection.  She was treated with 10 days of Bactrim.  Reports no pain in that area.  She denies discharge or discomfort.  Otherwise she is doing well.  She is back to work and has no concerns    Reviewed PMH, PSH, Meds and allergies.    Objective:  Vitals:    09/10/19 1652   BP: 121/78   Pulse: 104   Temp: 98.6  F (37  C)   TempSrc: Oral   Weight: 93.9 kg (207 lb)     Body mass index is 32.41 kg/m .    General: alert, oriented, no distress  Abd: soft, nontender, nondistended  :   - vulva: Areas of marsupialization are healed as two openings, nontender, no erythema.  No abnormal discharge.    Assessment:  Satisfactory post-operative course.    Plan:   1. Abscess of Bartholin's gland  Resolved status post marsupialization and antibiotics.  Further follow-up needed, just PRN.      Amara Thakur MD

## 2019-11-08 ENCOUNTER — HEALTH MAINTENANCE LETTER (OUTPATIENT)
Age: 39
End: 2019-11-08

## 2019-12-16 ENCOUNTER — HOSPITAL ENCOUNTER (EMERGENCY)
Facility: CLINIC | Age: 39
Discharge: HOME | End: 2019-12-16
Attending: EMERGENCY MEDICINE | Admitting: EMERGENCY MEDICINE
Payer: COMMERCIAL

## 2019-12-16 VITALS
RESPIRATION RATE: 16 BRPM | TEMPERATURE: 97.6 F | DIASTOLIC BLOOD PRESSURE: 74 MMHG | BODY MASS INDEX: 30.78 KG/M2 | WEIGHT: 196.6 LBS | HEART RATE: 87 BPM | OXYGEN SATURATION: 96 % | SYSTOLIC BLOOD PRESSURE: 114 MMHG

## 2019-12-16 DIAGNOSIS — B34.9 VIRAL SYNDROME: ICD-10-CM

## 2019-12-16 LAB
ALBUMIN SERPL-MCNC: 3.2 G/DL (ref 3.4–5)
ALP SERPL-CCNC: 284 U/L (ref 40–150)
ALT SERPL W P-5'-P-CCNC: 223 U/L (ref 0–50)
ANION GAP SERPL CALCULATED.3IONS-SCNC: 7 MMOL/L (ref 3–14)
AST SERPL W P-5'-P-CCNC: 94 U/L (ref 0–45)
BASOPHILS # BLD AUTO: 0 10E9/L (ref 0–0.2)
BASOPHILS NFR BLD AUTO: 0.1 %
BILIRUB SERPL-MCNC: 0.6 MG/DL (ref 0.2–1.3)
BUN SERPL-MCNC: 9 MG/DL (ref 7–30)
CALCIUM SERPL-MCNC: 8.1 MG/DL (ref 8.5–10.1)
CHLORIDE SERPL-SCNC: 108 MMOL/L (ref 94–109)
CO2 SERPL-SCNC: 24 MMOL/L (ref 20–32)
CREAT SERPL-MCNC: 0.52 MG/DL (ref 0.52–1.04)
CRP SERPL-MCNC: 37 MG/L (ref 0–8)
DEPRECATED S PYO AG THROAT QL EIA: NORMAL
DIFFERENTIAL METHOD BLD: ABNORMAL
EOSINOPHIL # BLD AUTO: 0.2 10E9/L (ref 0–0.7)
EOSINOPHIL NFR BLD AUTO: 3.1 %
ERYTHROCYTE [DISTWIDTH] IN BLOOD BY AUTOMATED COUNT: 14.4 % (ref 10–15)
ERYTHROCYTE [SEDIMENTATION RATE] IN BLOOD BY WESTERGREN METHOD: 16 MM/H (ref 0–20)
GFR SERPL CREATININE-BSD FRML MDRD: >90 ML/MIN/{1.73_M2}
GLUCOSE SERPL-MCNC: 114 MG/DL (ref 70–99)
HCT VFR BLD AUTO: 41.6 % (ref 35–47)
HETEROPH AB SER QL: NEGATIVE
HGB BLD-MCNC: 14.1 G/DL (ref 11.7–15.7)
IMM GRANULOCYTES # BLD: 0 10E9/L (ref 0–0.4)
IMM GRANULOCYTES NFR BLD: 0.3 %
INR PPP: 1 (ref 0.86–1.14)
LYMPHOCYTES # BLD AUTO: 1.7 10E9/L (ref 0.8–5.3)
LYMPHOCYTES NFR BLD AUTO: 22.2 %
MCH RBC QN AUTO: 33.3 PG (ref 26.5–33)
MCHC RBC AUTO-ENTMCNC: 33.9 G/DL (ref 31.5–36.5)
MCV RBC AUTO: 98 FL (ref 78–100)
MONOCYTES # BLD AUTO: 0.6 10E9/L (ref 0–1.3)
MONOCYTES NFR BLD AUTO: 7.4 %
NEUTROPHILS # BLD AUTO: 5 10E9/L (ref 1.6–8.3)
NEUTROPHILS NFR BLD AUTO: 66.9 %
NRBC # BLD AUTO: 0 10*3/UL
NRBC BLD AUTO-RTO: 0 /100
PLATELET # BLD AUTO: 268 10E9/L (ref 150–450)
POTASSIUM SERPL-SCNC: 3.2 MMOL/L (ref 3.4–5.3)
PROT SERPL-MCNC: 7 G/DL (ref 6.8–8.8)
RBC # BLD AUTO: 4.23 10E12/L (ref 3.8–5.2)
SODIUM SERPL-SCNC: 139 MMOL/L (ref 133–144)
SPECIMEN SOURCE: NORMAL
WBC # BLD AUTO: 7.5 10E9/L (ref 4–11)

## 2019-12-16 PROCEDURE — 85610 PROTHROMBIN TIME: CPT | Performed by: EMERGENCY MEDICINE

## 2019-12-16 PROCEDURE — 85025 COMPLETE CBC W/AUTO DIFF WBC: CPT | Performed by: EMERGENCY MEDICINE

## 2019-12-16 PROCEDURE — 99284 EMERGENCY DEPT VISIT MOD MDM: CPT | Mod: 25 | Performed by: EMERGENCY MEDICINE

## 2019-12-16 PROCEDURE — 99284 EMERGENCY DEPT VISIT MOD MDM: CPT | Mod: Z6 | Performed by: EMERGENCY MEDICINE

## 2019-12-16 PROCEDURE — 87081 CULTURE SCREEN ONLY: CPT | Performed by: EMERGENCY MEDICINE

## 2019-12-16 PROCEDURE — 80053 COMPREHEN METABOLIC PANEL: CPT | Performed by: EMERGENCY MEDICINE

## 2019-12-16 PROCEDURE — 86308 HETEROPHILE ANTIBODY SCREEN: CPT | Performed by: EMERGENCY MEDICINE

## 2019-12-16 PROCEDURE — 87880 STREP A ASSAY W/OPTIC: CPT | Performed by: EMERGENCY MEDICINE

## 2019-12-16 PROCEDURE — 96360 HYDRATION IV INFUSION INIT: CPT | Performed by: EMERGENCY MEDICINE

## 2019-12-16 PROCEDURE — 25000132 ZZH RX MED GY IP 250 OP 250 PS 637: Performed by: EMERGENCY MEDICINE

## 2019-12-16 PROCEDURE — 25800030 ZZH RX IP 258 OP 636: Performed by: EMERGENCY MEDICINE

## 2019-12-16 PROCEDURE — 86140 C-REACTIVE PROTEIN: CPT | Performed by: EMERGENCY MEDICINE

## 2019-12-16 PROCEDURE — 85652 RBC SED RATE AUTOMATED: CPT | Performed by: EMERGENCY MEDICINE

## 2019-12-16 PROCEDURE — 96361 HYDRATE IV INFUSION ADD-ON: CPT | Performed by: EMERGENCY MEDICINE

## 2019-12-16 RX ORDER — IBUPROFEN 200 MG
600 TABLET ORAL 3 TIMES DAILY
Qty: 63 TABLET | Refills: 0 | Status: SHIPPED | OUTPATIENT
Start: 2019-12-16 | End: 2020-02-27

## 2019-12-16 RX ORDER — ACETAMINOPHEN 500 MG
1000 TABLET ORAL ONCE
Status: COMPLETED | OUTPATIENT
Start: 2019-12-16 | End: 2019-12-16

## 2019-12-16 RX ORDER — IBUPROFEN 600 MG/1
600 TABLET, FILM COATED ORAL ONCE
Status: COMPLETED | OUTPATIENT
Start: 2019-12-16 | End: 2019-12-16

## 2019-12-16 RX ORDER — TRAMADOL HYDROCHLORIDE 50 MG/1
50 TABLET ORAL EVERY 6 HOURS PRN
Qty: 15 TABLET | Refills: 0 | Status: SHIPPED | OUTPATIENT
Start: 2019-12-16 | End: 2020-02-27

## 2019-12-16 RX ADMIN — ACETAMINOPHEN 1000 MG: 500 TABLET ORAL at 13:07

## 2019-12-16 RX ADMIN — SODIUM CHLORIDE 1000 ML: 9 INJECTION, SOLUTION INTRAVENOUS at 13:08

## 2019-12-16 RX ADMIN — IBUPROFEN 600 MG: 600 TABLET ORAL at 13:07

## 2019-12-16 ASSESSMENT — ENCOUNTER SYMPTOMS
VOMITING: 0
SORE THROAT: 1
MYALGIAS: 1
ARTHRALGIAS: 1
DIARRHEA: 0
FEVER: 0
COUGH: 1
NUMBNESS: 1

## 2019-12-16 NOTE — DISCHARGE INSTRUCTIONS
Keep hydrated.    May use Tylenol in addition to ibuprofen for body aches.    Please make an appointment to follow up with Your Primary Care Provider in 4-5 days if not improving.    Return to the ER for worsening or fever.
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
## 2019-12-16 NOTE — ED AVS SNAPSHOT
[facility], [place], Emergency Department  [address]  [place] [address]  Phone:  [phone]  Fax:  [phone]                                    Eliana Anders   MRN: [id number]    Department:  [facility], Emergency Department   Date of Visit:  12/16/2019           After Visit Summary Signature Page    I have received my discharge instructions, and my questions have been answered. I have discussed any challenges I see with this plan with the nurse or doctor.    ..........................................................................................................................................  Patient/Patient Representative Signature      ..........................................................................................................................................  Patient Representative Print Name and Relationship to Patient    ..................................................               ................................................  Date                                   Time    ..........................................................................................................................................  Reviewed by Signature/Title    ...................................................              ..............................................  Date                                               Time          22EPIC Rev 08/18

## 2019-12-16 NOTE — LETTER
December 16, 2019      To Whom It May Concern:      Eliana Taryn Bishnu Anders was seen in our Emergency Department today, 12/16/19.  I expect her condition to improve over the next several days.  She may not return to work/school until 12/20/19.    Sincerely,        Beni Hastings MD, MD

## 2019-12-18 LAB
BACTERIA SPEC CULT: NORMAL
Lab: NORMAL
SPECIMEN SOURCE: NORMAL

## 2019-12-18 NOTE — RESULT ENCOUNTER NOTE
Final Beta strep group A r/o culture is NEGATIVE for Group A streptococcus.    No treatment or change in treatment per [facility] Strep protocol.

## 2019-12-20 ENCOUNTER — OFFICE VISIT (OUTPATIENT)
Dept: FAMILY MEDICINE | Facility: CLINIC | Age: 39
End: 2019-12-20
Payer: COMMERCIAL

## 2019-12-20 VITALS
OXYGEN SATURATION: 97 % | SYSTOLIC BLOOD PRESSURE: 116 MMHG | HEART RATE: 81 BPM | WEIGHT: 195.1 LBS | BODY MASS INDEX: 30.55 KG/M2 | TEMPERATURE: 98.3 F | DIASTOLIC BLOOD PRESSURE: 84 MMHG

## 2019-12-20 DIAGNOSIS — M79.642 HAND PAIN, LEFT: Primary | ICD-10-CM

## 2019-12-20 DIAGNOSIS — R79.82 CRP ELEVATED: ICD-10-CM

## 2019-12-20 DIAGNOSIS — R79.89 ELEVATED LFTS: ICD-10-CM

## 2019-12-20 DIAGNOSIS — E87.8 ELECTROLYTE ABNORMALITY: ICD-10-CM

## 2019-12-20 LAB
ALBUMIN SERPL-MCNC: 3.3 G/DL (ref 3.4–5)
ALP SERPL-CCNC: 268 U/L (ref 40–150)
ALT SERPL W P-5'-P-CCNC: 116 U/L (ref 0–50)
ANION GAP SERPL CALCULATED.3IONS-SCNC: 6 MMOL/L (ref 3–14)
AST SERPL W P-5'-P-CCNC: 48 U/L (ref 0–45)
BILIRUB SERPL-MCNC: 0.2 MG/DL (ref 0.2–1.3)
BUN SERPL-MCNC: 10 MG/DL (ref 7–30)
CALCIUM SERPL-MCNC: 8.7 MG/DL (ref 8.5–10.1)
CHLORIDE SERPL-SCNC: 104 MMOL/L (ref 94–109)
CO2 SERPL-SCNC: 29 MMOL/L (ref 20–32)
CREAT SERPL-MCNC: 0.63 MG/DL (ref 0.52–1.04)
CRP SERPL-MCNC: <2.9 MG/L (ref 0–8)
GFR SERPL CREATININE-BSD FRML MDRD: >90 ML/MIN/{1.73_M2}
GGT SERPL-CCNC: 614 U/L (ref 0–40)
GLUCOSE SERPL-MCNC: 94 MG/DL (ref 70–99)
POTASSIUM SERPL-SCNC: 3.7 MMOL/L (ref 3.4–5.3)
PROT SERPL-MCNC: 6.9 G/DL (ref 6.8–8.8)
SODIUM SERPL-SCNC: 139 MMOL/L (ref 133–144)

## 2019-12-20 PROCEDURE — 86140 C-REACTIVE PROTEIN: CPT | Performed by: NURSE PRACTITIONER

## 2019-12-20 PROCEDURE — 36415 COLL VENOUS BLD VENIPUNCTURE: CPT | Performed by: NURSE PRACTITIONER

## 2019-12-20 PROCEDURE — 99214 OFFICE O/P EST MOD 30 MIN: CPT | Performed by: NURSE PRACTITIONER

## 2019-12-20 PROCEDURE — 80053 COMPREHEN METABOLIC PANEL: CPT | Performed by: NURSE PRACTITIONER

## 2019-12-20 PROCEDURE — 82977 ASSAY OF GGT: CPT | Performed by: NURSE PRACTITIONER

## 2019-12-20 RX ORDER — GABAPENTIN 100 MG/1
CAPSULE ORAL
Qty: 150 CAPSULE | Refills: 1 | Status: SHIPPED | OUTPATIENT
Start: 2019-12-20 | End: 2020-02-27

## 2019-12-20 NOTE — PROGRESS NOTES
Subjective     Eliana Anders is a 39 year old female who presents to clinic today for the following health issues:    HPI   ED/UC Followup:    Facility: [facility] emergency dept.   Date of visit: 12/16/19  Reason for v/isit: Numbness in hands and arm and joint pain of the knee.   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     Description  Location: Left arm and hand.     Intensity:  5.5/10    Accompanying signs and symptoms: numbness and tingling    History  Previous similar problem: no   Previous evaluation:  none    Precipitating or alleviating factors:  Trauma or overuse: no   Aggravating factors include: Sleeping, just randomly started.     Therapies tried and outcome: The medication given in ER- some help. Patient has 2 left of the tramadol.      Reviewed and updated as needed this visit by Provider         Review of Systems   ROS COMP: Constitutional, HEENT, cardiovascular, pulmonary, gi and gu systems are negative, except as otherwise noted.      Objective    /84   Pulse 81   Temp 98.3  F (36.8  C)   Wt 88.5 kg (195 lb 1.6 oz)   LMP 12/06/2019   SpO2 97%   BMI 30.55 kg/m    Body mass index is 30.55 kg/m .  Physical Exam   GENERAL: healthy, alert and no distress  EYES: Eyes grossly normal to inspection, PERRL and conjunctivae and sclerae normal  HENT: ear canals and TM's normal, nose and mouth without ulcers or lesions  NECK: no adenopathy, no asymmetry, masses, or scars and thyroid normal to palpation  RESP: lungs clear to auscultation - no rales, rhonchi or wheezes  CV: regular rate and rhythm, normal S1 S2, no S3 or S4, no murmur, click or rub, no peripheral edema and peripheral pulses  strong  ABDOMEN: soft, nontender, without hepatosplenomegaly or masses and bowel sounds normal  MS: indicates entire hand as affected, left arm normal muscle tone, normal range of motion, no edema, peripheral pulses normal and no tenderness to palpation of hand, wrist, or elbow; spurlings negative; phalen and tinel's negative.  SKIN: no suspicious lesions or rashes  NEURO: Normal strength and tone, mentation intact and speech normal; sensory exam of left arm grossly normal  PSYCH: mentation appears normal, affect normal/bright    Diagnostic Test Results:  Labs reviewed in Epic  Results for orders placed or performed in visit on 12/20/19   Comprehensive metabolic panel     Status: Abnormal   Result Value Ref Range    Sodium 139 133 - 144 mmol/L    Potassium 3.7 3.4 - 5.3 mmol/L    Chloride 104 94 - 109 mmol/L    Carbon Dioxide 29 20 - 32 mmol/L    Anion Gap 6 3 - 14 mmol/L    Glucose 94 70 - 99 mg/dL    Urea Nitrogen 10 7 - 30 mg/dL    Creatinine 0.63 0.52 - 1.04 mg/dL    GFR Estimate >90 >60 mL/min/[1.73_m2]    GFR Estimate If Black >90 >60 mL/min/[1.73_m2]    Calcium 8.7 8.5 - 10.1 mg/dL    Bilirubin Total 0.2 0.2 - 1.3 mg/dL    Albumin 3.3 (L) 3.4 - 5.0 g/dL    Protein Total 6.9 6.8 - 8.8 g/dL    Alkaline Phosphatase 268 (H) 40 - 150 U/L     (H) 0 - 50 U/L    AST 48 (H) 0 - 45 U/L   GGT     Status: Abnormal   Result Value Ref Range     (H) 0 - 40 U/L   CRP, inflammation     Status: None   Result Value Ref Range    CRP Inflammation <2.9 0.0 - 8.0 mg/L           Assessment & Plan     (M79.642) Hand pain, left  (primary encounter diagnosis)  Comment:   Plan: gabapentin (NEURONTIN) 100 MG capsule        Unclear cause, but sensation is improving and patient has no current loss of function.  Consider EMG if not improving or worsening.  Offered gabapentin during recovery period in lieu of continued tramadol.      (E87.8) Electrolyte abnormality  Comment:   Plan: Comprehensive metabolic panel, GGT         Recheck - potassium normal    (R94.5) Elevated LFTs  Comment:   Plan: Comprehensive metabolic panel, GGT        Recheck - continued elevated and positive GGT.  Strongly advise cessation of alcohol.  Return in three months to recheck    (R79.82) CRP elevated  Comment:   Plan: CRP, inflammation        Recheck normal       Tobacco Cessation:   reports that she has been smoking cigarettes and cigars. She started smoking about 21 years ago. She has a 2.75 pack-year smoking history. She has never used smokeless tobacco.  Tobacco Cessation Action Plan: Information offered: Patient not interested at this time          Patient Instructions   Take gabapentin which is better for nerve pain  Take consistently for the next 4-6 weeks  If you still are having the pain and numbness in 6 weeks, please call the clinic to get an EMG order    If you have focal weakness (can't make a part of your body do what you want) go to the ER    Rechecking several labs that were abnormal in the ED  Likely with result will ask that you stop tylenol and alcohol for a month to recheck  Ibuprofen instead of tylenol      Return in about 6 weeks (around 1/31/2020) for if hand symptoms have not resolved.    CARISA Ware [facility]

## 2019-12-20 NOTE — PATIENT INSTRUCTIONS
Take gabapentin which is better for nerve pain  Take consistently for the next 4-6 weeks  If you still are having the pain and numbness in 6 weeks, please call the clinic to get an EMG order    If you have focal weakness (can't make a part of your body do what you want) go to the ER    Rechecking several labs that were abnormal in the ED  Likely with result will ask that you stop tylenol and alcohol for a month to recheck  Ibuprofen instead of tylenol

## 2019-12-26 ENCOUNTER — TELEPHONE (OUTPATIENT)
Dept: FAMILY MEDICINE | Facility: CLINIC | Age: 39
End: 2019-12-26

## 2019-12-26 NOTE — TELEPHONE ENCOUNTER
"Lab results show normal electrolytes and continued liver marker elevations.  The GGT is elevated, too, which means that almost certainly her liver levels are related to alcohol consumption, even if it is not above the \"recommended amount.\"  I strongly advise that she drink no alcohol and take no tylenol for the next month and then return to the clinic to evaluate symptoms and recheck labs.  ALEKSANDER Ocampo   "

## 2019-12-27 NOTE — TELEPHONE ENCOUNTER
Wrote patient on [employer] with providers message. In addition, called patient and spoke over the phone giving the results and car instructions. Susan Grant RN on 12/27/2019 at 8:31 AM  .

## 2020-02-18 DIAGNOSIS — I10 BENIGN ESSENTIAL HYPERTENSION: ICD-10-CM

## 2020-02-18 RX ORDER — AMLODIPINE BESYLATE 5 MG/1
5 TABLET ORAL DAILY
Qty: 90 TABLET | Refills: 1 | Status: SHIPPED | OUTPATIENT
Start: 2020-02-18 | End: 2022-09-14

## 2020-02-18 NOTE — TELEPHONE ENCOUNTER
"Requested Prescriptions   Pending Prescriptions Disp Refills     amLODIPine 5 MG PO tablet 90 tablet 1     Sig: Take 1 tablet (5 mg) by mouth daily  Last Written Prescription Date:  08/16/2019  Last Fill Quantity: 90,  # refills: 1   Last office visit: 12/20/2019 with prescribing provider:  12/20/2019   Future Office Visit:         Calcium Channel Blockers Protocol  Passed - 2/18/2020  4:00 PM        Passed - Blood pressure under 140/90 in past 12 months     BP Readings from Last 3 Encounters:   12/20/19 116/84   12/16/19 114/74   09/17/19 118/78                 Passed - Recent (12 mo) or future (30 days) visit within the authorizing provider's specialty     Patient has had an office visit with the authorizing provider or a provider within the authorizing providers department within the previous 12 mos or has a future within next 30 days. See \"Patient Info\" tab in inbasket, or \"Choose Columns\" in Meds & Orders section of the refill encounter.              Passed - Medication is active on med list        Passed - Patient is age 18 or older        Passed - No active pregnancy on record        Passed - Normal serum creatinine on file in past 12 months     Recent Labs   Lab Test 12/20/19  1601   CR 0.63             Passed - No positive pregnancy test in past 12 months        "

## 2020-02-18 NOTE — TELEPHONE ENCOUNTER
Prescription approved per [facility] [place] Protocol.  Susan Grant RN on 2/18/2020 at 5:55 PM

## 2020-02-25 PROCEDURE — 99285 EMERGENCY DEPT VISIT HI MDM: CPT | Mod: 25 | Performed by: EMERGENCY MEDICINE

## 2020-02-25 PROCEDURE — 99284 EMERGENCY DEPT VISIT MOD MDM: CPT | Mod: Z6 | Performed by: EMERGENCY MEDICINE

## 2020-02-26 ENCOUNTER — HOSPITAL ENCOUNTER (EMERGENCY)
Facility: CLINIC | Age: 40
Discharge: HOME | End: 2020-02-26
Attending: EMERGENCY MEDICINE | Admitting: EMERGENCY MEDICINE
Payer: OTHER MISCELLANEOUS

## 2020-02-26 ENCOUNTER — APPOINTMENT (OUTPATIENT)
Dept: CT IMAGING | Facility: CLINIC | Age: 40
End: 2020-02-26
Attending: EMERGENCY MEDICINE
Payer: OTHER MISCELLANEOUS

## 2020-02-26 VITALS
DIASTOLIC BLOOD PRESSURE: 75 MMHG | BODY MASS INDEX: 31.61 KG/M2 | RESPIRATION RATE: 16 BRPM | OXYGEN SATURATION: 95 % | HEART RATE: 69 BPM | TEMPERATURE: 98.2 F | WEIGHT: 201.9 LBS | SYSTOLIC BLOOD PRESSURE: 115 MMHG

## 2020-02-26 DIAGNOSIS — M54.2 NECK PAIN: ICD-10-CM

## 2020-02-26 DIAGNOSIS — S06.0X0A CLOSED HEAD INJURY WITH CONCUSSION, WITHOUT LOSS OF CONSCIOUSNESS, INITIAL ENCOUNTER: ICD-10-CM

## 2020-02-26 LAB
HCG UR QL: NEGATIVE
INTERNAL QC OK POCT: NO

## 2020-02-26 PROCEDURE — 72125 CT NECK SPINE W/O DYE: CPT

## 2020-02-26 PROCEDURE — 25000128 H RX IP 250 OP 636: Performed by: EMERGENCY MEDICINE

## 2020-02-26 PROCEDURE — 70450 CT HEAD/BRAIN W/O DYE: CPT

## 2020-02-26 PROCEDURE — 25000132 ZZH RX MED GY IP 250 OP 250 PS 637: Performed by: EMERGENCY MEDICINE

## 2020-02-26 PROCEDURE — 81025 URINE PREGNANCY TEST: CPT | Performed by: EMERGENCY MEDICINE

## 2020-02-26 RX ORDER — ONDANSETRON 4 MG/1
4 TABLET, ORALLY DISINTEGRATING ORAL ONCE
Status: COMPLETED | OUTPATIENT
Start: 2020-02-26 | End: 2020-02-26

## 2020-02-26 RX ORDER — OXYCODONE HYDROCHLORIDE 5 MG/1
5 TABLET ORAL ONCE
Status: COMPLETED | OUTPATIENT
Start: 2020-02-26 | End: 2020-02-26

## 2020-02-26 RX ORDER — ACETAMINOPHEN 325 MG/1
650 TABLET ORAL ONCE
Status: COMPLETED | OUTPATIENT
Start: 2020-02-26 | End: 2020-02-26

## 2020-02-26 RX ADMIN — ONDANSETRON 4 MG: 4 TABLET, ORALLY DISINTEGRATING ORAL at 00:19

## 2020-02-26 RX ADMIN — ACETAMINOPHEN 650 MG: 325 TABLET, FILM COATED ORAL at 01:20

## 2020-02-26 RX ADMIN — OXYCODONE HYDROCHLORIDE 5 MG: 5 TABLET ORAL at 01:20

## 2020-02-26 NOTE — ED PROVIDER NOTES
"  History     Chief Complaint   Patient presents with     Fall     Fell on ice, hit the back of her head yesterday around 1 pm. Reports nausea and vomited x1 today. Reports increased fatigue and slept most of today. Pt reports neck pain and pain upon palpation, c-collar applied.      HPI  Eliana Anders is a 40 year old female with PMH notable for HTN, obesity who presents to the ED with occipital head injury. Injury was 1.5 days ago. Patient reports she fell backward and struck the back of her head. She felt dazed but did not lose consciousness. Since then, she has felt a bit \"off\" and had nausea. She had one episode of vomiting. No abdominal pain. She has been more tired and slept through this past morning. She endorses headache and bilateral posterior neck pain since the fall, denies other areas of pain.     I have reviewed the Medications, Allergies, Past Medical and Surgical History, and Social History in the Epic system.    Review of Systems  A complete review of systems was performed with pertinent positives and negatives noted in the HPI, and all other systems negative.     Physical Exam   BP: 139/82  Pulse: 76  Temp: 98.2  F (36.8  C)  Resp: 16  Weight: 91.6 kg (201 lb 14.4 oz)  SpO2: 96 %    Physical Exam  General: no acute distress. Appears stated age.   HENT: MMM, no oropharyngeal lesions  Eyes: PERRL, normal sclerae  Neck: tender bilateral paraspinal > midline, supple  Cardio: regular rate. Regular rhythm. Extremities well perfused  Resp: Normal work of breathing, clear breath sounds  Chest/Back: no visual signs of trauma, no midline lumbar nor thoracic tenderness  Abdomen: no tenderness, non-distended, no rebound, no guarding  Neuro: alert and fully oriented. No confusion. CN II-XII intact to testing. Strength left: 5/5 , 5/5 elbow flexion, 5/5 elbow extension, 5/5 shoulder abduction, 5/5 hip flexion, 5/5 knee flexion, 5/5 knee extension. Strength right: 5/5 , 5/5 elbow flexion, " 5/5 elbow extension, 5/5 shoulder abduction, 5/5 hip flexion, 5/5 knee flexion, 5/5 knee extension. Sensation intact to soft touch in all extremities. No pronator drift. Normal tone, no clonus. Normal coordination with finger-nose. Normal gait.   MSK: no deformities.   Integumentary/Skin: no rash visualized, normal color  Psych: normal affect, normal behavior    ED Course      Procedures           Labs Ordered and Resulted from Time of ED Arrival Up to the Time of Departure from the ED   HCG QUAL URINE POCT - Abnormal     Head CT w/o contrast   Final Result   IMPRESSION:   1.  No acute intracranial process.   2.  Additional findings above.      Cervical spine CT w/o contrast   Final Result   IMPRESSION:   1.  No acute osseous abnormality identified.   2.  Degenerative changes described above.   3.  Additional findings above.             Assessments & Plan (with Medical Decision Making)   Patient presenting with headache, vomiting, and neck pain after occipital head injury. Vitals in the ED unremarkable. Nursing notes reviewed. Initial differential diagnosis includes but not limited to concussion, SDH, SAH, concussion, cervical spine fracture, cervical paraspinal spasm.     C-collar was placed pending imaging. CT head and C-spine without visualized traumatic pathology - no intracranial hemorrhage, no skull nor cervical spine fracture. It did show chronic cervical degenerative changes.     The complete clinical picture is most consistent with concussion and neck muscle strain. After counseling on the diagnosis, work-up, and treatment plan, the patient was discharged to home. Work note provided and concussion symptom control strategies discussed. The patient was advised to follow-up with her PCP in a few days, and advised she may need concussion/TBI clinic referral if symptoms persist. The patient was advised to return to the ED if worsening symptoms, or if there are any urgent/life-threatening concerns.     Final  diagnoses:   Closed head injury with concussion, without loss of consciousness, initial encounter   Neck pain     Discharge Medication List as of 2/26/2020  2:25 AM          --  Germán Yeung MD   Emergency Medicine   [facility], [place], EMERGENCY DEPARTMENT  2/25/2020     Germán Yeung MD  02/26/20 0603

## 2020-02-26 NOTE — LETTER
February 26, 2020      To Whom It May Concern:      Eliana Tarynledy Anders was seen in our Emergency Department overnight, 02/25-26/20.  I expect her condition to improve over the next 4-5 days.  She may return to work/school with restrictions: light activity, ability to sit down if having dizziness symptoms, limited screen time.    Sincerely,        Germán Yeung MD  Emergency Medicine

## 2020-02-26 NOTE — DISCHARGE INSTRUCTIONS
Please make an appointment to follow up with Your Primary Care Provider (Martha Davis [phone]) in 2-5 days. If symptoms persist, you may need a referral to concussion clinic.      Return to the ED if you are having worsening symptoms, or any urgent/life-threatening concerns.

## 2020-02-26 NOTE — ED AVS SNAPSHOT
[facility], Portal, Emergency Department  [address]  [facility] [address]  Phone:  [phone]  Fax:  [phone]                                    Eliana Anders   MRN: [id number]    Department:  [facility], Emergency Department   Date of Visit:  2/25/2020           After Visit Summary Signature Page    I have received my discharge instructions, and my questions have been answered. I have discussed any challenges I see with this plan with the nurse or doctor.    ..........................................................................................................................................  Patient/Patient Representative Signature      ..........................................................................................................................................  Patient Representative Print Name and Relationship to Patient    ..................................................               ................................................  Date                                   Time    ..........................................................................................................................................  Reviewed by Signature/Title    ...................................................              ..............................................  Date                                               Time          22EPIC Rev 08/18

## 2020-02-27 ENCOUNTER — OFFICE VISIT (OUTPATIENT)
Dept: FAMILY MEDICINE | Facility: CLINIC | Age: 40
End: 2020-02-27
Payer: OTHER MISCELLANEOUS

## 2020-02-27 VITALS
SYSTOLIC BLOOD PRESSURE: 118 MMHG | HEIGHT: 69 IN | DIASTOLIC BLOOD PRESSURE: 80 MMHG | BODY MASS INDEX: 29.8 KG/M2 | OXYGEN SATURATION: 96 % | TEMPERATURE: 98.7 F | WEIGHT: 201.2 LBS | RESPIRATION RATE: 18 BRPM | HEART RATE: 76 BPM

## 2020-02-27 DIAGNOSIS — M54.2 CERVICALGIA: ICD-10-CM

## 2020-02-27 DIAGNOSIS — F07.81 POSTCONCUSSION SYNDROME: Primary | ICD-10-CM

## 2020-02-27 DIAGNOSIS — Z71.89 OTHER SPECIFIED COUNSELING: ICD-10-CM

## 2020-02-27 PROCEDURE — 99214 OFFICE O/P EST MOD 30 MIN: CPT | Performed by: PHYSICIAN ASSISTANT

## 2020-02-27 ASSESSMENT — MIFFLIN-ST. JEOR: SCORE: 1649.14

## 2020-02-27 NOTE — PROGRESS NOTES
"Subjective     Eliana Taryniris Anders is a 40 year old female who presents to clinic today for the following health issues:    HPI   ED/UC Followup:    Facility:  [facility], Emergency Department  Date of visit: 2-  Reason for visit: Closed head injury with concussion-from a fall  Current Status: her neck and head are still hurting, feeling tired.     Fell on the ice and hit her head. No LOC  Today her neck and shoulders hurt  Feels \"like I'm in [place].\"  Had a CT scan in the ER was normal yesterday. No cervical fracture    Has a mild headache over the area she hit her head  Stiffness in the neck and shoulders  No numbness or tingling in the hands or arms  She feels tired. Wants to sleep all day  No visual changes  Feels a little wobley  Having some difficulty concentrating  She threw up last night        Problem list and histories reviewed & adjusted, as indicated.  Additional history: as documented    ROS:  CONSTITUTIONAL: NEGATIVE for fever, chills, change in weight  EYES: NEGATIVE for vision changes or irritation  ENT/MOUTH: NEGATIVE for ear, mouth and throat problems  RESP: NEGATIVE for significant cough or SOB  CV: NEGATIVE for chest pain, palpitations or peripheral edema  GI: NEGATIVE for nausea, abdominal pain, heartburn, or change in bowel habits  MUSCULOSKELETAL: NEGATIVE for muscle spasm  and paresthesias  NEURO: NEGATIVE for behavior changes, dysesthesias, loss of consciousness, paralysis , syncope and vertigo  PSYCHIATRIC: NEGATIVE for changes in mood or affect    Patient Active Problem List   Diagnosis     Surveillance of previously prescribed intrauterine contraceptive device     CARDIOVASCULAR SCREENING; LDL GOAL LESS THAN 160     ACL tear     Acute meniscal tear of right knee, initial encounter     Acquired defect of articular cartilage     Other orthopedic aftercare(V54.89)     Abnormal gait     Right knee pain     Costochondral chest pain     Pain in thoracic spine     " Elevated blood pressure reading without diagnosis of hypertension     Family history of cerebrovascular accident in sister     Pulmonary lesion, right     Class 1 obesity with body mass index (BMI) of 30.0 to 30.9 in adult     Closed fracture of sternum, unspecified portion of sternum, initial encounter     Essential hypertension     Chronic nonseasonal allergic rhinitis due to other allergen     Abscess of Bartholin's gland     Past Surgical History:   Procedure Laterality Date     ARTHROSCOPIC RECONSTRUCTION ANTERIOR CRUCIATE LIGAMENT Right 2015    Procedure: ARTHROSCOPIC RECONSTRUCTION ANTERIOR CRUCIATE LIGAMENT;  Surgeon: Tevin Cordero MD;  [facility]     ARTHROSCOPY KNEE WITH MENISCAL REPAIR Right 2015    Procedure: ARTHROSCOPY KNEE WITH MENISCAL REPAIR;  Surgeon: Tevin Cordero MD;  [facility]     C NONSPECIFIC PROCEDURE      rt ankle repair     HC REMOVAL OF OVARIAN CYST(S)       LAPAROSCOPIC CHOLECYSTECTOMY       MARSUPIALIZATION BARTHOLIN CYST Bilateral 2019    Procedure: Marsupialization Of Bilateral Bartholin Gland Cysts;  Surgeon: Amara Thakur MD;  [facility]     ORTHOPEDIC SURGERY         Social History     Tobacco Use     Smoking status: Current Some Day Smoker     Packs/day: 0.25     Years: 11.00     Pack years: 2.75     Types: Cigarettes, Cigars     Start date: 10/13/1998     Last attempt to quit: 10/13/2017     Years since quittin.3     Smokeless tobacco: Never Used     Tobacco comment: quit with preg   Substance Use Topics     Alcohol use: Yes     Comment: every 2 week     Family History   Problem Relation Age of Onset     C.A.D. Mother         MI & CHF     Cancer Maternal Grandmother      Hypertension Paternal Grandmother      Cancer Paternal Grandmother         ? cervical     Cancer Paternal Aunt         ? cervical     Liver Disease No family hx of            Patient Active Problem List   Diagnosis      Surveillance of previously prescribed intrauterine contraceptive device     CARDIOVASCULAR SCREENING; LDL GOAL LESS THAN 160     ACL tear     Acute meniscal tear of right knee, initial encounter     Acquired defect of articular cartilage     Other orthopedic aftercare(V54.89)     Abnormal gait     Right knee pain     Costochondral chest pain     Pain in thoracic spine     Elevated blood pressure reading without diagnosis of hypertension     Family history of cerebrovascular accident in sister     Pulmonary lesion, right     Class 1 obesity with body mass index (BMI) of 30.0 to 30.9 in adult     Closed fracture of sternum, unspecified portion of sternum, initial encounter     Essential hypertension     Chronic nonseasonal allergic rhinitis due to other allergen     Abscess of Bartholin's gland     Past Surgical History:   Procedure Laterality Date     ARTHROSCOPIC RECONSTRUCTION ANTERIOR CRUCIATE LIGAMENT Right 2015    Procedure: ARTHROSCOPIC RECONSTRUCTION ANTERIOR CRUCIATE LIGAMENT;  Surgeon: Tevin Cordero MD;  [facility]     ARTHROSCOPY KNEE WITH MENISCAL REPAIR Right 2015    Procedure: ARTHROSCOPY KNEE WITH MENISCAL REPAIR;  Surgeon: Tevin Cordero MD;  [facility]     C NONSPECIFIC PROCEDURE      rt ankle repair     HC REMOVAL OF OVARIAN CYST(S)       LAPAROSCOPIC CHOLECYSTECTOMY       MARSUPIALIZATION BARTHOLIN CYST Bilateral 2019    Procedure: Marsupialization Of Bilateral Bartholin Gland Cysts;  Surgeon: mAara Thakur MD;  [facility]     ORTHOPEDIC SURGERY         Social History     Tobacco Use     Smoking status: Current Some Day Smoker     Packs/day: 0.25     Years: 11.00     Pack years: 2.75     Types: Cigarettes, Cigars     Start date: 10/13/1998     Last attempt to quit: 10/13/2017     Years since quittin.3     Smokeless tobacco: Never Used     Tobacco comment: quit with preg   Substance Use Topics     Alcohol use: Yes  "    Comment: every 2 week     Family History   Problem Relation Age of Onset     C.A.D. Mother         MI & CHF     Cancer Maternal Grandmother      Hypertension Paternal Grandmother      Cancer Paternal Grandmother         ? cervical     Cancer Paternal Aunt         ? cervical     Liver Disease No family hx of          Current Outpatient Medications   Medication Sig Dispense Refill     amLODIPine 5 MG PO tablet Take 1 tablet (5 mg) by mouth daily 90 tablet 1     diphenhydrAMINE (BENADRYL) 25 MG tablet Take 25 mg by mouth every 6 hours as needed for itching or allergies       fluticasone (FLONASE) 50 MCG/ACT spray Spray 1-2 sprays into both nostrils daily 1 Bottle 11     levonorgestrel (MIRENA) 20 MCG/24HR IUD 1 each by Intrauterine route once         OBJECTIVE:                                                    /80   Pulse 76   Temp 98.7  F (37.1  C) (Oral)   Resp 18   Ht 1.756 m (5' 9.13\")   Wt 91.3 kg (201 lb 3.2 oz)   SpO2 96%   BMI 29.60 kg/m   Body mass index is 29.6 kg/m .   GEN: A&Ox4, NAD  Eyes: PERRLA, EOM's intact. No injection  EARS: External ear normal. Canal clear. TM normal  Face: no tenderness  Nose: no discharge, mucosal edema or injection  ORAL CAVITY/OROPHARYNX: No lesions to the lips, gums, or buccal mucosa. The mucosa is moist, no oral lesions or masses. Tongue is normal. No postnasal drip noted, no erythema or discharge.   NECK:supple, symmetric, no masses, normal without adenopathy, trachea is midline, thyroid not enlarged, full ROM. with TTP over cervical muscles with hypertonicity. The carotid pulses are full without bruit. There is no jugular venous distention.  NEUROLOGICALCRANIAL NERVES: , II - XII are normal, REFLEXES, DTRs are 1 - 2+ and symmetric in upper and lower extremities, PATHOLOGIC REFLEXES, grossly negative , SENSATION:, Grossly normal and symmetric, MOTOR FUNCTION:, Grossly normal strength , BALANCE AND GAIT , grossly normal , Rhomberg is negative, Finger to nose " testing is normal, CORTICAL FUNCTION:, normal intellectual function, normal memory, good judgment, oriented x 3   LUNGS:  Respiratory effort is normal without respiratory distress. The lungs are clear to auscultation.   HEART:  The heart is regular without murmur lifts, thrills, or heaves. No S3 or S4 is present. There is no jugular venous distention.   GAIT:normal including tandem walk, heel and toe walk.  Mini Mental Status Exam: normal    No results found for this or any previous visit (from the past 24 hour(s)).       ASSESSMENT/PLAN:                                                        ICD-10-CM    1. Postconcussion syndrome F07.81    2. Cervicalgia M54.2      Note off work. Recheck Tuesday.     Patient Instructions     Ibuprofen 600 mg up to 4 times daily as needed, eat with a meal  Ok to take pepcid 20 mg twice daily as needed for stomach  gentle stretching  Lots of rest. Come back and see me on Tuesday  Return to clinic for any new or worsening symptoms or go to ER Urgent care in off hours     Patient Education     After a Concussion     Awaken to check alertness as often as the health care provider suggests.     If you had a mild concussion (a head injury), watch closely for signs of problems during the first 48 hours after the injury. Follow the doctor s advice about recovering at home. Use the tips on this handout as a guide.  Note: You should not be left alone after a concussion. If no adult can stay with the injured person, let the doctor know.  Have someone call 911 or your emergency number if you can't fully wake up or have a seizures or convulsions.  The first 48 hours  Don t take medicine unless approved by your healthcare provider. Try placing a cold, damp cloth on your head to help relieve a headache.    Ask the doctor before using any medicines.    Don't drink alcohol or take sedatives or medicines that make you sleepy.    Don't return to sports or any activity that could cause you to hit your  "head until all symptoms are gone and you have been cleared by your doctor. A second head injury before fully recovering from the first one can lead to serious brain injury.    Don't do activities that need a lot of concentration or a lot of attention. This will allow your brain to rest and heal more quickly.    Return to regular physical and mental activity as directed and approved by your healthcare provider.  Tips about sleeping  For the first day or two, it may be best not to sleep for long periods of time without being checked for alertness. Follow the doctor s instructions.  ? Have someone wake you every ____ hours for the next ____ hours. He or she should ask you questions to check for alertness.  ? OK to sleep through the night.  When to call the healthcare provider  If you notice any of the following, call the healthcare provider:    Vomiting. Some vomiting is common, but tell the provider about any vomiting.    Clear or bloody drainage from the nose or ear    Constant drowsiness or difficulty in waking up    Confusion or memory loss    Blurred vision or any vision changes    Inability to walk or talk normally    Increased weakness or problems with coordination    Constant, unrelieved headache that becomes more severe    Changes in behavior or personality    High-pitched crying in infants    Signs of stroke such as paralysis of parts of the body    Uncrontrolled movements suggesting a seizure  Date Last Reviewed: 12/1/2017 2000-2019 [employer]. [address] [place] [address]. All rights reserved. This information is not intended as a substitute for professional medical care. Always follow your healthcare professional's instructions.                 Estimated body mass index is 29.6 kg/m  as calculated from the following:    Height as of this encounter: 1.756 m (5' 9.13\").    Weight as of this encounter: 91.3 kg (201 lb 3.2 oz).       Anuradha Foreman [facility] " [place]

## 2020-02-27 NOTE — PATIENT INSTRUCTIONS
Ibuprofen 600 mg up to 4 times daily as needed, eat with a meal  Ok to take pepcid 20 mg twice daily as needed for stomach  gentle stretching  Lots of rest. Come back and see me on Tuesday  Return to clinic for any new or worsening symptoms or go to ER Urgent care in off hours     Patient Education     After a Concussion     Awaken to check alertness as often as the health care provider suggests.     If you had a mild concussion (a head injury), watch closely for signs of problems during the first 48 hours after the injury. Follow the doctor s advice about recovering at home. Use the tips on this handout as a guide.  Note: You should not be left alone after a concussion. If no adult can stay with the injured person, let the doctor know.  Have someone call 911 or your emergency number if you can't fully wake up or have a seizures or convulsions.  The first 48 hours  Don t take medicine unless approved by your healthcare provider. Try placing a cold, damp cloth on your head to help relieve a headache.    Ask the doctor before using any medicines.    Don't drink alcohol or take sedatives or medicines that make you sleepy.    Don't return to sports or any activity that could cause you to hit your head until all symptoms are gone and you have been cleared by your doctor. A second head injury before fully recovering from the first one can lead to serious brain injury.    Don't do activities that need a lot of concentration or a lot of attention. This will allow your brain to rest and heal more quickly.    Return to regular physical and mental activity as directed and approved by your healthcare provider.  Tips about sleeping  For the first day or two, it may be best not to sleep for long periods of time without being checked for alertness. Follow the doctor s instructions.  ? Have someone wake you every ____ hours for the next ____ hours. He or she should ask you questions to check for alertness.  ? OK to sleep through  the night.  When to call the healthcare provider  If you notice any of the following, call the healthcare provider:    Vomiting. Some vomiting is common, but tell the provider about any vomiting.    Clear or bloody drainage from the nose or ear    Constant drowsiness or difficulty in waking up    Confusion or memory loss    Blurred vision or any vision changes    Inability to walk or talk normally    Increased weakness or problems with coordination    Constant, unrelieved headache that becomes more severe    Changes in behavior or personality    High-pitched crying in infants    Signs of stroke such as paralysis of parts of the body    Uncrontrolled movements suggesting a seizure  Date Last Reviewed: 12/1/2017 2000-2019 [employer]. [address] [place] [address]. All rights reserved. This information is not intended as a substitute for professional medical care. Always follow your healthcare professional's instructions.

## 2020-02-28 ENCOUNTER — PATIENT OUTREACH (OUTPATIENT)
Dept: CARE COORDINATION | Facility: CLINIC | Age: 40
End: 2020-02-28

## 2020-02-28 NOTE — PROGRESS NOTES
The clinic Community Health Worker spoke with the patient today due to ED/Hospital Discharge to discuss possible Clinic Care Coordination enrollment.  The service was described to the patient and immediate needs were discussed.  The patient declined enrollment at this time.  The PCP is encouraged to refer  in the future if the patient's needs change.    Pt asked if I could help her schedule her f/u appointment for early next week. I told her I would have someone call to help her get that scheduled. Pt thanked me for calling. She has my contact information if she needs anything.    Routing to reception for help scheduling pt for her follow up appointment.

## 2020-03-03 ENCOUNTER — OFFICE VISIT (OUTPATIENT)
Dept: FAMILY MEDICINE | Facility: CLINIC | Age: 40
End: 2020-03-03
Payer: OTHER MISCELLANEOUS

## 2020-03-03 VITALS
DIASTOLIC BLOOD PRESSURE: 88 MMHG | TEMPERATURE: 98.4 F | SYSTOLIC BLOOD PRESSURE: 122 MMHG | OXYGEN SATURATION: 99 % | HEART RATE: 88 BPM

## 2020-03-03 DIAGNOSIS — F07.81 POSTCONCUSSION SYNDROME: Primary | ICD-10-CM

## 2020-03-03 DIAGNOSIS — J06.9 VIRAL URI: ICD-10-CM

## 2020-03-03 DIAGNOSIS — M54.2 CERVICALGIA: ICD-10-CM

## 2020-03-03 PROCEDURE — 99214 OFFICE O/P EST MOD 30 MIN: CPT | Performed by: PHYSICIAN ASSISTANT

## 2020-03-03 NOTE — LETTER
March 3, 2020      Eliana Goetz Chago  [address]    [facility] [address]        To Whom It May Concern:    Eliana Anders  was seen on 3/3/2020.  Please excuse her  until 3/17/2020 due to injury.        Sincerely,        Anuradha Ku PA-C

## 2020-03-03 NOTE — PATIENT INSTRUCTIONS
Over the counter mucinex and zyrtec for your illness  Continue ibuprofen as needed  Recheck in 2 weeks  Return to clinic for any new or worsening symptoms or go to ER Urgent care in off hours

## 2020-03-03 NOTE — PROGRESS NOTES
Subjective     Eliana Anders is a 40 year old female who presents to clinic today for the following health issues:    HPI   Concern - Follow up from 2/27/2020 concussion.   Onset: 2/26/2020    Description:  Slip and fall on the ice, hit head. Patient has been laying low since her last visit.     Intensity: mild    Progression of Symptoms:  improving    Accompanying Signs & Symptoms:  Headaches when focusing, light sensitive.     Previous history of similar problem:   NA    Precipitating factors:   Worsened by: the slip and fall.     Alleviating factors:  Improved by: Rest, low lights. Inactivity    Therapies Tried and outcome: See above.     Still with difficulty concentrating  Slept all weekend  Started feeling like she's coming down with a cold now as of yesterday  headache as of yesterday with the sinus pressure from her cold  Also feels a little unstable  Still tired    Neck pain is much better. She can move her head around without difficulty now        Problem list and histories reviewed & adjusted, as indicated.  Additional history: as documented    ROS:  C: NEGATIVE for fever, chills, change in weight  R: NEGATIVE for significant cough or SOB  CV: NEGATIVE for chest pain, palpitations or peripheral edema  NEURO: NEGATIVE for dysphagia, loss of consciousness, numbness or tingling , paralysis , syncope and vertigo    Patient Active Problem List   Diagnosis     Surveillance of previously prescribed intrauterine contraceptive device     CARDIOVASCULAR SCREENING; LDL GOAL LESS THAN 160     ACL tear     Acute meniscal tear of right knee, initial encounter     Acquired defect of articular cartilage     Other orthopedic aftercare(V54.89)     Abnormal gait     Right knee pain     Costochondral chest pain     Pain in thoracic spine     Elevated blood pressure reading without diagnosis of hypertension     Family history of cerebrovascular accident in sister     Pulmonary lesion, right     Class 1  obesity with body mass index (BMI) of 30.0 to 30.9 in adult     Closed fracture of sternum, unspecified portion of sternum, initial encounter     Essential hypertension     Chronic nonseasonal allergic rhinitis due to other allergen     Abscess of Bartholin's gland     Past Surgical History:   Procedure Laterality Date     ARTHROSCOPIC RECONSTRUCTION ANTERIOR CRUCIATE LIGAMENT Right 2015    Procedure: ARTHROSCOPIC RECONSTRUCTION ANTERIOR CRUCIATE LIGAMENT;  Surgeon: Tevin Cordero MD;  [facility]     ARTHROSCOPY KNEE WITH MENISCAL REPAIR Right 2015    Procedure: ARTHROSCOPY KNEE WITH MENISCAL REPAIR;  Surgeon: Tevin Cordero MD;  [facility]     C NONSPECIFIC PROCEDURE      rt ankle repair     HC REMOVAL OF OVARIAN CYST(S)       LAPAROSCOPIC CHOLECYSTECTOMY       MARSUPIALIZATION BARTHOLIN CYST Bilateral 2019    Procedure: Marsupialization Of Bilateral Bartholin Gland Cysts;  Surgeon: Amara Thakur MD;  [facility]     ORTHOPEDIC SURGERY         Social History     Tobacco Use     Smoking status: Current Some Day Smoker     Packs/day: 0.25     Years: 11.00     Pack years: 2.75     Types: Cigarettes, Cigars     Start date: 10/13/1998     Last attempt to quit: 10/13/2017     Years since quittin.3     Smokeless tobacco: Never Used     Tobacco comment: quit with preg   Substance Use Topics     Alcohol use: Yes     Comment: every 2 week     Family History   Problem Relation Age of Onset     C.A.D. Mother         MI & CHF     Cancer Maternal Grandmother      Hypertension Paternal Grandmother      Cancer Paternal Grandmother         ? cervical     Cancer Paternal Aunt         ? cervical     Liver Disease No family hx of            Problem list, Medication list, Allergies, and Medical/Social/Surgical histories [facility] and updated as appropriate.    OBJECTIVE:                                                    /88   Pulse 88   " Temp 98.4  F (36.9  C) (Oral)   SpO2 99%  There is no height or weight on file to calculate BMI.   GEN: alert, oriented, in no distress. Wearing dark glasses  SKIN: Without rashes or petechiae.  HEENT: PERRL.  EOM's intact  Maxillary sinuses mildly tender bilaterally. Oropharynx hydrated without erythema, edema or exudates  NECK: normal to inspection and palpation with full range of motion. NO lymphadenopathy. No nuchal rigidity.  CV: regular rate and rhythm without murmurs, rubs or gallops  CHEST: clear to percussion and auscultation  NEURO:      Cranial Nerves: WNL     Motor: strength normal and symmetrical.    Sensory: normal to light touch.    DTRs: normal and symmetrical.     Romberg: negative (normal).    Gait: normal.     Heel to Toe: much more stable than last week but still a bit unsteady        No results found for this or any previous visit (from the past 24 hour(s)).       ASSESSMENT/PLAN:                                                        ICD-10-CM    1. Postconcussion syndrome F07.81    2. Cervicalgia M54.2    3. Viral URI J06.9      Much improved but still needs some time to rest. Likely another 2 weeks to full recovery. Recheck sooner if feeling like she can return to work sooner. Return to clinic for any new or worsening symptoms or go to ER Urgent care in off hours     Patient Instructions   Over the counter mucinex and zyrtec for your illness  Continue ibuprofen as needed  Recheck in 2 weeks  Return to clinic for any new or worsening symptoms or go to ER Urgent care in off hours           Estimated body mass index is 29.6 kg/m  as calculated from the following:    Height as of 2/27/20: 1.756 m (5' 9.13\").    Weight as of 2/27/20: 91.3 kg (201 lb 3.2 oz).       Anuradha Foreman [facility]      "

## 2020-03-17 ENCOUNTER — OFFICE VISIT (OUTPATIENT)
Dept: FAMILY MEDICINE | Facility: CLINIC | Age: 40
End: 2020-03-17
Payer: OTHER MISCELLANEOUS

## 2020-03-17 VITALS
DIASTOLIC BLOOD PRESSURE: 84 MMHG | TEMPERATURE: 97.9 F | BODY MASS INDEX: 29.62 KG/M2 | WEIGHT: 200 LBS | RESPIRATION RATE: 16 BRPM | HEART RATE: 87 BPM | HEIGHT: 69 IN | SYSTOLIC BLOOD PRESSURE: 130 MMHG | OXYGEN SATURATION: 100 %

## 2020-03-17 DIAGNOSIS — F07.81 POST CONCUSSIVE SYNDROME: Primary | ICD-10-CM

## 2020-03-17 PROCEDURE — 99214 OFFICE O/P EST MOD 30 MIN: CPT | Performed by: PHYSICIAN ASSISTANT

## 2020-03-17 ASSESSMENT — MIFFLIN-ST. JEOR: SCORE: 1641.57

## 2020-03-17 NOTE — LETTER
[facility]  [address] [place] [address]  [address]  [facility] [address]  Phone: [phone]  Fax: [phone]    March 17, 2020        Eliana Anders  [address]    [facility] [address]          To whom it may concern:    RE: Eliana Centeno Bishnu Anders    Eliana will need to be off work until March 24th due to injury. We will reassess at that time whether she can go back part time to start.     Please contact me for questions or concerns.      Sincerely,        Anuradha Ku PA-C

## 2020-03-17 NOTE — PROGRESS NOTES
Subjective     Eliana Anders is a 40 year old female who presents to clinic today for the following health issues:    HPI   Concern - Follow up from 2/27/2020 concussion.   Onset: 2/24/2020    Description:  Slip and fall on the ice. Having headaches now, its getting worst. Also having a lot of stress with everything going on.     Intensity: serve     Progression of Symptoms:  improving    Accompanying Signs & Symptoms:  Headaches when focusing, light sensitive.     Previous history of similar problem:   NA    Precipitating factors:   Worsened by: looking at screens, light, being out     Alleviating factors:  Improved by: Rest, low lights. Inactivity     Therapies Tried and outcome: See above.         Neck pain is much better. She can move her head around without difficulty now, doing much better    Still with headaches looking at computer screens    She's been able to get out and walk. Up to 20 min    Still needs to keep sunglasses on because of light irritation    Overall feels 50% better    No more neck pain.     Sleeping well. Getting about 8+ hours a night        Problem list and histories reviewed & adjusted, as indicated.  Additional history: as documented    ROS:  CONSTITUTIONAL: NEGATIVE for fever, chills, change in weight  ENT/MOUTH: NEGATIVE for ear, mouth and throat problems  RESP: NEGATIVE for significant cough or SOB  CV: NEGATIVE for chest pain, palpitations or peripheral edema  MUSCULOSKELETAL: NEGATIVE for significant arthralgias or myalgia  NEURO: NEGATIVE for behavior changes, dysphagia, loss of consciousness, numbness or tingling , paralysis , syncope and vertigo  PSYCHIATRIC: NEGATIVE for changes in mood or affect    Patient Active Problem List   Diagnosis     Surveillance of previously prescribed intrauterine contraceptive device     CARDIOVASCULAR SCREENING; LDL GOAL LESS THAN 160     ACL tear     Acute meniscal tear of right knee, initial encounter     Acquired defect of  articular cartilage     Other orthopedic aftercare(V54.89)     Abnormal gait     Right knee pain     Costochondral chest pain     Pain in thoracic spine     Elevated blood pressure reading without diagnosis of hypertension     Family history of cerebrovascular accident in sister     Pulmonary lesion, right     Class 1 obesity with body mass index (BMI) of 30.0 to 30.9 in adult     Closed fracture of sternum, unspecified portion of sternum, initial encounter     Essential hypertension     Chronic nonseasonal allergic rhinitis due to other allergen     Abscess of Bartholin's gland     Past Surgical History:   Procedure Laterality Date     ARTHROSCOPIC RECONSTRUCTION ANTERIOR CRUCIATE LIGAMENT Right 2015    Procedure: ARTHROSCOPIC RECONSTRUCTION ANTERIOR CRUCIATE LIGAMENT;  Surgeon: Tevin Cordero MD;  [facility]     ARTHROSCOPY KNEE WITH MENISCAL REPAIR Right 2015    Procedure: ARTHROSCOPY KNEE WITH MENISCAL REPAIR;  Surgeon: Tevin Cordero MD;  [facility]     C NONSPECIFIC PROCEDURE      rt ankle repair     HC REMOVAL OF OVARIAN CYST(S)       LAPAROSCOPIC CHOLECYSTECTOMY       MARSUPIALIZATION BARTHOLIN CYST Bilateral 2019    Procedure: Marsupialization Of Bilateral Bartholin Gland Cysts;  Surgeon: Amara Thakur MD;  Location:  OR     ORTHOPEDIC SURGERY         Social History     Tobacco Use     Smoking status: Current Some Day Smoker     Packs/day: 0.25     Years: 11.00     Pack years: 2.75     Types: Cigarettes, Cigars     Start date: 10/13/1998     Last attempt to quit: 10/13/2017     Years since quittin.4     Smokeless tobacco: Never Used     Tobacco comment: quit with preg   Substance Use Topics     Alcohol use: Yes     Comment: every 2 week     Family History   Problem Relation Age of Onset     C.A.D. Mother         MI & CHF     Cancer Maternal Grandmother      Hypertension Paternal Grandmother      Cancer Paternal Grandmother          ? cervical     Cancer Paternal Aunt         ? cervical     Liver Disease No family hx of            Lab work is in process  Patient Active Problem List   Diagnosis     Surveillance of previously prescribed intrauterine contraceptive device     CARDIOVASCULAR SCREENING; LDL GOAL LESS THAN 160     ACL tear     Acute meniscal tear of right knee, initial encounter     Acquired defect of articular cartilage     Other orthopedic aftercare(V54.89)     Abnormal gait     Right knee pain     Costochondral chest pain     Pain in thoracic spine     Elevated blood pressure reading without diagnosis of hypertension     Family history of cerebrovascular accident in sister     Pulmonary lesion, right     Class 1 obesity with body mass index (BMI) of 30.0 to 30.9 in adult     Closed fracture of sternum, unspecified portion of sternum, initial encounter     Essential hypertension     Chronic nonseasonal allergic rhinitis due to other allergen     Abscess of Bartholin's gland     Past Surgical History:   Procedure Laterality Date     ARTHROSCOPIC RECONSTRUCTION ANTERIOR CRUCIATE LIGAMENT Right 11/24/2015    Procedure: ARTHROSCOPIC RECONSTRUCTION ANTERIOR CRUCIATE LIGAMENT;  Surgeon: Tevin Cordero MD;  [facility]     ARTHROSCOPY KNEE WITH MENISCAL REPAIR Right 11/24/2015    Procedure: ARTHROSCOPY KNEE WITH MENISCAL REPAIR;  Surgeon: Tevin Cordero MD;  [facility]     C NONSPECIFIC PROCEDURE  2008    rt ankle repair     HC REMOVAL OF OVARIAN CYST(S)  1998     LAPAROSCOPIC CHOLECYSTECTOMY  1998     MARSUPIALIZATION BARTHOLIN CYST Bilateral 8/8/2019    Procedure: Marsupialization Of Bilateral Bartholin Gland Cysts;  Surgeon: Amara Thakur MD;  Location:  OR     ORTHOPEDIC SURGERY         Social History     Tobacco Use     Smoking status: Current Some Day Smoker     Packs/day: 0.25     Years: 11.00     Pack years: 2.75     Types: Cigarettes, Cigars     Start date: 10/13/1998      "Last attempt to quit: 10/13/2017     Years since quittin.4     Smokeless tobacco: Never Used     Tobacco comment: quit with preg   Substance Use Topics     Alcohol use: Yes     Comment: every 2 week     Family History   Problem Relation Age of Onset     C.A.D. Mother         MI & CHF     Cancer Maternal Grandmother      Hypertension Paternal Grandmother      Cancer Paternal Grandmother         ? cervical     Cancer Paternal Aunt         ? cervical     Liver Disease No family hx of          Current Outpatient Medications   Medication Sig Dispense Refill     amLODIPine 5 MG PO tablet Take 1 tablet (5 mg) by mouth daily 90 tablet 1     diphenhydrAMINE (BENADRYL) 25 MG tablet Take 25 mg by mouth every 6 hours as needed for itching or allergies       levonorgestrel (MIRENA) 20 MCG/24HR IUD 1 each by Intrauterine route once         OBJECTIVE:                                                    /84 (BP Location: Left arm, Patient Position: Sitting, Cuff Size: Adult Regular)   Pulse 87   Temp 97.9  F (36.6  C) (Oral)   Resp 16   Ht 1.753 m (5' 9\")   Wt 90.7 kg (200 lb)   SpO2 100%   BMI 29.53 kg/m   Body mass index is 29.53 kg/m .   GEN: A&Ox4, NAD, wearing sunglasses  Eyes: PERRLA, EOM's intact. No injection. Pain with light exposure  EARS: External ear normal. Canal clear. TM normal  Face: no tenderness  Nose: no discharge, mucosal edema or injection  ORAL CAVITY/OROPHARYNX: No lesions to the lips, gums, or buccal mucosa. The mucosa is moist, no oral lesions or masses. Tongue is normal. No postnasal drip noted, no erythema or discharge.   NECK:supple, symmetric, no masses, normal without adenopathy, trachea is midline, thyroid not enlarged, full ROM. without TTP over cervical muscles without hypertonicity. The carotid pulses are full without bruit. There is no jugular venous distention.  NEUROLOGICALCRANIAL NERVES: , II - XII are normal, REFLEXES, DTRs are 1 - 2+ and symmetric in upper and lower " "extremities, PATHOLOGIC REFLEXES, grossly negative , SENSATION:, Grossly normal and symmetric, MOTOR FUNCTION:, Grossly normal strength , BALANCE AND GAIT , grossly normal , Rhomberg is negative, Finger to nose testing is slightly abnormal on right side, CORTICAL FUNCTION:, normal intellectual function, normal memory, good judgment, oriented x 3   LUNGS:  Respiratory effort is normal without respiratory distress. The lungs are clear to auscultation.   HEART:  The heart is regular without murmur lifts, thrills, or heaves. No S3 or S4 is present. There is no jugular venous distention.   GAIT:normal including tandem walk, heel and toe walk.      No results found for this or any previous visit (from the past 24 hour(s)).       ASSESSMENT/PLAN:                                                        ICD-10-CM    1. Post concussive syndrome  F07.81      Overall symptoms are improving and she's doing much better with regard to her neurological testing. Her finger to nose on the right side is still a bit abnormal and she also has significant photophobia. She'll need to take another week off work and then we'll reevaluate returning to work for 4 hour shifts to start. Return to clinic for any new or worsening symptoms or go to ER Urgent care in off hours       Patient Instructions   Follow up in 1 week.   No driving  Return to clinic for any new or worsening symptoms or go to ER Urgent care in off hours           Estimated body mass index is 29.53 kg/m  as calculated from the following:    Height as of this encounter: 1.753 m (5' 9\").    Weight as of this encounter: 90.7 kg (200 lb).       Anuradha Ku  [facility]      "

## 2020-03-17 NOTE — PATIENT INSTRUCTIONS
Follow up in 1 week.   No driving  Return to clinic for any new or worsening symptoms or go to ER Urgent care in off hours

## 2020-03-20 NOTE — PROGRESS NOTES
"Eliana Anders is a 40 year old female who is being evaluated via a billable telephone visit.      The patient has been notified of following:     \"This telephone visit will be conducted via a call between you and your physician/provider. We have found that certain health care needs can be provided without the need for a physical exam.  This service lets us provide the care you need with a short phone conversation.  If a prescription is necessary we can send it directly to your pharmacy.  If lab work is needed we can place an order for that and you can then stop by our lab to have the test done at a later time.    If during the course of the call the physician/provider feels a telephone visit is not appropriate, you will not be charged for this service.\"      Consent to telephone visit - Marci Cooper CMA  March 24, 2020 12:43 PM      Eliana Anders complains of    Chief Complaint   Patient presents with     Following up from visit     Post Concussive Syndrome       I have reviewed and updated the patient's Past Medical History, Social History, Family History and Medication List.        ALLERGIES  No known drug allergies      Additional provider notes: still having headaches and sensitivity to the light. No neck pain. She gets dizzy when she does light activities. Not much improvement since last week. No new symptoms.     Assessment/Plan:    ICD-10-CM    1. Post concussion syndrome  F07.81 CONCUSSION  REFERRAL     Referral placed to concussion clinic. Note off work until she's cleared from the concussion clinic. No driving. Return to clinic for any new or worsening symptoms or go to ER Urgent care in off hours         Phone call duration:  8 minutes    Anuradha Ku PA-C  "

## 2020-03-24 ENCOUNTER — VIRTUAL VISIT (OUTPATIENT)
Dept: FAMILY MEDICINE | Facility: CLINIC | Age: 40
End: 2020-03-24
Payer: OTHER MISCELLANEOUS

## 2020-03-24 DIAGNOSIS — F07.81 POST CONCUSSION SYNDROME: Primary | ICD-10-CM

## 2020-03-24 PROCEDURE — 99213 OFFICE O/P EST LOW 20 MIN: CPT | Mod: TEL | Performed by: PHYSICIAN ASSISTANT

## 2020-03-24 ASSESSMENT — PATIENT HEALTH QUESTIONNAIRE - PHQ9: SUM OF ALL RESPONSES TO PHQ QUESTIONS 1-9: 11

## 2020-03-24 NOTE — LETTER
[facility]  [address] [facility]  [address]  [facility] [address]  Phone: [phone]  Fax: [phone]    March 24, 2020        Eliana Anders  [address]    [facility] [address]          To whom it may concern:    RE: Eliana Goetz [place] continues to have complications from her concussion and will be following up with the concussion clinic. At this time, she needs to remain out of work until she is cleared from the concussion clinic to return to work safely.     Please contact me for questions or concerns.      Sincerely,        Anuradha Ku PA-C

## 2020-03-25 ENCOUNTER — TRANSFERRED RECORDS (OUTPATIENT)
Dept: HEALTH INFORMATION MANAGEMENT | Facility: CLINIC | Age: 40
End: 2020-03-25

## 2020-03-26 ENCOUNTER — TRANSFERRED RECORDS (OUTPATIENT)
Dept: HEALTH INFORMATION MANAGEMENT | Facility: CLINIC | Age: 40
End: 2020-03-26

## 2020-04-29 ENCOUNTER — TRANSFERRED RECORDS (OUTPATIENT)
Dept: HEALTH INFORMATION MANAGEMENT | Facility: CLINIC | Age: 40
End: 2020-04-29

## 2020-05-07 ENCOUNTER — AMBULATORY - HEALTHEAST (OUTPATIENT)
Dept: ADMINISTRATIVE | Facility: REHABILITATION | Age: 40
End: 2020-05-07

## 2020-05-07 DIAGNOSIS — R42 DIZZINESS: ICD-10-CM

## 2020-05-07 DIAGNOSIS — G44.309 HEADACHE, POST-TRAUMATIC: ICD-10-CM

## 2020-05-07 DIAGNOSIS — M54.2 NECK PAIN: ICD-10-CM

## 2020-05-07 DIAGNOSIS — G93.40 ENCEPHALOPATHY: ICD-10-CM

## 2020-05-21 ENCOUNTER — HOSPITAL ENCOUNTER (OUTPATIENT)
Dept: OCCUPATIONAL THERAPY | Facility: CLINIC | Age: 40
Setting detail: THERAPIES SERIES
End: 2020-05-21
Attending: PSYCHIATRY & NEUROLOGY
Payer: OTHER MISCELLANEOUS

## 2020-05-21 PROCEDURE — 97535 SELF CARE MNGMENT TRAINING: CPT | Mod: GO | Performed by: OCCUPATIONAL THERAPIST

## 2020-05-21 PROCEDURE — 97165 OT EVAL LOW COMPLEX 30 MIN: CPT | Mod: GO | Performed by: OCCUPATIONAL THERAPIST

## 2020-05-21 NOTE — PROGRESS NOTES
05/21/20 1400   Quick Adds   Quick Adds Concussion   Type of Visit Initial Outpatient Occupational Therapy Evaluation   General Information   Start Of Care Date 05/21/20   Referring Physician Henrry Logan   Orders Evaluate and treat as indicated   Orders Date 05/07/20   Medical Diagnosis Concussion wihtout LOC   Onset of Illness/Injury or Date of Surgery 02/26/20   Precautions/Limitations No known precautions/limitations   Surgical/Medical History Reviewed Yes   Additional Occupational Profile Info/Pertinent History of Current Problem 40 year old female s/p concussion with prior concussion in 2016 both while at work.     Comments/Observations currently on leave as they have no light duty to be done   Role/Living Environment   Current Community Support Family/friend caregiver  (Daughter and boyfriend)   Patient role/Employment history Employed;Disabled  ([employer])   Current Living Environment Apartment/condo   Patient/family Goals Statement GEt back to work- I need to get back to my routine   Vision Interview   Technology Used phone and tv   Technology Use Increases Symptoms Yes   Technology Use Increases Symptoms Comments initially at first    Do Glasses Help? No   Brings Print Close to Read yes, looked jumbled   Reading Endurance/Fatigue   Complaints of Visual Fatigue Comments only initally   Convergence Normal   Convergence Comments 4cm   Pursuits Normal   Difficulties with IADL Performance: Increase in Symptoms with the Following   Difficulty Concentrating at School, Work or Home When headache occur challenged with conentration   Mood Changes   Is Patient Experiencing Changes in Mood? Depression;Feeling irritable   Patient Mood Comments Depression is getting better   Is Patient Currently Receiving Treatment for Mental Health? Yes   Mental Health Treatment Comments medication   Vestibular Symptoms   Is Patient Experiencing Vestibular Symptoms? Yes   Triggers for Vestibular Symptoms Include:  "Dizzyness when getting up from bed too fasat   Fatigue   General Fatigue Comments Everything takes a lot longer   Pain   Patient currently in pain Denies   Pain comments Headaches mostly but had one 3 days ago   Cognitive Status Examination   Cognitive Comment [place] 27/30- demos good use of strategies without prompting.   Activity Tolerance   Activity Tolerance I have to take a lot of rest breaks now.   Instrumental Activities of Daily Living Assessment   IADL Assessment/Observations Gets help as needed from daughter and boyfriend as needed.  \" I need to do more around the house.\"   Planned Therapy Interventions   Planned Therapy Interventions ADL training;Cognitive skills;Cognitive performance testing;ROM;Self care/Home management;Strengthening;Stretching;Therapeutic activities   Adult OT Eval Goals   OT Eval Goals (Adult) 1    OT Goal 1   Goal Identifier CSA   Goal Description Patient will demo and report 5 ways to manage concussion symptoms and decreaed CSA to under 10 for multiple apointments in order to return to work full time.   Target Date 08/19/20   Clinical Impression   Criteria for Skilled Therapeutic Interventions Met Yes, treatment indicated   OT Diagnosis Impaired participation in adls and iadls   Influenced by the following impairments headaches, cognitve impairment, and decreaed endurace.   Assessment of Occupational Performance 1-3 Performance Deficits   Identified Performance Deficits unable to work, participate in housework, cooking   Clinical Decision Making (Complexity) Low complexity   Therapy Frequency up to 10 visits as needed   Predicted Duration of Therapy Intervention (days/wks) in 90 day   Risks and Benefits of Treatment have been explained. Yes   Patient, Family & other staff in agreement with plan of care Yes   Clinical Impression Comments Skilled Ot services need to train in concussion mgmt in order to assist with safe return to work and other iadls   Education Assessment   Barriers To " Learning No Barriers   Preferred Learning Style Listening;Demonstration   Total Evaluation Time   OT Eval, Low Complexity Minutes (55910) 45   Electronically signed by:  Jackie Mendoza OTR/L, ATP      Occupational Therapist, Assistive   [phone]      fax: [phone]      [email or web address], [address]  [place] [address]

## 2020-06-09 ENCOUNTER — TRANSFERRED RECORDS (OUTPATIENT)
Dept: HEALTH INFORMATION MANAGEMENT | Facility: CLINIC | Age: 40
End: 2020-06-09

## 2020-06-25 ENCOUNTER — HOSPITAL ENCOUNTER (OUTPATIENT)
Dept: OCCUPATIONAL THERAPY | Facility: CLINIC | Age: 40
Setting detail: THERAPIES SERIES
End: 2020-06-25
Attending: PSYCHIATRY & NEUROLOGY
Payer: OTHER MISCELLANEOUS

## 2020-06-25 PROCEDURE — 97535 SELF CARE MNGMENT TRAINING: CPT | Mod: GO | Performed by: OCCUPATIONAL THERAPIST

## 2020-07-01 ENCOUNTER — TRANSFERRED RECORDS (OUTPATIENT)
Dept: HEALTH INFORMATION MANAGEMENT | Facility: CLINIC | Age: 40
End: 2020-07-01

## 2020-07-02 ENCOUNTER — HOSPITAL ENCOUNTER (OUTPATIENT)
Dept: OCCUPATIONAL THERAPY | Facility: CLINIC | Age: 40
Setting detail: THERAPIES SERIES
End: 2020-07-02
Attending: PSYCHIATRY & NEUROLOGY
Payer: OTHER MISCELLANEOUS

## 2020-07-02 PROCEDURE — 97535 SELF CARE MNGMENT TRAINING: CPT | Mod: GO | Performed by: OCCUPATIONAL THERAPIST

## 2020-07-09 ENCOUNTER — HOSPITAL ENCOUNTER (OUTPATIENT)
Dept: OCCUPATIONAL THERAPY | Facility: CLINIC | Age: 40
Setting detail: THERAPIES SERIES
End: 2020-07-09
Attending: PSYCHIATRY & NEUROLOGY
Payer: OTHER MISCELLANEOUS

## 2020-07-09 PROCEDURE — 97535 SELF CARE MNGMENT TRAINING: CPT | Mod: GO | Performed by: OCCUPATIONAL THERAPIST

## 2020-07-16 ENCOUNTER — HOSPITAL ENCOUNTER (OUTPATIENT)
Dept: OCCUPATIONAL THERAPY | Facility: CLINIC | Age: 40
Setting detail: THERAPIES SERIES
End: 2020-07-16
Attending: PSYCHIATRY & NEUROLOGY
Payer: COMMERCIAL

## 2020-07-16 PROCEDURE — 97535 SELF CARE MNGMENT TRAINING: CPT | Mod: GO | Performed by: OCCUPATIONAL THERAPIST

## 2020-07-30 ENCOUNTER — HOSPITAL ENCOUNTER (OUTPATIENT)
Dept: OCCUPATIONAL THERAPY | Facility: CLINIC | Age: 40
Setting detail: THERAPIES SERIES
End: 2020-07-30
Attending: PSYCHIATRY & NEUROLOGY
Payer: COMMERCIAL

## 2020-07-30 PROCEDURE — 97535 SELF CARE MNGMENT TRAINING: CPT | Mod: GO | Performed by: OCCUPATIONAL THERAPIST

## 2020-08-19 ENCOUNTER — TRANSFERRED RECORDS (OUTPATIENT)
Dept: HEALTH INFORMATION MANAGEMENT | Facility: CLINIC | Age: 40
End: 2020-08-19

## 2020-09-03 ENCOUNTER — ANCILLARY PROCEDURE (OUTPATIENT)
Dept: CT IMAGING | Facility: CLINIC | Age: 40
End: 2020-09-03
Attending: INTERNAL MEDICINE
Payer: COMMERCIAL

## 2020-09-03 DIAGNOSIS — R91.8 PULMONARY NODULES: ICD-10-CM

## 2020-09-15 DIAGNOSIS — I10 BENIGN ESSENTIAL HYPERTENSION: Primary | ICD-10-CM

## 2020-09-16 RX ORDER — AMLODIPINE BESYLATE 5 MG/1
5 TABLET ORAL DAILY
Qty: 90 TABLET | Refills: 0 | Status: SHIPPED | OUTPATIENT
Start: 2020-09-16 | End: 2020-12-29

## 2020-09-16 NOTE — TELEPHONE ENCOUNTER
Prescription approved per [facility] Refill Protocol.    Amanda Desir RN   [facility]

## 2020-09-22 ENCOUNTER — VIRTUAL VISIT (OUTPATIENT)
Dept: PULMONOLOGY | Facility: CLINIC | Age: 40
End: 2020-09-22
Attending: INTERNAL MEDICINE
Payer: COMMERCIAL

## 2020-09-22 DIAGNOSIS — R91.8 PULMONARY NODULES: Primary | ICD-10-CM

## 2020-09-22 RX ORDER — GABAPENTIN 100 MG/1
CAPSULE ORAL
COMMUNITY
Start: 2020-01-15 | End: 2022-08-30
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
## 2020-09-22 NOTE — PROGRESS NOTES
"Eliana Anders is a 40 year old female who is being evaluated via a billable video visit.      The patient has been notified of following:     \"This video visit will be conducted via a call between you and your physician/provider. We have found that certain health care needs can be provided without the need for an in-person physical exam.  This service lets us provide the care you need with a video conversation.  If a prescription is necessary we can send it directly to your pharmacy.  If lab work is needed we can place an order for that and you can then stop by our lab to have the test done at a later time.    Video visits are billed at different rates depending on your insurance coverage.  Please reach out to your insurance provider with any questions.    If during the course of the call the physician/provider feels a video visit is not appropriate, you will not be charged for this service.\"    Patient has given verbal consent for Video visit? Yes  How would you like to obtain your AVS? Mail a copy     Please text invite to [phone].     If you are dropped from the video visit, the video invite should be resent to: Text to cell phone: [phone]  Will anyone else be joining your video visit? No      Patient had no vitals to report.   0/10 on pain scale.     No refills needed.   No additional concerns.     Padmaja Carr CMA      Video-Visit Details    Type of service:  Video Visit    Video Start Time: 3:04 PM  Video End Time: 315    Originating Location (pt. Location): Home    Distant Location (provider location):  [facility]     Platform used for Video Visit: Fragegg    Ezio Jarrett MD        "
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
## 2020-11-03 ENCOUNTER — APPOINTMENT (OUTPATIENT)
Dept: GENERAL RADIOLOGY | Facility: CLINIC | Age: 40
End: 2020-11-03
Attending: EMERGENCY MEDICINE
Payer: COMMERCIAL

## 2020-11-03 ENCOUNTER — HOSPITAL ENCOUNTER (EMERGENCY)
Facility: CLINIC | Age: 40
Discharge: HOME | End: 2020-11-03
Attending: EMERGENCY MEDICINE | Admitting: EMERGENCY MEDICINE
Payer: COMMERCIAL

## 2020-11-03 ENCOUNTER — APPOINTMENT (OUTPATIENT)
Dept: CT IMAGING | Facility: CLINIC | Age: 40
End: 2020-11-03
Attending: EMERGENCY MEDICINE
Payer: COMMERCIAL

## 2020-11-03 VITALS
SYSTOLIC BLOOD PRESSURE: 140 MMHG | RESPIRATION RATE: 18 BRPM | OXYGEN SATURATION: 97 % | HEART RATE: 98 BPM | DIASTOLIC BLOOD PRESSURE: 65 MMHG | TEMPERATURE: 96.8 F

## 2020-11-03 DIAGNOSIS — S80.00XA CONTUSION OF KNEE, UNSPECIFIED LATERALITY, INITIAL ENCOUNTER: ICD-10-CM

## 2020-11-03 DIAGNOSIS — S06.0X0A CONCUSSION WITHOUT LOSS OF CONSCIOUSNESS, INITIAL ENCOUNTER: ICD-10-CM

## 2020-11-03 LAB
CREAT BLD-MCNC: 0.6 MG/DL (ref 0.52–1.04)
GFR SERPL CREATININE-BSD FRML MDRD: >90 ML/MIN/{1.73_M2}
HCG UR QL: NEGATIVE
INTERNAL QC OK POCT: YES

## 2020-11-03 PROCEDURE — 70491 CT SOFT TISSUE NECK W/DYE: CPT

## 2020-11-03 PROCEDURE — 99284 EMERGENCY DEPT VISIT MOD MDM: CPT | Mod: 25 | Performed by: EMERGENCY MEDICINE

## 2020-11-03 PROCEDURE — 99284 EMERGENCY DEPT VISIT MOD MDM: CPT | Performed by: EMERGENCY MEDICINE

## 2020-11-03 PROCEDURE — 73560 X-RAY EXAM OF KNEE 1 OR 2: CPT | Mod: 50

## 2020-11-03 PROCEDURE — 73610 X-RAY EXAM OF ANKLE: CPT | Mod: RT

## 2020-11-03 PROCEDURE — 82565 ASSAY OF CREATININE: CPT

## 2020-11-03 PROCEDURE — 250N000011 HC RX IP 250 OP 636: Performed by: EMERGENCY MEDICINE

## 2020-11-03 PROCEDURE — 258N000003 HC RX IP 258 OP 636: Performed by: EMERGENCY MEDICINE

## 2020-11-03 PROCEDURE — 250N000013 HC RX MED GY IP 250 OP 250 PS 637: Performed by: EMERGENCY MEDICINE

## 2020-11-03 PROCEDURE — 70460 CT HEAD/BRAIN W/DYE: CPT

## 2020-11-03 RX ORDER — SODIUM CHLORIDE 9 MG/ML
100 INJECTION, SOLUTION INTRAVENOUS ONCE
Status: COMPLETED | OUTPATIENT
Start: 2020-11-03 | End: 2020-11-03

## 2020-11-03 RX ORDER — IOPAMIDOL 755 MG/ML
100 INJECTION, SOLUTION INTRAVASCULAR ONCE
Status: COMPLETED | OUTPATIENT
Start: 2020-11-03 | End: 2020-11-03

## 2020-11-03 RX ORDER — OXYCODONE HYDROCHLORIDE 5 MG/1
5 TABLET ORAL ONCE
Status: COMPLETED | OUTPATIENT
Start: 2020-11-03 | End: 2020-11-03

## 2020-11-03 RX ADMIN — OXYCODONE HYDROCHLORIDE 5 MG: 5 TABLET ORAL at 20:34

## 2020-11-03 RX ADMIN — IOPAMIDOL 100 ML: 755 INJECTION, SOLUTION INTRAVENOUS at 22:23

## 2020-11-03 RX ADMIN — SODIUM CHLORIDE 80 ML: 9 INJECTION, SOLUTION INTRAVENOUS at 22:23

## 2020-11-03 NOTE — ED AVS SNAPSHOT
[facility] Emergency Department  [address]  [facility] [place] [place] [address]  Phone: [phone]  Fax: [phone]                                    Eliana Anders   MRN: [id number]    Department: [facility] Emergency Department   Date of Visit: 11/3/2020           After Visit Summary Signature Page    I have received my discharge instructions, and my questions have been answered. I have discussed any challenges I see with this plan with the nurse or doctor.    ..........................................................................................................................................  Patient/Patient Representative Signature      ..........................................................................................................................................  Patient Representative Print Name and Relationship to Patient    ..................................................               ................................................  Date                                   Time    ..........................................................................................................................................  Reviewed by Signature/Title    ...................................................              ..............................................  Date                                               Time          22EPIC Rev 08/18

## 2020-11-04 NOTE — DISCHARGE INSTRUCTIONS
You have been referred to [facility]  [facility].  The  Representative will assist you in the coordination of your concussion care as prescribed by your provider.  The  Representative will contact you within one business day, or you may contact the  Representative at [phone].    Apply ice to areas of discomfort.  You may use acetaminophen 1000 mg every 6 hours or ibuprofen 600 mg every 6 hours as needed for discomfort.      If you have worsening symptoms including severe headache, intractable vomiting or other concerns, return to the emergency department for re-evaluation.
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
## 2020-12-06 ENCOUNTER — HEALTH MAINTENANCE LETTER (OUTPATIENT)
Age: 40
End: 2020-12-06

## 2020-12-29 DIAGNOSIS — I10 BENIGN ESSENTIAL HYPERTENSION: ICD-10-CM

## 2020-12-29 RX ORDER — AMLODIPINE BESYLATE 5 MG/1
5 TABLET ORAL DAILY
Qty: 90 TABLET | Refills: 0 | Status: SHIPPED | OUTPATIENT
Start: 2020-12-29 | End: 2021-04-02

## 2021-03-09 NOTE — PROGRESS NOTES
07/30/20 1400   Signing Clinician's Name / Credentials   Signing clinician's name / credentials Jackie Mendoza OTR/L,ATP   Session Number   Session Number 6/10   Additional Session Number auth recieved   Authorization status Work comp    OT Goal 1   Goal Identifier CSA   Goal Description Patient will demo and report 5 ways to manage concussion symptoms and decreaed CSA to under 10 for multiple apointments in order to return to work full time.   Target Date 08/19/20   Subjective Report   Subjective Report MY dad is back in the hospital.   Self Care/Home Management   Self-Care/Home Mgmt/ADL, Compensatory, Meal Prep Minutes (11324) 30 Minutes   Skilled Intervention REview of CSA and life stressor.   Patient Response Patient reports being out of sorts as her dad is back in the hospital and she has been out of town helping with this.   Treatment Detail REview of life stressors an d reasoning for missing apointment due to oversleeping.  Encouraged more time at TCU here once dad is done with acute rehab as well as caregiver trianing in order to increase ease of transition home and her back to work.  Patient reports no symptoms but headaches and relates them to stress.  No other symptoms and feels she is back to almost 100%.  Education on need to take rests with return to work full time and remember to look out for her own needs not just her family.   Progress goals progressing f/u as needed only once back to work full time.   Education   Learner Patient   Readiness Acceptance   Method Demonstration;Booklet/handout;Explanation   Response Verbalizes understanding;Demonstrates understanding   Plan   Plan for next session f/u with return to work   Comments   Comments Discharge Summary- Patient seen for 6 OT visits for concussion mgmt.  No f/u needed or made after this apt.     Total Session Time   Timed Code Treatment Minutes 30   Total Treatment Time (sum of timed and untimed services) 30

## 2021-03-29 DIAGNOSIS — I10 BENIGN ESSENTIAL HYPERTENSION: ICD-10-CM

## 2021-04-01 NOTE — LETTER
Diagnosis:   1. Multiple myeloma not having achieved remission (CMS/HCC)      Patient is on the following regimens:  1. Revlimid 25mg QD 14 days on / 7 days off   2. SQ Velcade/PO dexamethasone 40mg (10 tablets) D1/8/15       Cycle/Day: C16D15    Dr Ivan Newell is supervising clinician today.    ECOG:   ECOG [04/01/21 0921]   ECOG Performance Status 0     Review and verified Advanced Directives: No    Verified if patient has state DNR bracelet on: No; Full Code    Nursing Assessment:   A focused nursing assessment addressing the toxicity of chemotherapy was performed and the patient reports the following:  Nausea: patient denies  Vomiting: patient denies  Fever: patient denies  Chills: patient denies  Other signs of infection: patient denies  Bleeding: patient denies  Mucositis: patient denies  Diarrhea: patient denies  Constipation: patient denies  Anorexia: patient denies  Dysuria: patient denies  Urinary Bleeding: patient denies  Cough: patient denies  Shortness of Breath: patient denies  Fatigue/Weakness: YES, reports baseline grade 1 fatigue  Numbness/Tingling: YES, this is patient's baseline and has remained the same no new concerns per patient  Other Neuropathies: patient denies  Edema: patient denies  Rash: patient denies  Hand/Foot Syndrome: patient denies  Anxiety/Depression/Insomnia: patient denies  Pain: NO    Patient confirms that he has received a copy of Chemotherapy Consent and verbalizes understanding of treatment plan: Yes.     Pre-Treatment:   - Treatment consent signed  - Patient has valid pre-authorization  - VS completed: orthos negative  - BSA double checked  - patient confirms he took his PO dexamethasone dose today 40mg (10 tablets)  - Treatment parameters verified in patient protocol  - Lab results checked: - Chemotherapy doses independently doubled checked & verified by two practitioners  - Patient is identified by first & last name, Date of birth that has been verified by two  February 27, 2020      Eliana Anders  [address]    [place] [facility] [address]        To Whom It May Concern:    Eliana Anders  was seen on 2/27/20.  Please excuse her  until 3/4/20 due to injury.        Sincerely,        Anuradha Ku PA-C     practitioners with the patient chairside.    Treatment: Refer to LDA and MAR for line assessment and medication administration  Refer to Toxicity Assessment doc flowsheet   Chemotherapy has not   Apperance and physical integrity of drugs meets standard of drug monograph    Velcade injection:  Pre-Injection Information: Vital signs entered., Allergies reviewed as required for injection type., Pt states feeling well, no complaints., Pt denies signs and symptoms of infection. and Authorization completed if applicable.     Refer to MAR (medication administration record) for type of injection and medication given.  Needle Size: 25 g. \"  Patient tolerated well: Stable    Post Treatment: Treatment tolerated well; no adverse reaction.    Does the Patient have a central line? No    Transfusion: Not needed    Integrative Medicine: No    Oral Chemotherapy: Yes, Patient is taking medication as prescribed.     Education: No new instructions needed.    Next appointment scheduled:   Future Appointments   Date Time Provider Department Center   2021  4:00 PM [facility] COVID VACCINE [facility]     Patient instructed to call the office with any questions or concerns.    Patient Discharged: patient discharged to home per self, ambulatory, with family member

## 2021-04-02 RX ORDER — AMLODIPINE BESYLATE 5 MG/1
TABLET ORAL
Qty: 90 TABLET | Refills: 0 | Status: SHIPPED | OUTPATIENT
Start: 2021-04-02 | End: 2021-06-29

## 2021-04-02 NOTE — TELEPHONE ENCOUNTER
"Makenna and FEDERICA,    Requested Prescriptions   Pending Prescriptions Disp Refills     amLODIPine (NORVASC) 5 MG tablet [Pharmacy Med Name: AMLODIPINE BESYLATE 5MG TABLETS] 90 tablet 0     Sig: TAKE 1 TABLET(5 MG) BY MOUTH DAILY       Calcium Channel Blockers Protocol  Failed - 4/2/2021  7:49 AM        Failed - Blood pressure under 140/90 in past 12 months     BP Readings from Last 3 Encounters:   11/03/20 (!) 140/65   03/17/20 130/84   03/03/20 122/88                 Failed - Recent (12 mo) or future (30 days) visit within the authorizing provider's specialty     Patient has had an office visit with the authorizing provider or a provider within the authorizing providers department within the previous 12 mos or has a future within next 30 days. See \"Patient Info\" tab in inbasket, or \"Choose Columns\" in Meds & Orders section of the refill encounter.              Passed - Medication is active on med list        Passed - Patient is age 18 or older        Passed - No active pregnancy on record        Passed - Normal serum creatinine on file in past 12 months     Recent Labs   Lab Test 11/03/20  1926 12/20/19  1601   CR  --  0.63   CREAT 0.6  --        Ok to refill medication if creatinine is low          Passed - No positive pregnancy test in past 12 months           LOV: 3/24/20    Routing refill request to provider for review/approval because:  Patient needs to be seen because it has been more than 1 year since last office visit.    Amara Oakes RN  [facility]          "

## 2021-06-27 DIAGNOSIS — I10 BENIGN ESSENTIAL HYPERTENSION: ICD-10-CM

## 2021-06-29 RX ORDER — AMLODIPINE BESYLATE 5 MG/1
TABLET ORAL
Qty: 10 TABLET | Refills: 0 | Status: SHIPPED | OUTPATIENT
Start: 2021-06-29 | End: 2022-08-30

## 2021-09-25 ENCOUNTER — HEALTH MAINTENANCE LETTER (OUTPATIENT)
Age: 41
End: 2021-09-25

## 2022-01-15 ENCOUNTER — HEALTH MAINTENANCE LETTER (OUTPATIENT)
Age: 42
End: 2022-01-15

## 2022-05-16 NOTE — LETTER
September 17, 2019      Eliana Anders  [address]    [facility] [place] [facility] [address]        To Whom It May Concern:    Eliana Anders  was seen on 9/17/19.  Please excuse her  until 9/19/19 due to illness.        Sincerely,        Anuradha Ku PA-C     COVID

## 2022-08-11 NOTE — TELEPHONE ENCOUNTER
Called plan at [phone] to initiate PA. Initiated PA over the phone with Ronald- a certified representative, stated he will process the PA and we will get a response of approval or denial within 24-48 hours. Routing to Makenna Davis.  Milan Benoit MA    
Can you please find out from insurance what are the requirements?  ALEKSANDER Ocampo   
Completed PA for Diclofenac Sodium 1%. Waiting on approval or denial from insurance.   Milan Benoit MA    
Could you please contact the patient regarding the denial and the reason.  I looked back and didn't see any reason why she can't take NSAIDs so it is unlikely the voltaren will get covered.  JAREN OcampoP   
Prior Authorization: Denied    Denied Reason: The Voltaren Gel criteria covers this drug when she is unable to use oral nonsteroidal anti-inflammatory drugs. Usage of this drug did not meet the requirements. Called insurance and talked to Saul who routed to Edenilson-  to ask for the requirements of this medication, Edenilson said will have to do appeal for this medication. Routing to Makenna Davis.     Milan Benoit October 13, 2017 at 3:37 PM      
Prior Authorization: Denied    Denied Reason: Usage of this drug did not meet the requirements.     Milan Benoit September 27, 2017 at 3:41 PM      Routing to Makenna Davis  
Writer called patient left non detailed message requesting return call to clinic and ask to speak with nurse    Charito Jha RN    
Routine observation

## 2022-08-30 ENCOUNTER — APPOINTMENT (OUTPATIENT)
Dept: GENERAL RADIOLOGY | Facility: CLINIC | Age: 42
End: 2022-08-30
Attending: EMERGENCY MEDICINE
Payer: COMMERCIAL

## 2022-08-30 ENCOUNTER — HOSPITAL ENCOUNTER (EMERGENCY)
Facility: CLINIC | Age: 42
Discharge: HOME | End: 2022-08-30
Attending: EMERGENCY MEDICINE | Admitting: EMERGENCY MEDICINE
Payer: COMMERCIAL

## 2022-08-30 VITALS
OXYGEN SATURATION: 95 % | WEIGHT: 254 LBS | HEIGHT: 67 IN | RESPIRATION RATE: 18 BRPM | SYSTOLIC BLOOD PRESSURE: 155 MMHG | DIASTOLIC BLOOD PRESSURE: 98 MMHG | BODY MASS INDEX: 39.87 KG/M2 | TEMPERATURE: 98.9 F | HEART RATE: 80 BPM
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
## 2022-08-30 LAB
CREAT BLD-MCNC: 0.7 MG/DL (ref 0.5–1)
GFR SERPL CREATININE-BSD FRML MDRD: >60 ML/MIN/1.73M2
HOLD SPECIMEN: NORMAL

## 2022-08-30 PROCEDURE — 73080 X-RAY EXAM OF ELBOW: CPT | Mod: LT

## 2022-08-30 PROCEDURE — 99285 EMERGENCY DEPT VISIT HI MDM: CPT | Performed by: EMERGENCY MEDICINE

## 2022-08-30 PROCEDURE — 73562 X-RAY EXAM OF KNEE 3: CPT | Mod: RT

## 2022-08-30 PROCEDURE — 99284 EMERGENCY DEPT VISIT MOD MDM: CPT | Performed by: EMERGENCY MEDICINE

## 2022-08-30 PROCEDURE — 82565 ASSAY OF CREATININE: CPT

## 2022-08-30 PROCEDURE — 73030 X-RAY EXAM OF SHOULDER: CPT | Mod: RT

## 2022-08-30 PROCEDURE — 250N000013 HC RX MED GY IP 250 OP 250 PS 637: Performed by: EMERGENCY MEDICINE

## 2022-08-30 RX ORDER — ACETAMINOPHEN 500 MG
1000 TABLET ORAL ONCE
Status: COMPLETED | OUTPATIENT
Start: 2022-08-30 | End: 2022-08-30

## 2022-08-30 RX ORDER — ACETAMINOPHEN 500 MG
1000 TABLET ORAL 3 TIMES DAILY
Qty: 42 TABLET | Refills: 0 | Status: SHIPPED | OUTPATIENT
Start: 2022-08-30 | End: 2022-09-06

## 2022-08-30 RX ORDER — IBUPROFEN 200 MG
400 TABLET ORAL 3 TIMES DAILY
Qty: 42 TABLET | Refills: 0 | Status: SHIPPED | OUTPATIENT
Start: 2022-08-30

## 2022-08-30 RX ORDER — IBUPROFEN 200 MG
400 TABLET ORAL ONCE
Status: COMPLETED | OUTPATIENT
Start: 2022-08-30 | End: 2022-08-30

## 2022-08-30 RX ORDER — OXYCODONE HYDROCHLORIDE 5 MG/1
5 TABLET ORAL
Qty: 3 TABLET | Refills: 0 | Status: SHIPPED | OUTPATIENT
Start: 2022-08-30 | End: 2022-09-03

## 2022-08-30 RX ADMIN — IBUPROFEN 400 MG: 200 TABLET, FILM COATED ORAL at 23:38

## 2022-08-30 RX ADMIN — ACETAMINOPHEN 1000 MG: 500 TABLET ORAL at 23:38

## 2022-08-30 ASSESSMENT — ACTIVITIES OF DAILY LIVING (ADL)
ADLS_ACUITY_SCORE: 33
ADLS_ACUITY_SCORE: 35
ADLS_ACUITY_SCORE: 35

## 2022-08-30 ASSESSMENT — ENCOUNTER SYMPTOMS
HEADACHES: 0
NECK PAIN: 1

## 2022-08-30 NOTE — ED TRIAGE NOTES
Reports she fell on uneven concerete yesterday twisting L ankle then hitting L elbow, R knee, and braced herself with her hands now today having soreness in her trapes and upper back and L elnow. 7/10 pain back her upper back and 9/10 in the L elbow. Pt R hand dominant.     Denies head injury or LOC.      Took Tylenol 1 G at 3 PM today

## 2022-08-30 NOTE — ED TRIAGE NOTES
Triage Assessment     Row Name 08/30/22 7298       Triage Assessment (Adult)    Airway WDL WDL       Respiratory WDL    Respiratory WDL WDL       Skin Circulation/Temperature WDL    Skin Circulation/Temperature WDL WDL       Cardiac WDL    Cardiac WDL WDL       Peripheral/Neurovascular WDL    Peripheral Neurovascular WDL WDL       Cognitive/Neuro/Behavioral WDL    Cognitive/Neuro/Behavioral WDL WDL
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
## 2022-09-14 ENCOUNTER — OFFICE VISIT (OUTPATIENT)
Dept: FAMILY MEDICINE | Facility: CLINIC | Age: 42
End: 2022-09-14
Payer: COMMERCIAL

## 2022-09-14 VITALS
RESPIRATION RATE: 16 BRPM | OXYGEN SATURATION: 100 % | TEMPERATURE: 98.6 F | BODY MASS INDEX: 40.9 KG/M2 | WEIGHT: 260.6 LBS | HEIGHT: 67 IN | DIASTOLIC BLOOD PRESSURE: 93 MMHG | HEART RATE: 92 BPM | SYSTOLIC BLOOD PRESSURE: 150 MMHG

## 2022-09-14 DIAGNOSIS — R06.83 SNORES: Primary | ICD-10-CM

## 2022-09-14 DIAGNOSIS — E66.01 MORBID OBESITY (H): ICD-10-CM

## 2022-09-14 DIAGNOSIS — I10 BENIGN ESSENTIAL HYPERTENSION: ICD-10-CM

## 2022-09-14 DIAGNOSIS — H91.90 HEARING DIFFICULTY, UNSPECIFIED LATERALITY: ICD-10-CM

## 2022-09-14 PROCEDURE — 99214 OFFICE O/P EST MOD 30 MIN: CPT | Performed by: NURSE PRACTITIONER

## 2022-09-14 RX ORDER — AMLODIPINE BESYLATE 5 MG/1
5 TABLET ORAL DAILY
Qty: 90 TABLET | Refills: 1 | Status: SHIPPED | OUTPATIENT
Start: 2022-09-14 | End: 2023-02-15

## 2022-09-14 ASSESSMENT — PAIN SCALES - GENERAL: PAINLEVEL: NO PAIN (0)

## 2022-09-14 NOTE — PROGRESS NOTES
"  Assessment & Plan     (R06.83) Snores  (primary encounter diagnosis)  Comment: referral for sleep study   Plan: Adult Sleep Eval & Management          Referral            (E66.01) Morbid obesity (H)  Comment: working on weight loss  Plan:     (H91.90) Hearing difficulty, unspecified laterality  Comment: referral placed per request  Plan: Adult Audiology  Referral            (I10) Benign essential hypertension  Comment: restart amlodipine as she hasn't been taking it  Plan: amLODIPine (NORVASC) 5 MG tablet                CARISA Mckoy CNP  [facility] VANDANA Monroy is a 42 year old, presenting for the following health issues:  Hypertension and Fall      HPI     ED/UC Followup:    Facility:  [facility]    Date of visit: 08/30/2022  Reason for visit: Fall  Current Status: continued pain but improving    Was previously on amlodipine 5mg daily. Hasn't been taking it recently   Willing to restart      Had a fall where she was seen in the ED   Is taking tylenol and Ibu since the fall. 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  Physical Exam  Constitutional:       Appearance: Normal appearance.   Pulmonary:      Effort: Pulmonary effort is normal.   Neurological:      Mental Status: She is alert.   Psychiatric:         Mood and Affect: Mood normal.                    "

## 2022-10-11 ENCOUNTER — OFFICE VISIT (OUTPATIENT)
Dept: FAMILY MEDICINE | Facility: CLINIC | Age: 42
End: 2022-10-11
Payer: COMMERCIAL

## 2022-10-11 VITALS
HEIGHT: 67 IN | TEMPERATURE: 97.3 F | BODY MASS INDEX: 40.06 KG/M2 | WEIGHT: 255.2 LBS | DIASTOLIC BLOOD PRESSURE: 87 MMHG | OXYGEN SATURATION: 98 % | RESPIRATION RATE: 16 BRPM | SYSTOLIC BLOOD PRESSURE: 141 MMHG | HEART RATE: 101 BPM

## 2022-10-11 DIAGNOSIS — Z30.431 SURVEILLANCE OF PREVIOUSLY PRESCRIBED INTRAUTERINE CONTRACEPTIVE DEVICE: ICD-10-CM

## 2022-10-11 DIAGNOSIS — Z30.433 ENCOUNTER FOR REMOVAL AND REINSERTION OF INTRAUTERINE CONTRACEPTIVE DEVICE: Primary | ICD-10-CM

## 2022-10-11 PROCEDURE — 58300 INSERT INTRAUTERINE DEVICE: CPT | Performed by: FAMILY MEDICINE

## 2022-10-11 PROCEDURE — 58301 REMOVE INTRAUTERINE DEVICE: CPT | Performed by: FAMILY MEDICINE

## 2022-10-11 ASSESSMENT — PAIN SCALES - GENERAL: PAINLEVEL: NO PAIN (0)

## 2022-10-11 NOTE — PROGRESS NOTES
SUBJECTIVE:    Is a pregnancy test required: No.  Was a consent obtained?  Yes    Subjective: Eliana Anders is a 42 year old  presents for IUD and desires Mirena type IUD.  She requests removal of the IUD because the IUD effectiveness has     Patient has been given the opportunity to ask questions about all forms of birth control, including all options appropriate for Eliana Anders. Discussed that no method of birth control, except abstinence is 100% effective against pregnancy or sexually transmitted infection.     Eliana Anders understands she may have the IUD removed at any time. IUD should be removed by a health care provider and the current IUD will be removed today.    The entire removal and insertion procedure was reviewed with the patient, including care after placement.    Today's PHQ-2 Score:   PHQ-2 (  [employer]) 2022   Q1: Little interest or pleasure in doing things 0   Q2: Feeling down, depressed or hopeless 0   PHQ-2 Score 0   PHQ-2 Total Score (12-17 Years)- Positive if 3 or more points; Administer PHQ-A if positive -       PROCEDURE:    Premedicated with ibuprofen.  A speculum exam was performed and the cervix was visualized. The IUD string was visualized. Using ring forceps, the string  was grasped and the IUD removed intact.    Under sterile technique, cervix was visualized with speculum and prepped with Betadine solution swab x 3. The uterus was gently straightened and sounded to 9.0 cm. IUD prepared for placement, and IUD inserted according to 's instructions without difficulty or significant ressitance, and deployed at the fundus. The strings were visualized and trimmed to 3.5 cm from the external os. Tenaculum was removed and hemostasis noted. Speculum removed.  Patient tolerated procedure well.    Lot # [id number]  Exp: 2024/Oct    EBL: minimal    Complications: none      POST PROCEDURE:    Given 's  handouts, including when to have IUD removed, list of danger s/sx, side effects and follow up recommended. Encouraged condom use for prevention of STD. Advised to call for any fever, for prolonged or severe pain or bleeding, abnormal vaginal dischage, or unable to palpate strings. She was advised to use pain medications (ibuprofen) as needed for mild to moderate pain. Advised to follow-up in clinic in 4-6 weeks for IUD string check if unable to find strings or as directed by provider.     Cristiane Mitchell MD

## 2022-11-15 ENCOUNTER — HOSPITAL ENCOUNTER (EMERGENCY)
Facility: CLINIC | Age: 42
Discharge: HOME | End: 2022-11-15
Attending: EMERGENCY MEDICINE | Admitting: EMERGENCY MEDICINE
Payer: COMMERCIAL

## 2022-11-15 ENCOUNTER — OFFICE VISIT (OUTPATIENT)
Dept: AUDIOLOGY | Facility: CLINIC | Age: 42
End: 2022-11-15
Attending: NURSE PRACTITIONER
Payer: COMMERCIAL

## 2022-11-15 VITALS
WEIGHT: 257 LBS | HEIGHT: 67 IN | TEMPERATURE: 99.7 F | OXYGEN SATURATION: 94 % | RESPIRATION RATE: 20 BRPM | HEART RATE: 111 BPM | SYSTOLIC BLOOD PRESSURE: 140 MMHG | DIASTOLIC BLOOD PRESSURE: 81 MMHG | BODY MASS INDEX: 40.34 KG/M2

## 2022-11-15 DIAGNOSIS — J10.1 INFLUENZA A: ICD-10-CM

## 2022-11-15 DIAGNOSIS — H91.90 HEARING DIFFICULTY, UNSPECIFIED LATERALITY: ICD-10-CM

## 2022-11-15 DIAGNOSIS — H65.193 OTHER ACUTE NONSUPPURATIVE OTITIS MEDIA OF BOTH EARS, RECURRENCE NOT SPECIFIED: ICD-10-CM

## 2022-11-15 LAB
FLUAV RNA SPEC QL NAA+PROBE: POSITIVE
FLUBV RNA RESP QL NAA+PROBE: NEGATIVE
RSV RNA SPEC NAA+PROBE: NEGATIVE
SARS-COV-2 RNA RESP QL NAA+PROBE: NEGATIVE

## 2022-11-15 PROCEDURE — 250N000013 HC RX MED GY IP 250 OP 250 PS 637: Performed by: EMERGENCY MEDICINE

## 2022-11-15 PROCEDURE — 99284 EMERGENCY DEPT VISIT MOD MDM: CPT | Mod: CS | Performed by: EMERGENCY MEDICINE

## 2022-11-15 PROCEDURE — 87637 SARSCOV2&INF A&B&RSV AMP PRB: CPT | Performed by: EMERGENCY MEDICINE

## 2022-11-15 PROCEDURE — 92567 TYMPANOMETRY: CPT | Performed by: AUDIOLOGIST

## 2022-11-15 PROCEDURE — C9803 HOPD COVID-19 SPEC COLLECT: HCPCS | Performed by: EMERGENCY MEDICINE

## 2022-11-15 RX ORDER — ACETAMINOPHEN 500 MG
1000 TABLET ORAL ONCE
Status: COMPLETED | OUTPATIENT
Start: 2022-11-15 | End: 2022-11-15

## 2022-11-15 RX ORDER — AMOXICILLIN 500 MG/1
1000 CAPSULE ORAL 3 TIMES DAILY
Qty: 60 CAPSULE | Refills: 0 | Status: SHIPPED | OUTPATIENT
Start: 2022-11-15 | End: 2022-11-25

## 2022-11-15 RX ORDER — OSELTAMIVIR PHOSPHATE 75 MG/1
75 CAPSULE ORAL 2 TIMES DAILY
Qty: 10 CAPSULE | Refills: 0 | Status: SHIPPED | OUTPATIENT
Start: 2022-11-15 | End: 2022-11-20

## 2022-11-15 RX ADMIN — ACETAMINOPHEN 1000 MG: 500 TABLET ORAL at 10:28

## 2022-11-15 ASSESSMENT — ENCOUNTER SYMPTOMS
SHORTNESS OF BREATH: 0
VOMITING: 0
CHILLS: 1
SORE THROAT: 1
HEADACHES: 1
FEVER: 1
RHINORRHEA: 1
COUGH: 1
ABDOMINAL PAIN: 0
MYALGIAS: 1

## 2022-11-15 ASSESSMENT — ACTIVITIES OF DAILY LIVING (ADL): ADLS_ACUITY_SCORE: 33

## 2022-11-15 NOTE — PROGRESS NOTES
AUDIOLOGY REPORT    SUBJECTIVE: Eliana Anders is a 42 year old female who was seen in the Audiology Clinic at [facility] for audiologic evaluation, referred by CARISA Cardona CNP. The patient reports possible bilateral hearing loss (right worse). She notes bilateral pain and occasional right drainage. She reports bilateral tinnitus. She notes feeling fatigued and chilled today. She denies dizziness or a history of ear surgeries.    OBJECTIVE: Otoscopic exam revealed redness bilaterally. Tympanograms showed negative pressure for the left ear and a flat tracing with normal ear canal volume for the right ear. After explaining the tympanogram results, the patient decided she did not want to proceed with the rest of testing and wanted to go to Urgent Care for her symptoms.     ASSESSMENT: Tympanograms only were performed today.     PLAN: The patient may return for audiologic evaluation if so desired after medical management. Please call this clinic with questions regarding these results or recommendations.      Delon Bell, [facility]  Licensed Audiologist  [place] #[id number]

## 2022-11-15 NOTE — DISCHARGE INSTRUCTIONS
Please make an appointment to follow up with Your Primary Care Provider in 7-10 days if not improving.  Take ibuprofen and Tylenol as needed for fever, body aches, ear pain.  Drink plenty of fluids.  Please return to the emergency department if you develop vomiting, chest pain or shortness of breath.

## 2022-11-15 NOTE — ED TRIAGE NOTES
Triage Assessment     Row Name 11/15/22 1009       Triage Assessment (Adult)    Airway WDL WDL       Respiratory WDL    Respiratory WDL X;cough    Cough Frequency frequent       Skin Circulation/Temperature WDL    Skin Circulation/Temperature WDL WDL       Cardiac WDL    Cardiac WDL WDL       Cognitive/Neuro/Behavioral WDL    Cognitive/Neuro/Behavioral WDL WDL

## 2022-11-15 NOTE — ED TRIAGE NOTES
Pt states that she was just at audiology and they stated that she probably has bilateral ear infections
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
## 2022-11-15 NOTE — LETTER
November 15, 2022      To Whom It May Concern:      Eliana Centeno Bishnu Anders was seen in our Emergency Department today, 11/15/22.  I expect her condition to improve over the next 5-6 days.  She may return to work/school when improved.    Sincerely,        Uma Peterson MD

## 2022-12-10 NOTE — NURSING NOTE
"Chief Complaint   Patient presents with     Physical       Initial BP (!) 154/96  Pulse 76  Temp 98.2  F (36.8  C) (Oral)  Wt 210 lb 11.2 oz (95.6 kg)  SpO2 97%  BMI 33 kg/m2 Estimated body mass index is 33 kg/(m^2) as calculated from the following:    Height as of 4/6/17: 5' 7\" (1.702 m).    Weight as of this encounter: 210 lb 11.2 oz (95.6 kg).  Medication Reconciliation: complete       Milan Benoit MA      "
full weight-bearing

## 2022-12-28 NOTE — TELEPHONE ENCOUNTER
Oncology Distress Screening Follow-up  Clinical Social Work  [facility]    Identified Concern and Score From Distress Screening: How concerned are you about feeling anxious or very scared? : 9    Date of Distress Screening: 10/13/17    Data: Pt is a 37 year old female who was evaluated for pulmonary nodules.    Intervention: The pt shared that is has been having lots of anxiety due to some new spots they found on her lungs. The pt shared that she now has to wait 3 months to see if these spots will get any bigger which makes the pt very nervous and anxious. The pt shared that her anxiety has not impacted her life and she has been able to manage it by staying busy and work and getting good sleep. The pt does not feel that her sleep has been impacted, but she does express her anxiety is a bit worse when she has a poor night of sleep. The pt expressed that she is not interested in any support or resources at this time. She shared she has a great support system that is helping her and she will reach out of she has any future questions or concerns.    Education Provided: Anxiety management support    Follow-up Required: None at this time, the pt feels supportive and has the resources that she feels she needs.       VASQUEZ García, [facility]  Phone [phone]  Pager [phone]       Render Risk Assessment In Note?: no Additional Notes: Sig improvement at 60mg q day prednisone, advised to decrease dose to 40 mg/day. Does need additional drug to achieve control and allow for gradual decrease of steroids. Detail Level: Simple Additional Notes: Advised BRAT diet, push fluids, f/u with PCP if sxs persist

## 2023-01-17 ENCOUNTER — VIRTUAL VISIT (OUTPATIENT)
Dept: SLEEP MEDICINE | Facility: CLINIC | Age: 43
End: 2023-01-17
Attending: NURSE PRACTITIONER
Payer: COMMERCIAL

## 2023-01-17 VITALS — WEIGHT: 265 LBS | HEIGHT: 68 IN | BODY MASS INDEX: 40.16 KG/M2

## 2023-01-17 DIAGNOSIS — R06.83 SNORES: ICD-10-CM

## 2023-01-17 DIAGNOSIS — R06.81 WITNESSED APNEIC SPELLS: ICD-10-CM

## 2023-01-17 DIAGNOSIS — E66.01 MORBID OBESITY WITH BMI OF 40.0-44.9, ADULT (H): ICD-10-CM

## 2023-01-17 DIAGNOSIS — G47.00 INSOMNIA, UNSPECIFIED TYPE: ICD-10-CM

## 2023-01-17 DIAGNOSIS — G47.9 SLEEP DISTURBANCE: Primary | ICD-10-CM

## 2023-01-17 DIAGNOSIS — G47.19 EXCESSIVE DAYTIME SLEEPINESS: ICD-10-CM

## 2023-01-17 PROCEDURE — 99204 OFFICE O/P NEW MOD 45 MIN: CPT | Mod: 95 | Performed by: NURSE PRACTITIONER

## 2023-01-17 RX ORDER — ALBUTEROL SULFATE 90 UG/1
AEROSOL, METERED RESPIRATORY (INHALATION)
COMMUNITY
Start: 2022-11-07 | End: 2023-10-23

## 2023-01-17 ASSESSMENT — SLEEP AND FATIGUE QUESTIONNAIRES
HOW LIKELY ARE YOU TO NOD OFF OR FALL ASLEEP WHILE LYING DOWN TO REST IN THE AFTERNOON WHEN CIRCUMSTANCES PERMIT: HIGH CHANCE OF DOZING
HOW LIKELY ARE YOU TO NOD OFF OR FALL ASLEEP WHILE WATCHING TV: HIGH CHANCE OF DOZING
HOW LIKELY ARE YOU TO NOD OFF OR FALL ASLEEP IN A CAR, WHILE STOPPED FOR A FEW MINUTES IN TRAFFIC: MODERATE CHANCE OF DOZING
HOW LIKELY ARE YOU TO NOD OFF OR FALL ASLEEP WHILE SITTING INACTIVE IN A PUBLIC PLACE: MODERATE CHANCE OF DOZING
HOW LIKELY ARE YOU TO NOD OFF OR FALL ASLEEP WHILE SITTING AND READING: HIGH CHANCE OF DOZING
HOW LIKELY ARE YOU TO NOD OFF OR FALL ASLEEP WHILE SITTING QUIETLY AFTER LUNCH WITHOUT ALCOHOL: MODERATE CHANCE OF DOZING
HOW LIKELY ARE YOU TO NOD OFF OR FALL ASLEEP WHILE SITTING AND TALKING TO SOMEONE: WOULD NEVER DOZE
HOW LIKELY ARE YOU TO NOD OFF OR FALL ASLEEP WHEN YOU ARE A PASSENGER IN A CAR FOR AN HOUR WITHOUT A BREAK: MODERATE CHANCE OF DOZING

## 2023-01-17 NOTE — PATIENT INSTRUCTIONS
"      MY TREATMENT INFORMATION FOR SLEEP APNEA-  Eliana Anders    DOCTOR : CARISA Bravo CNP    Am I having a sleep study at a sleep center?  --->Due to normal delays, you will be contacted within 2-4 weeks to schedule    Am I having a home sleep study?  --->Watch the video for the device you are using:    -/drop off device-   https://www.[employer].com/watch?v=yGGFBdELGhk    -Disposable device sent out require phone/computer application-   https://www.[employer].com/watch?v=BCce_vbiwxE      Frequently asked questions:  1. What is Obstructive Sleep Apnea (VERÓNICA)? VERÓNICA is the most common type of sleep apnea. Apnea means, \"without breath.\"  Apnea is most often caused by narrowing or collapse of the upper airway as muscles relax during sleep.   Almost everyone has occasional apneas. Most people with sleep apnea have had brief interruptions at night frequently for many years.  The severity of sleep apnea is related to how frequent and severe the events are.   2. What are the consequences of VERÓNICA? Symptoms include: feeling sleepy during the day, snoring loudly, gasping or stopping of breathing, trouble sleeping, and occasionally morning headaches or heartburn at night.  Sleepiness can be serious and even increase the risk of falling asleep while driving. Other health consequences may include development of high blood pressure and other cardiovascular disease in persons who are susceptible. Untreated VERÓNICA  can contribute to heart disease, stroke and diabetes.   3. What are the treatment options? In most situations, sleep apnea is a lifelong disease that must be managed with daily therapy. Medications are not effective for sleep apnea and surgery is generally not considered until other therapies have been tried. Your treatment is your choice . Continuous Positive Airway (CPAP) works right away and is the therapy that is effective in nearly everyone. An oral device to hold your jaw forward is usually the " next most reliable option. Other options include postioning devices (to keep you off your back), weight loss, and surgery including a tongue pacing device. There is more detail about some of these options below.  4. Are my sleep studies covered by insurance? Although we will request verification of coverage, we advise you also check in advance of the study to ensure there is coverage.    Important tips for those choosing CPAP and similar devices   Know your equipment:  CPAP is continuous positive airway pressure that prevents obstructive sleep apnea by keeping the throat from collapsing while you are sleeping. In most cases, the device is  smart  and can slowly self-adjusts if your throat collapses and keeps a record every day of how well you are treated-this information is available to you and your care team.  BPAP is bilevel positive airway pressure that keeps your throat open and also assists each breath with a pressure boost to maintain adequate breathing.  Special kinds of BPAP are used in patients who have inadequate breathing from lung or heart disease. In most cases, the device is  smart  and can slowly self-adjusts to assist breathing. Like CPAP, the device keeps a record of how well you are treated.  Your mask is your connection to the device. You get to choose what feels most comfortable and the staff will help to make sure if fits. Here: are some examples of the different masks that are available:       Key points to remember on your journey with sleep apnea:  Sleep study.  PAP devices often need to be adjusted during a sleep study to show that they are effective and adjusted right.  Good tips to remember: Try wearing just the mask during a quiet time during the day so your body adapts to wearing it. A humidifier is recommended for comfort in most cases to prevent drying of your nose and throat. Allergy medication from your provider may help you if you are having nasal congestion.  Getting settled-in. It  takes more than one night for most of us to get used to wearing a mask. Try wearing just the mask during a quiet time during the day so your body adapts to wearing it. A humidifier is recommended for comfort in most cases. Our team will work with you carefully on the first day and will be in contact within 4 days and again at 2 and 4 weeks for advice and remote device adjustments. Your therapy is evaluated by the device each day.   Use it every night. The more you are able to sleep naturally for 7-8 hours, the more likely you will have good sleep and to prevent health risks or symptoms from sleep apnea. Even if you use it 4 hours it helps. Occasionally all of us are unable to use a medical therapy, in sleep apnea, it is not dangerous to miss one night.   Communicate. Call our skilled team on the number provided on the first day if your visit for problems that make it difficult to wear the device. Over 2 out of 3 patients can learn to wear the device long-term with help from our team. Remember to call our team or your sleep providers if you are unable to wear the device as we may have other solutions for those who cannot adapt to mask CPAP therapy. It is recommended that you sleep your sleep provider within the first 3 months and yearly after that if you are not having problems.   Use it for your health. We encourage use of CPAP masks during daytime quiet periods to allow your face and brain to adapt to the sensation of CPAP so that it will be a more natural sensation to awaken to at night or during naps. This can be very useful during the first few weeks or months of adapting to CPAP though it does not help medically to wear CPAP during wakefulness and  should not be used as a strategy just to meet guidelines.  Take care of your equipment. Make sure you clean your mask and tubing using directions every day and that your filter and mask are replaced as recommended or if they are not working.     BESIDES CPAP, WHAT  OTHER THERAPIES ARE THERE?    Positioning Device  Positioning devices are generally used when sleep apnea is mild and only occurs on your back.This example shows a pillow that straps around the waist. It may be appropriate for those whose sleep study shows milder sleep apnea that occurs primarily when lying flat on one's back. Preliminary studies have shown benefit but effectiveness at home may need to be verified by a home sleep test. These devices are generally not covered by medical insurance.  Examples of devices that maintain sleeping on the back to prevent snoring and mild sleep apnea.    Belt type body positioner  http://incuBET.Squidbid/    Electronic reminder  http://nightshifttherapy.com/            Oral Appliance  What is oral appliance therapy?  An oral appliance device fits on your teeth at night like a retainer used after having braces. The device is made by a specialized dentist and requires several visits over 1-2 months before a manufactured device is made to fit your teeth and is adjusted to prevent your sleep apnea. Once an oral device is working properly, snoring should be improved. A home sleep test may be recommended at that time if to determine whether the sleep apnea is adequately treated.       Some things to remember:  -Oral devices are often, but not always, covered by your medical insurance. Be sure to check with your insurance provider.   -If you are referred for oral therapy, you will be given a list of specialized dentists to consider or you may choose to visit the Web site of the American Academy of Dental Sleep Medicine  -Oral devices are less likely to work if you have severe sleep apnea or are extremely overweight.     More detailed information  An oral appliance is a small acrylic device that fits over the upper and lower teeth  (similar to a retainer or a mouth guard). This device slightly moves jaw forward, which moves the base of the tongue forward, opens the airway, improves  breathing for effective treat snoring and obstructive sleep apnea in perhaps 7 out of 10 people .  The best working devices are custom-made by a dental device  after a mold is made of the teeth 1, 2, 3.  When is an oral appliance indicated?  Oral appliance therapy is recommended as a first-line treatment for patients with primary snoring, mild sleep apnea, and for patients with moderate sleep apnea who prefer appliance therapy to use of CPAP4, 5. Severity of sleep apnea is determined by sleep testing and is based on the number of respiratory events per hour of sleep.   How successful is oral appliance therapy?  The success rate of oral appliance therapy in patients with mild sleep apnea is 75-80% while in patients with moderate sleep apnea it is 50-70%. The chance of success in patients with severe sleep apnea is 40-50%. The research also shows that oral appliances have a beneficial effect on the cardiovascular health of VERÓNICA patients at the same magnitude as CPAP therapy7.  Oral appliances should be a second-line treatment in cases of severe sleep apnea, but if not completely successful then a combination therapy utilizing CPAP plus oral appliance therapy may be effective. Oral appliances tend to be effective in a broad range of patients although studies show that the patients who have the highest success are females, younger patients, those with milder disease, and less severe obesity. 3, 6.   Finding a dentist that practices dental sleep medicine  Specific training is available through the American Academy of Dental Sleep Medicine for dentists interested in working in the field of sleep. To find a dentist who is educated in the field of sleep and the use of oral appliances, near you, visit the Web site of the American Academy of Dental Sleep Medicine.    References  1. Terrance et al. Objectively measured vs self-reported compliance during oral appliance therapy for sleep-disordered breathing. Chest  2013; 144(5): 9091-0711.  2. Medhat, et al. Objective measurement of compliance during oral appliance therapy for sleep-disordered breathing. Thorax 2013; 68(1): 91-96.  3. Mayank et al. Mandibular advancement devices in 620 men and women with VERÓNICA and snoring: tolerability and predictors of treatment success. Chest 2004; 125: 1388-6320.  4. Maryann et al. Oral appliances for snoring and VERÓNICA: a review. Sleep 2006; 29: 244-262.  5. Toney et al. Oral appliance treatment for VERÓNICA: an update. J Clin Sleep Med 2014; 10(2): 215-227.  6. Deniz et al. Predictors of OSAH treatment outcome. J Dent Res 2007; 86: 2171-0944.      Weight Loss:    Weight loss is a long-term strategy that may improve sleep apnea in some patients.    Weight management is a personal decision and the decision should be based on your interest and the potential benefits.  If you are interested in exploring weight loss strategies, the following discussion covers the impact on weight loss on sleep apnea and the approaches that may be successful.    Being overweight does not necessarily mean you will have health consequences.  Those who have BMI over 35 or over 27 with existing medical conditions carries greater risk.   Weight loss decreases severity of sleep apnea in most people with obesity. For those with mild obesity who have developed snoring with weight gain, even 15-30 pound weight loss can improve and occasionally eliminate sleep apnea.  Structured and life-long dietary and health habits are necessary to lose weight and keep healthier weight levels.     Though there may be significant health benefits from weight loss, long-term weight loss is very difficult to achieve- studies show success with dietary management in less than 10% of people. In addition, substantial weight loss may require years of dietary control and may be difficult if patients have severe obesity. In these cases, surgical management may be considered.  Finally,  older individuals who have tolerated obesity without health complications may be less likely to benefit from weight loss strategies.      Your BMI is Body mass index is 40.29 kg/m .    What is BMI?  Body mass index (BMI) is one way to tell whether you are at a healthy weight, overweight, or obese. It measures your weight in relation to your height.  A BMI of 18.5 to 24.9 is in the healthy range. A person with a BMI of 25 to 29.9 is considered overweight, and someone with a BMI of 30 or greater is considered obese.  Another way to find out if you are at risk for health problems caused by overweight and obesity is to measure your waist. If you are a woman and your waist is more than 35 inches, or if you are a man and your waist is more than 40 inches, your risk of disease may be higher.  More than two-thirds of American adults are considered overweight or obese. Being overweight or obese increases the risk for further weight gain.  Excess weight may lead to heart disease and diabetes. Creating and following plans for healthy eating and physical activity may help you improve your health.    Methods for maintaining or losing weight.  Weight control is part of healthy lifestyle and includes exercise, emotional health, and healthy eating habits.  Careful eating habits lifelong is the mainstay of weight control.  Though there are significant health benefits from weight loss, long-term weight loss with diet alone may be very difficult to achieve- studies show long-term success with dietary management in less than 10% of people. Attaining a healthy weight may be especially difficult to achieve in those with severe obesity. In some cases, medications, devices and surgical management might be considered.    What can you do?  If you are overweight or obese and are interested in methods for weight loss, you should discuss this with your provider. In addition, we recommend that you review healthy life styles and methods for  weight loss available through the National Institutes of Health patient information sites:   http://win.niddk.nih.gov/publications/index.htm    Surgery:    Surgery for obstructive sleep apnea is considered generally only when other therapies fail to work. Surgery may be discussed with you if you are having a difficult time tolerating CPAP and or when there is an abnormal structure that requires surgical correction.  Nose and throat surgeries often enlarge the airway to prevent collapse.  Most of these surgeries create pain for 1-2 weeks and up to half of the most common surgeries are not effective throughout life.  You should carefully discuss the benefits and drawbacks to surgery with your sleep provider and surgeon to determine if it is the best solution for you.   More information  Surgery for VERÓNICA is directed at areas that are responsible for narrowing or complete obstruction of the airway during sleep.  There are a wide range of procedures available to enlarge and/or stabilize the airway to prevent blockage of breathing in the three major areas where it can occur: the palate, tongue, and nasal regions.  Successful surgical treatment depends on the accurate identification of the factors responsible for obstructive sleep apnea in each person.  A personalized approach is required because there is no single treatment that works well for everyone.  Because of anatomic variation, consultation with an examination by a sleep surgeon is a critical first step in determining what surgical options are best for each patient.  In some cases, examination during sedation may be recommended in order to guide the selection of procedures.  Patients will be counseled about risks and benefits as well as the typical recovery course after surgery. Surgery is typically not a cure for a person s VERÓNICA.  However, surgery will often significantly improve one s VERÓNICA severity (termed  success rate ).  Even in the absence of a cure, surgery  will decrease the cardiovascular risk associated with OSA7; improve overall quality of life8 (sleepiness, functionality, sleep quality, etc).      Palate Procedures:  Patients with VERÓNICA often have narrowing of their airway in the region of their tonsils and uvula.  The goals of palate procedures are to widen the airway in this region as well as to help the tissues resist collapse.  Modern palate procedure techniques focus on tissue conservation and soft tissue rearrangement, rather than tissue removal.  Often the uvula is preserved in this procedure. Residual sleep apnea is common in patient after pharyngoplasty with an average reduction in sleep apnea events of 33%2.      Tongue Procedures:  ExamWhile patients are awake, the muscles that surround the throat are active and keep this region open for breathing. These muscles relax during sleep, allowing the tongue and other structures to collapse and block breathing.  There are several different tongue procedures available.  Selection of a tongue base procedure depends on characteristics seen on physical exam.  Generally, procedures are aimed at removing bulky tissues in this area or preventing the back of the tongue from falling back during sleep.  Success rates for tongue surgery range from 50-62%3.    Hypoglossal Nerve Stimulation:  Hypoglossal nerve stimulation has recently received approval from the [place] Food and Drug Administration for the treatment of obstructive sleep apnea.  This is based on research showing that the system was safe and effective in treating sleep apnea6.  Results showed that the median AHI score decreased 68%, from 29.3 to 9.0. This therapy uses an implant system that senses breathing patterns and delivers mild stimulation to airway muscles, which keeps the airway open during sleep.  The system consists of three fully implanted components: a small generator (similar in size to a pacemaker), a breathing sensor, and a stimulation lead.   Using a small handheld remote, a patient turns the therapy on before bed and off upon awakening.    Candidates for this device must be greater than 18 years of age, have moderate to severe VERÓNICA (AHI between 15-65), BMI less than 35, have tried CPAP/oral appliance for at least 8 weeks without success, and have appropriate upper airway anatomy (determined by a sleep endoscopy performed by Dr. Sreedhar Dangelo).    Hypoglossal Nerve Stimulation Pathway:    The sleep surgeon s office will work with the patient through the insurance prior-authorization process (including communications and appeals).    Nasal Procedures:  Nasal obstruction can interfere with nasal breathing during the day and night.  Studies have shown that relief of nasal obstruction can improve the ability of some patients to tolerate positive airway pressure therapy for obstructive sleep apnea1.  Treatment options include medications such as nasal saline, topical corticosteroid and antihistamine sprays, and oral medications such as antihistamines or decongestants. Non-surgical treatments can include external nasal dilators for selected patients. If these are not successful by themselves, surgery can improve the nasal airway either alone or in combination with these other options.      Combination Procedures:  Combination of surgical procedures and other treatments may be recommended, particularly if patients have more than one area of narrowing or persistent positional disease.  The success rate of combination surgery ranges from 66-80%2,3.    References  Te CURRY. The Role of the Nose in Snoring and Obstructive Sleep Apnoea: An Update.  Eur Arch Otorhinolaryngol. 2011; 268: 1365-73.   Batool SM; Rekha JA; Michael JR; Pallanch JF; Adela MB; Zoey SG; Amanda ZARATE. Surgical modifications of the upper airway for obstructive sleep apnea in adults: a systematic review and meta-analysis. SLEEP 2010;33(10):8895-7215. Chapis COFFMAN. Hypopharyngeal surgery in  obstructive sleep apnea: an evidence-based medicine review.  Arch Otolaryngol Head Neck Surg. 2006 Feb;132(2):206-13.  [place] YH1, Jayashree Y, Evangelista TOÑO. The efficacy of anatomically based multilevel surgery for obstructive sleep apnea. Otolaryngol Head Neck Surg. 2003 Oct;129(4):327-35.  Chapis COFFMAN, Goldberg A. Hypopharyngeal Surgery in Obstructive Sleep Apnea: An Evidence-Based Medicine Review. Arch Otolaryngol Head Neck Surg. 2006 Feb;132(2):206-13.  Yolanda MACDONALD et al. Upper-Airway Stimulation for Obstructive Sleep Apnea.  N Engl J Med. 2014 Jan 9;370(2):139-49.  Raimundo Y et al. Increased Incidence of Cardiovascular Disease in Middle-aged Men with Obstructive Sleep Apnea. Am J Respir Crit Care Med; 2002 166: 159-165  Bettsrebecca VASQUEZ et al. Studying Life Effects and Effectiveness of Palatopharyngoplasty (SLEEP) study: Subjective Outcomes of Isolated Uvulopalatopharyngoplasty. Otolaryngol Head Neck Surg. 2011; 144: 623-631.        WHAT IF I ONLY HAVE SNORING?    Mandibular advancement devices, lateral sleep positioning, long-term weight loss and treatment of nasal allergies have been shown to improve snoring.  Exercising tongue muscles with a game (CicekSepeti.comttps://citysocializer.[employer]/us/abiodun/soundly-reduce-snoring/rv5938826576) or stimulating the tongue during the day with a device (https://doi.org/10.3390/syj14289939) have improved snoring in some individuals.    Remember to Drive Safe... Drive Alive     Sleep health profoundly affects your health, mood, and your safety.  Thirty three percent of the population (one in three of us) is not getting enough sleep and many have a sleep disorder. Not getting enough sleep or having an untreated / undertreated sleep condition may make us sleepy without even knowing it. In fact, our driving could be dramatically impaired due to our sleep health. As your provider, here are some things I would like you to know about driving:     Here are some warning signs for impairment and dangerous drowsy driving:               -Having been awake more than 16 hours               -Looking tired               -Eyelid drooping              -Head nodding (it could be too late at this point)              -Driving for more than 30 minutes     Some things you could do to make the driving safer if you are experiencing some drowsiness:              -Stop driving and rest              -Call for transportation              -Make sure your sleep disorder is adequately treated     Some things that have been shown NOT to work when experiencing drowsiness while driving:              -Turning on the radio              -Opening windows              -Eating any  distracting  /  entertaining  foods (e.g., sunflower seeds, candy, or any other)              -Talking on the phone      Your decision may not only impact your life, but also the life of others. Please, remember to drive safe for yourself and all of us.
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
## 2023-01-23 ENCOUNTER — THERAPY VISIT (OUTPATIENT)
Dept: SLEEP MEDICINE | Facility: CLINIC | Age: 43
End: 2023-01-23
Payer: COMMERCIAL

## 2023-01-23 DIAGNOSIS — G47.19 EXCESSIVE DAYTIME SLEEPINESS: ICD-10-CM

## 2023-01-23 DIAGNOSIS — R06.83 SNORES: ICD-10-CM

## 2023-01-23 DIAGNOSIS — G47.9 SLEEP DISTURBANCE: ICD-10-CM

## 2023-01-23 DIAGNOSIS — E66.01 MORBID OBESITY WITH BMI OF 40.0-44.9, ADULT (H): ICD-10-CM

## 2023-01-23 DIAGNOSIS — R06.81 WITNESSED APNEIC SPELLS: ICD-10-CM

## 2023-01-23 DIAGNOSIS — G47.00 INSOMNIA, UNSPECIFIED TYPE: ICD-10-CM

## 2023-01-23 PROCEDURE — 95810 POLYSOM 6/> YRS 4/> PARAM: CPT | Performed by: INTERNAL MEDICINE

## 2023-01-24 ENCOUNTER — TELEPHONE (OUTPATIENT)
Dept: SLEEP MEDICINE | Facility: CLINIC | Age: 43
End: 2023-01-24
Payer: COMMERCIAL

## 2023-01-24 DIAGNOSIS — G47.34 SLEEP RELATED HYPOXIA: ICD-10-CM

## 2023-01-24 DIAGNOSIS — G47.33 OBSTRUCTIVE SLEEP APNEA: Primary | ICD-10-CM

## 2023-01-24 LAB — SLPCOMP: NORMAL

## 2023-01-24 NOTE — TELEPHONE ENCOUNTER
The overnight polysomnography was reviewed.   The patient had severe obstructive sleep apnea.    I have called the patient and informed her of the results.  We discussed the option of getting an in lab titration study versus home auto CPAP titration.  The patient would prefer the former.    After the titration study was complete, the patient has given me the permission to put in an order for her to get the supplies she needs from [facility].    I will cancel the upcoming follow up visit and recommend the patient to call us back to reschedule after 4 weeks of PAP usage.    Please call the patient and schedule for an in lab titration study as soon as possible.    Thank you.

## 2023-01-24 NOTE — PATIENT INSTRUCTIONS
[facility]    1. Your sleep study will be reviewed by a sleep physician within the next few days.     2. Please follow up in the sleep clinic as scheduled, or, make an appointment with your sleep provider to be seen within two weeks to discuss the results of the sleep study.    3. If you have any questions or problems with your treatment plan, please contact your sleep clinic provider at [phone] to further manage your condition.    4. Please review your attached medication list, and, at your follow-up appointment advise your sleep clinic provider about any changes.    5. Go to [email or web address] for more information about your sleep problems.    Saul Farias CNA, RPSGT  January 24, 2023

## 2023-02-01 ENCOUNTER — THERAPY VISIT (OUTPATIENT)
Dept: SLEEP MEDICINE | Facility: CLINIC | Age: 43
End: 2023-02-01
Attending: INTERNAL MEDICINE
Payer: COMMERCIAL

## 2023-02-01 DIAGNOSIS — G47.34 SLEEP RELATED HYPOXIA: ICD-10-CM

## 2023-02-01 DIAGNOSIS — G47.33 OBSTRUCTIVE SLEEP APNEA: ICD-10-CM

## 2023-02-01 PROCEDURE — 95811 POLYSOM 6/>YRS CPAP 4/> PARM: CPT | Performed by: INTERNAL MEDICINE

## 2023-02-02 ENCOUNTER — TELEPHONE (OUTPATIENT)
Dept: SLEEP MEDICINE | Facility: CLINIC | Age: 43
End: 2023-02-02
Payer: COMMERCIAL

## 2023-02-02 DIAGNOSIS — G47.33 OBSTRUCTIVE SLEEP APNEA: Primary | ICD-10-CM

## 2023-02-02 NOTE — TELEPHONE ENCOUNTER
Patient's titration study was performed and interpreted. I will put in an order for her to get a CPAP machine with [employer] as requested.    Please call the patient to schedule for  ASAP. Thanks.

## 2023-02-02 NOTE — PATIENT INSTRUCTIONS
Completed a all night titration PSG per provider order.    Preliminary AHI 39.  A final therapeutic PAP pressure was achieved.    Supine REM was seen on therapeutic pressure.    Patient reports feeling refreshed in AM.

## 2023-02-14 ENCOUNTER — TELEPHONE (OUTPATIENT)
Dept: SLEEP MEDICINE | Facility: CLINIC | Age: 43
End: 2023-02-14
Payer: COMMERCIAL

## 2023-02-15 ENCOUNTER — TRANSFERRED RECORDS (OUTPATIENT)
Dept: HEALTH INFORMATION MANAGEMENT | Facility: CLINIC | Age: 43
End: 2023-02-15

## 2023-02-15 ENCOUNTER — OFFICE VISIT (OUTPATIENT)
Dept: FAMILY MEDICINE | Facility: CLINIC | Age: 43
End: 2023-02-15
Payer: COMMERCIAL

## 2023-02-15 VITALS
HEART RATE: 95 BPM | TEMPERATURE: 98.1 F | DIASTOLIC BLOOD PRESSURE: 83 MMHG | OXYGEN SATURATION: 98 % | BODY MASS INDEX: 40.24 KG/M2 | WEIGHT: 265.5 LBS | RESPIRATION RATE: 22 BRPM | SYSTOLIC BLOOD PRESSURE: 132 MMHG | HEIGHT: 68 IN

## 2023-02-15 DIAGNOSIS — Z13.1 SCREENING FOR DIABETES MELLITUS: ICD-10-CM

## 2023-02-15 DIAGNOSIS — I49.3 PVC'S (PREMATURE VENTRICULAR CONTRACTIONS): ICD-10-CM

## 2023-02-15 DIAGNOSIS — J30.2 SEASONAL ALLERGIC RHINITIS, UNSPECIFIED TRIGGER: ICD-10-CM

## 2023-02-15 DIAGNOSIS — Z00.00 ROUTINE GENERAL MEDICAL EXAMINATION AT A HEALTH CARE FACILITY: Primary | ICD-10-CM

## 2023-02-15 DIAGNOSIS — Z23 NEED FOR VACCINATION: ICD-10-CM

## 2023-02-15 DIAGNOSIS — I10 BENIGN ESSENTIAL HYPERTENSION: ICD-10-CM

## 2023-02-15 DIAGNOSIS — Z12.4 CERVICAL CANCER SCREENING: ICD-10-CM

## 2023-02-15 DIAGNOSIS — Z71.6 ENCOUNTER FOR SMOKING CESSATION COUNSELING: ICD-10-CM

## 2023-02-15 DIAGNOSIS — Z13.220 LIPID SCREENING: ICD-10-CM

## 2023-02-15 LAB — HBA1C MFR BLD: 5.5 % (ref 0–5.6)

## 2023-02-15 PROCEDURE — 90677 PCV20 VACCINE IM: CPT | Performed by: FAMILY MEDICINE

## 2023-02-15 PROCEDURE — 90471 IMMUNIZATION ADMIN: CPT | Performed by: FAMILY MEDICINE

## 2023-02-15 PROCEDURE — 80061 LIPID PANEL: CPT | Performed by: FAMILY MEDICINE

## 2023-02-15 PROCEDURE — 36415 COLL VENOUS BLD VENIPUNCTURE: CPT | Performed by: FAMILY MEDICINE

## 2023-02-15 PROCEDURE — 80053 COMPREHEN METABOLIC PANEL: CPT | Performed by: FAMILY MEDICINE

## 2023-02-15 PROCEDURE — 99214 OFFICE O/P EST MOD 30 MIN: CPT | Mod: 25 | Performed by: FAMILY MEDICINE

## 2023-02-15 PROCEDURE — 99396 PREV VISIT EST AGE 40-64: CPT | Mod: 25 | Performed by: FAMILY MEDICINE

## 2023-02-15 PROCEDURE — 83036 HEMOGLOBIN GLYCOSYLATED A1C: CPT | Performed by: FAMILY MEDICINE

## 2023-02-15 RX ORDER — AMLODIPINE BESYLATE 5 MG/1
5 TABLET ORAL DAILY
Qty: 90 TABLET | Refills: 1 | Status: SHIPPED | OUTPATIENT
Start: 2023-02-15 | End: 2023-10-16

## 2023-02-15 RX ORDER — CETIRIZINE HYDROCHLORIDE 10 MG/1
10 TABLET ORAL DAILY
Qty: 90 TABLET | Refills: 3 | Status: SHIPPED | OUTPATIENT
Start: 2023-02-15

## 2023-02-15 RX ORDER — DIPHENHYDRAMINE HCL 25 MG
25 TABLET ORAL EVERY 6 HOURS PRN
Qty: 90 TABLET | Refills: 3 | Status: SHIPPED | OUTPATIENT
Start: 2023-02-15

## 2023-02-15 RX ORDER — FLUTICASONE PROPIONATE 50 MCG
1 SPRAY, SUSPENSION (ML) NASAL DAILY
Qty: 16 G | Refills: 11 | Status: SHIPPED | OUTPATIENT
Start: 2023-02-15

## 2023-02-15 ASSESSMENT — ENCOUNTER SYMPTOMS
HEADACHES: 0
SHORTNESS OF BREATH: 0
ARTHRALGIAS: 1
HEARTBURN: 0
DIZZINESS: 0
EYE PAIN: 0
WEAKNESS: 0
DIARRHEA: 0
SORE THROAT: 0
HEMATURIA: 0
NAUSEA: 0
PARESTHESIAS: 0
FREQUENCY: 0
CONSTIPATION: 0
ABDOMINAL PAIN: 0
NERVOUS/ANXIOUS: 0
COUGH: 0
HEMATOCHEZIA: 0
CHILLS: 0
BREAST MASS: 0
PALPITATIONS: 0
JOINT SWELLING: 1
MYALGIAS: 0
FEVER: 0

## 2023-02-15 ASSESSMENT — PAIN SCALES - GENERAL: PAINLEVEL: EXTREME PAIN (8)

## 2023-02-15 NOTE — PROGRESS NOTES
SUBJECTIVE:   CC: Eliana is an 43 year old who presents for preventive health visit.     Patient has been advised of split billing requirements and indicates understanding: Yes  Healthy Habits:     Getting at least 3 servings of Calcium per day:  Yes    Bi-annual eye exam:  Yes    Dental care twice a year:  Yes    Sleep apnea or symptoms of sleep apnea:  Daytime drowsiness, Excessive snoring and Sleep apnea    Diet:  Regular (no restrictions)    Frequency of exercise:  2-3 days/week    Duration of exercise:  15-30 minutes    Taking medications regularly:  Yes    Medication side effects:  None    PHQ-2 Total Score: 0    Additional concerns today:  Yes    Went to audiology  Had a double ear infection and the flu    Cardiology  Had a sleep study   Said heart was racing     Out of blood pressure medication   Amlodipine 5mg     Wants to stop smoking   Would like to use an aid for this    flonase for allergies       Today's PHQ-2 Score:   PHQ-2 (  [employer]) 2/15/2023   Q1: Little interest or pleasure in doing things 0   Q2: Feeling down, depressed or hopeless 0   PHQ-2 Score 0   PHQ-2 Total Score (12-17 Years)- Positive if 3 or more points; Administer PHQ-A if positive -   Q1: Little interest or pleasure in doing things Not at all   Q2: Feeling down, depressed or hopeless Not at all   PHQ-2 Score 0       Have you ever done Advance Care Planning? (For example, a Health Directive, POLST, or a discussion with a medical provider or your loved ones about your wishes): resources given tow patient to review     Social History     Tobacco Use     Smoking status: Some Days     Packs/day: 0.50     Years: 11.00     Pack years: 5.50     Types: Cigarettes, Cigars     Start date: 10/13/1998     Last attempt to quit: 10/13/2017     Years since quittin.3     Smokeless tobacco: Never   Substance Use Topics     Alcohol use: Yes     Comment: occasional     If you drink alcohol do you typically have >3 drinks per day or >7 drinks  "per week? No    Alcohol Use 2/15/2023   Prescreen: >3 drinks/day or >7 drinks/week? No   Prescreen: >3 drinks/day or >7 drinks/week? -       Reviewed orders with patient.  Reviewed health maintenance and updated orders accordingly - Yes      Breast Cancer Screening:    FHS-7: No flowsheet data found.    Pertinent mammograms are reviewed under the imaging tab.    History of abnormal Pap smear: NO - age 30-65 PAP every 5 years with negative HPV co-testing recommended  PAP / HPV Latest Ref Rng & Units 9/28/2017 5/20/2014 4/11/2013   PAP (Historical) - NIL NIL NIL   HPV16 NEG:Negative Negative - -   HPV18 NEG:Negative Negative - -   HRHPV NEG:Negative Negative - -     Reviewed and updated as needed this visit by clinical staff   Tobacco  Allergies  Meds              Reviewed and updated as needed this visit by Provider                  Review of Systems   Constitutional: Negative for chills and fever.   HENT: Positive for ear pain. Negative for congestion, hearing loss and sore throat.    Eyes: Negative for pain and visual disturbance.   Respiratory: Negative for cough and shortness of breath.    Cardiovascular: Negative for chest pain, palpitations and peripheral edema.   Gastrointestinal: Negative for abdominal pain, constipation, diarrhea, heartburn, hematochezia and nausea.   Breasts:  Negative for tenderness, breast mass and discharge.   Genitourinary: Negative for frequency, genital sores, hematuria, pelvic pain, urgency, vaginal bleeding and vaginal discharge.   Musculoskeletal: Positive for arthralgias and joint swelling. Negative for myalgias.   Skin: Negative for rash.   Neurological: Negative for dizziness, weakness, headaches and paresthesias.   Psychiatric/Behavioral: Negative for mood changes. The patient is not nervous/anxious.         OBJECTIVE:   /83   Pulse 95   Temp 98.1  F (36.7  C) (Temporal)   Resp 22   Ht 1.715 m (5' 7.52\")   Wt 120.4 kg (265 lb 8 oz)   SpO2 98%   BMI 40.95 kg/m  "   Physical Exam    ASSESSMENT/PLAN:     1. Routine general medical examination at a health care facility  Healthy adult female except problems listed below     2. Cervical cancer screening  She prefers to do this at a different time     3. Screening for diabetes mellitus  - Hemoglobin A1c; Future  - Hemoglobin A1c    4. Encounter for smoking cessation counseling  Gave these meds and made referral to [facility]   - varenicline (CHANTIX ANTOINE) 0.5 MG X 11 & 1 MG X 42 tablet; Take 0.5 mg tab daily for 3 days, THEN 0.5 mg tab twice daily for 4 days, THEN 1 mg twice daily.  Dispense: 53 tablet; Refill: 0  - SMOKING CESSATION COUNSELING 3-10 MIN  - nicotine (NICORETTE) 2 MG gum; Place 1 each (2 mg) inside cheek every hour as needed for smoking cessation  Dispense: 100 each; Refill: 3    5. Need for vaccination  - Pneumococcal 20 Valent Conjugate (Prevnar 20)    6. PVC's (premature ventricular contractions)  Her ekg during a sleep study showed many pvcs  She notices them.  Will get ziopatch and send to cards   - Adult Leadless EKG Monitor 8 to 14 Days; Future  - Adult Cardiology Eval  Referral; Future    7. Essential hypertension  - REVIEW OF HEALTH MAINTENANCE PROTOCOL ORDERS  - Comprehensive metabolic panel (BMP + Alb, Alk Phos, ALT, AST, Total. Bili, TP); Future  - Comprehensive metabolic panel (BMP + Alb, Alk Phos, ALT, AST, Total. Bili, TP)    8. Lipid screening  - Lipid panel reflex to direct LDL Fasting; Future  - Lipid panel reflex to direct LDL Fasting    9. Benign essential hypertension  - amLODIPine (NORVASC) 5 MG tablet; Take 1 tablet (5 mg) by mouth daily  Dispense: 90 tablet; Refill: 1    10. Seasonal allergic rhinitis, unspecified trigger  - cetirizine (ZYRTEC) 10 MG tablet; Take 1 tablet (10 mg) by mouth daily  Dispense: 90 tablet; Refill: 3  - fluticasone (FLONASE) 50 MCG/ACT nasal spray; Spray 1 spray into both nostrils daily  Dispense: 16 g; Refill: 11  - diphenhydrAMINE (BENADRYL) 25 MG tablet;  "Take 1 tablet (25 mg) by mouth every 6 hours as needed for itching or allergies  Dispense: 90 tablet; Refill: 3        COUNSELING:  Reviewed preventive health counseling, as reflected in patient instructions      BMI:   Estimated body mass index is 40.95 kg/m  as calculated from the following:    Height as of this encounter: 1.715 m (5' 7.52\").    Weight as of this encounter: 120.4 kg (265 lb 8 oz).   Weight management plan: Discussed healthy diet and exercise guidelines      She reports that she has been smoking cigarettes and cigars. She started smoking about 24 years ago. She has a 5.50 pack-year smoking history. She has never used smokeless tobacco.  Nicotine/Tobacco Cessation Plan:   Self help information given to patient    Anthony Hargrove DO  [facility]  "

## 2023-02-16 ENCOUNTER — ANCILLARY PROCEDURE (OUTPATIENT)
Dept: CARDIOLOGY | Facility: CLINIC | Age: 43
End: 2023-02-16
Attending: FAMILY MEDICINE
Payer: COMMERCIAL

## 2023-02-16 ENCOUNTER — TELEPHONE (OUTPATIENT)
Dept: SLEEP MEDICINE | Facility: CLINIC | Age: 43
End: 2023-02-16
Payer: COMMERCIAL

## 2023-02-16 DIAGNOSIS — I49.3 PVC'S (PREMATURE VENTRICULAR CONTRACTIONS): ICD-10-CM

## 2023-02-16 LAB
ALBUMIN SERPL BCG-MCNC: 3.9 G/DL (ref 3.5–5.2)
ALP SERPL-CCNC: 223 U/L (ref 35–104)
ALT SERPL W P-5'-P-CCNC: 66 U/L (ref 10–35)
ANION GAP SERPL CALCULATED.3IONS-SCNC: 11 MMOL/L (ref 7–15)
AST SERPL W P-5'-P-CCNC: 73 U/L (ref 10–35)
BILIRUB SERPL-MCNC: 0.3 MG/DL
BUN SERPL-MCNC: 5.2 MG/DL (ref 6–20)
CALCIUM SERPL-MCNC: 8.3 MG/DL (ref 8.6–10)
CHLORIDE SERPL-SCNC: 106 MMOL/L (ref 98–107)
CHOLEST SERPL-MCNC: 138 MG/DL
CREAT SERPL-MCNC: 0.62 MG/DL (ref 0.51–0.95)
DEPRECATED HCO3 PLAS-SCNC: 20 MMOL/L (ref 22–29)
GFR SERPL CREATININE-BSD FRML MDRD: >90 ML/MIN/1.73M2
GLUCOSE SERPL-MCNC: 88 MG/DL (ref 70–99)
HDLC SERPL-MCNC: 51 MG/DL
LDLC SERPL CALC-MCNC: 61 MG/DL
NONHDLC SERPL-MCNC: 87 MG/DL
POTASSIUM SERPL-SCNC: 4.4 MMOL/L (ref 3.4–5.3)
PROT SERPL-MCNC: 6.8 G/DL (ref 6.4–8.3)
SODIUM SERPL-SCNC: 137 MMOL/L (ref 136–145)
TRIGL SERPL-MCNC: 131 MG/DL

## 2023-02-16 PROCEDURE — 93246 EXT ECG>7D<15D RECORDING: CPT

## 2023-02-16 NOTE — TELEPHONE ENCOUNTER
Reason for Call:  Other call back    Detailed comments: patient called and has an appointemnt with Cj Bates on Feb 27.    Patient is getting a new cpap machine tomorrow.    Should she keep this appointment?  Or booked later with cpap f/u appointment.    Please contact patient.  Thank you.    Phone Number Patient can be reached at: Home number on file [phone] (home)    Best Time: any    Can we leave a detailed message on this number? YES    Call taken on 2/16/2023 at 9:50 AM by Tequila Rocha

## 2023-02-16 NOTE — PROGRESS NOTES
Per Dr. Anthony Hargrove, patient to have 14 Day Ziopatch monitor placed.  Diagnosis: I49.3,PVCs  Monitor placed: Yes  Patient Instructed: Yes  Patient verbalized understanding: Yes  Holter # [id number]    Monitor placed by Sukhdev Lunsford CMA  3:40 PM

## 2023-02-17 ENCOUNTER — DOCUMENTATION ONLY (OUTPATIENT)
Dept: SLEEP MEDICINE | Facility: CLINIC | Age: 43
End: 2023-02-17
Payer: COMMERCIAL

## 2023-02-17 ENCOUNTER — MYC MEDICAL ADVICE (OUTPATIENT)
Dept: FAMILY MEDICINE | Facility: CLINIC | Age: 43
End: 2023-02-17
Payer: COMMERCIAL

## 2023-02-17 DIAGNOSIS — G47.33 OSA (OBSTRUCTIVE SLEEP APNEA): Primary | ICD-10-CM

## 2023-02-17 NOTE — PROGRESS NOTES
Patient was offered choice of vendor and chose [facility].  Patient Eliaan Anders was set up at [facility] on February 17, 2023. Patient received a Resmed Airsense 11 Pressures were set at 18 cm H2O.   Patient s ramp is 10 cm H2O for Auto and FLEX/EPR is EPR, 1.  Patient received a Resmed Mask name: [id number]  Full Face mask size Medium, heated tubing and heated humidifier.  Patient does not need to meet compliance. Patient has a follow up on 2/21/23 with Dr. Corona.    Jordyn Muse, RT

## 2023-02-20 ENCOUNTER — DOCUMENTATION ONLY (OUTPATIENT)
Dept: SLEEP MEDICINE | Facility: CLINIC | Age: 43
End: 2023-02-20
Payer: COMMERCIAL

## 2023-02-20 DIAGNOSIS — G47.33 OSA (OBSTRUCTIVE SLEEP APNEA): Primary | ICD-10-CM

## 2023-02-20 PROBLEM — I10 BENIGN ESSENTIAL HYPERTENSION: Status: ACTIVE | Noted: 2018-05-03

## 2023-02-20 PROBLEM — J30.2 SEASONAL ALLERGIC RHINITIS, UNSPECIFIED TRIGGER: Status: ACTIVE | Noted: 2018-05-03

## 2023-02-20 PROBLEM — I49.3 PVC'S (PREMATURE VENTRICULAR CONTRACTIONS): Status: ACTIVE | Noted: 2023-02-20

## 2023-02-20 NOTE — PROGRESS NOTES
3 day Sleep therapy management telephone visit    Diagnostic AHI: 38.8  PSG    Confirmed with patient at time of call- Yes Patient is still interested in STM service       Subjective measures:  Patient reports CPAP is going well and denies any questions or concerns.       Order settings:  CPAP    18 cm H2O       Device settings:  CPAP  EPR RESMED SOFT RESPONSE SETTING   18 cm  H20 1 OFF       Compliance 50 %    Assessment: Nighty usage most nights over four hours      Action plan: Patient to have 14 day STM visit. Patient has a follow up visit scheduled:   yes within 31-90 days of set up    Replacement device: No  STM ordered by provider: Yes     Total time spent on accessing and  interpreting remote patient PAP therapy data  10 minutes    Total time spent counseling, coaching  and reviewing PAP therapy data with patient  3 minutes    [id number] no

## 2023-03-07 ENCOUNTER — DOCUMENTATION ONLY (OUTPATIENT)
Dept: SLEEP MEDICINE | Facility: CLINIC | Age: 43
End: 2023-03-07
Payer: COMMERCIAL

## 2023-03-07 NOTE — PROGRESS NOTES
14  DAY STM VISIT    Diagnostic AHI: 38.8  PSG    Subjective measures:   Patient using CPAP intermittently right now her sister is in the hospital so she hasn't used her CPAP machine the last few days.      Assessment: Pt not meeting objective benchmarks for compliance  Patient meeting subjective benchmarks.     Action plan: waiting for patient to return call.  and pt to have 30 day STM visit.      Device type: CPAP    PAP settings: CPAP fixed 18 cm  H20          Mask type:  Full Face Mask    Objective measures: 14 day rolling measures            Objective measure goal  Compliance   Goal >70%  Leak   Goal < 24 lpm  AHI  Goal < 5  Usage  Goal >240        Total time spent on accessing and interpreting remote patient PAP therapy data  10 minutes    Total time spent counseling, coaching  and reviewing PAP therapy data with patient  3 minutes    [id number]  [id number]  no (3 day STM)

## 2023-03-10 NOTE — TELEPHONE ENCOUNTER
RECORDS RECEIVED FROM:    DATE RECEIVED:    NOTES STATUS DETAILS   OFFICE NOTE from referring provider  Internal Dr. Hargrove 2-15-23    OFFICE NOTE from other cardiologists  N/A    RECORDS from hospital/ED N/A    MEDICATION LIST Internal    GENERAL CARDIO RECORDS   (ALL APPOINTMENT TYPES)     LABS (CBC,BMP,CMP, TSH) Internal    EKG (STRIPS & REPORTS) N/A    MONITORS (STRIPS & REPORTS) Internal Placed 2-16   ECHOS (IMAGES AND REPORTS) N/A    STRESS TESTS (IMAGES AND REPORTS) N/A    cMRI (IMAGES AND REPORTS) N/A    CT/CTA (IMAGES AND REPORTS) N/A

## 2023-03-17 ENCOUNTER — TELEPHONE (OUTPATIENT)
Dept: SLEEP MEDICINE | Facility: CLINIC | Age: 43
End: 2023-03-17
Payer: COMMERCIAL

## 2023-03-17 NOTE — TELEPHONE ENCOUNTER
You are scheduled for a CPAP Mask exchange at the [place] showroom on 3/28/23 at 2:30.  The address for the [place] showroom is [address], [address], [place] [address] (in a strip mall on the [address] You will need to ring doorbell at entrance to be buzzed into the building for appointment. The contact number for [facility] is [phone], Monday - Friday, 8:00 am - 4:30 pm. Please call if you need to make any changes to your appointment. Please contact your insurance company prior to appointment to confirm your benefits for durable medical equipment.

## 2023-03-21 ENCOUNTER — OFFICE VISIT (OUTPATIENT)
Dept: CARDIOLOGY | Facility: CLINIC | Age: 43
End: 2023-03-21
Attending: FAMILY MEDICINE
Payer: COMMERCIAL

## 2023-03-21 ENCOUNTER — PRE VISIT (OUTPATIENT)
Dept: CARDIOLOGY | Facility: CLINIC | Age: 43
End: 2023-03-21
Payer: COMMERCIAL

## 2023-03-21 VITALS
WEIGHT: 265.2 LBS | HEART RATE: 100 BPM | SYSTOLIC BLOOD PRESSURE: 133 MMHG | BODY MASS INDEX: 41.62 KG/M2 | OXYGEN SATURATION: 100 % | DIASTOLIC BLOOD PRESSURE: 85 MMHG | HEIGHT: 67 IN

## 2023-03-21 DIAGNOSIS — I47.10 SVT (SUPRAVENTRICULAR TACHYCARDIA) (H): Primary | ICD-10-CM

## 2023-03-21 DIAGNOSIS — I49.3 PVC'S (PREMATURE VENTRICULAR CONTRACTIONS): ICD-10-CM

## 2023-03-21 PROCEDURE — G0463 HOSPITAL OUTPT CLINIC VISIT: HCPCS | Performed by: INTERNAL MEDICINE

## 2023-03-21 PROCEDURE — 99204 OFFICE O/P NEW MOD 45 MIN: CPT | Performed by: INTERNAL MEDICINE

## 2023-03-21 ASSESSMENT — PAIN SCALES - GENERAL: PAINLEVEL: NO PAIN (0)
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
## 2023-03-21 NOTE — NURSING NOTE
Chief Complaint   Patient presents with     New Patient     New cardiology frequent PVCs           Vitals were taken and medications reconciled.     Toni Cespedes, EMT   12:43 PM

## 2023-03-21 NOTE — PATIENT INSTRUCTIONS
March 21, 2023    Cardiology Provider You Saw During Your Visit: Dr. Clark      Medication Changes: None      Follow Up:   -Echocardiogram  -Follow up based on results        Follow the American Heart Association Diet and Lifestyle Recommendations:  -Limit saturated fat, trans fat, sodium, red meat, sweets and sugar-sweetened beverages. If you choose to eat red meat, compare labels and select the leanest cuts available.  -Aim for at least 150 minutes of moderate physical activity or 75 minutes of vigorous physical activity - or an equal combination of both - each week.      To Reach Us:  -During business hours: [phone], press option # 1 to schedule an appointment or to leave a message for your care team.     -After hours, weekends or holidays: [phone], press option #4 and ask to speak to the on-call cardiologist. Inform them you are a patient at the [facility].      Cristiane Hernandez RN  Cardiology Care Coordinator - General Cardiology  [facility]

## 2023-03-21 NOTE — NURSING NOTE
March 21, 2023    Medication Changes: None      Follow Up:   -Echocardiogram  -Follow up based on results      Patient verbalized understanding of all health information given and agreed to call with further questions or concerns.      Cristiane Hernandez RN

## 2023-03-23 ENCOUNTER — DOCUMENTATION ONLY (OUTPATIENT)
Dept: SLEEP MEDICINE | Facility: CLINIC | Age: 43
End: 2023-03-23
Payer: COMMERCIAL

## 2023-03-23 NOTE — PROGRESS NOTES
30 DAY STM VISIT    Diagnostic AHI: 38.8  PSG    PAP settings:  CPAP  95TH % PRESSURE EPR RESMED SOFT RESPONSE SETTING   16 cm  H20 16 cm  H20  ONE OFF     Device type: CPAP  Mask type:  Full Face Mask    Objective measures: 14 day rolling measures:    COMPLIANCE LEAK AHI AVERAGE USE IN MINUTES   0 % 83.7 9.3 17   GOAL >70% GOAL < 24 LPM GOAL <5 GOAL >240        Assessment: Pt not meeting objective benchmarks for AHI, leak and compliance     Message left for patient to return call   Action plan: waiting for patient to return call.  and pt to have 6 month [facility] visit  Patient has scheduled a follow up visit with Dr. Corona on 4/18/23.     Total time spent on accessing and interpreting remote patient PAP therapy data  10 minutes    Total time spent counseling, coaching  and reviewing PAP therapy data with patient  1 minutes     [id number] this call  [phone] no  at 3 or 14 day [facility]

## 2023-03-23 NOTE — PROGRESS NOTES
Patient returned call. Her sister was in the hospital and she was helping her so she was away from her CPAP for some time. She is still having mask leaks. She has an appt to swap masks in a few days. Will follow up with pt next week.

## 2023-03-24 ENCOUNTER — HOSPITAL ENCOUNTER (OUTPATIENT)
Dept: CARDIOLOGY | Facility: CLINIC | Age: 43
Discharge: HOME | End: 2023-03-24
Attending: INTERNAL MEDICINE | Admitting: INTERNAL MEDICINE
Payer: COMMERCIAL

## 2023-03-24 DIAGNOSIS — I47.10 SVT (SUPRAVENTRICULAR TACHYCARDIA) (H): ICD-10-CM

## 2023-03-24 DIAGNOSIS — I49.3 PVC'S (PREMATURE VENTRICULAR CONTRACTIONS): ICD-10-CM

## 2023-03-24 LAB — LVEF ECHO: NORMAL

## 2023-03-24 PROCEDURE — 93306 TTE W/DOPPLER COMPLETE: CPT

## 2023-03-24 PROCEDURE — 93306 TTE W/DOPPLER COMPLETE: CPT | Mod: 26 | Performed by: INTERNAL MEDICINE

## 2023-03-28 ENCOUNTER — DOCUMENTATION ONLY (OUTPATIENT)
Dept: SLEEP MEDICINE | Facility: CLINIC | Age: 43
End: 2023-03-28
Payer: COMMERCIAL

## 2023-03-28 NOTE — PROGRESS NOTES
Patient came to [facility] for mask fitting appointment on March 28, 2023. Patient requested to switch masks because poor seal/leak. Discussed the following masks: F20 Patient selected a Resmed Mask name: F20 Full Face mask size Small.

## 2023-03-31 ENCOUNTER — DOCUMENTATION ONLY (OUTPATIENT)
Dept: SLEEP MEDICINE | Facility: CLINIC | Age: 43
End: 2023-03-31
Payer: COMMERCIAL

## 2023-03-31 NOTE — PROGRESS NOTES
[facility] Recheck. Called patient to check in on their progress with CPAP. She got a new mask 2 nights ago and reports she has been sleeping better with it and feeling more energy. She is happy now with the CPAP. Provided patient my contact information if they have any questions in the future.

## 2023-04-18 ENCOUNTER — OFFICE VISIT (OUTPATIENT)
Dept: SLEEP MEDICINE | Facility: CLINIC | Age: 43
End: 2023-04-18
Payer: COMMERCIAL

## 2023-04-18 VITALS
BODY MASS INDEX: 42.41 KG/M2 | OXYGEN SATURATION: 97 % | WEIGHT: 270.2 LBS | SYSTOLIC BLOOD PRESSURE: 158 MMHG | HEART RATE: 86 BPM | DIASTOLIC BLOOD PRESSURE: 90 MMHG | HEIGHT: 67 IN

## 2023-04-18 DIAGNOSIS — G47.33 OBSTRUCTIVE SLEEP APNEA: Primary | ICD-10-CM

## 2023-04-18 DIAGNOSIS — G47.34 SLEEP RELATED HYPOXIA: ICD-10-CM

## 2023-04-18 PROCEDURE — 99214 OFFICE O/P EST MOD 30 MIN: CPT | Performed by: INTERNAL MEDICINE

## 2023-04-18 ASSESSMENT — SLEEP AND FATIGUE QUESTIONNAIRES
HOW LIKELY ARE YOU TO NOD OFF OR FALL ASLEEP WHILE WATCHING TV: MODERATE CHANCE OF DOZING
HOW LIKELY ARE YOU TO NOD OFF OR FALL ASLEEP IN A CAR, WHILE STOPPED FOR A FEW MINUTES IN TRAFFIC: SLIGHT CHANCE OF DOZING
HOW LIKELY ARE YOU TO NOD OFF OR FALL ASLEEP WHEN YOU ARE A PASSENGER IN A CAR FOR AN HOUR WITHOUT A BREAK: SLIGHT CHANCE OF DOZING
HOW LIKELY ARE YOU TO NOD OFF OR FALL ASLEEP WHILE LYING DOWN TO REST IN THE AFTERNOON WHEN CIRCUMSTANCES PERMIT: HIGH CHANCE OF DOZING
HOW LIKELY ARE YOU TO NOD OFF OR FALL ASLEEP WHILE SITTING AND READING: SLIGHT CHANCE OF DOZING
HOW LIKELY ARE YOU TO NOD OFF OR FALL ASLEEP WHILE SITTING AND TALKING TO SOMEONE: WOULD NEVER DOZE
HOW LIKELY ARE YOU TO NOD OFF OR FALL ASLEEP WHILE SITTING INACTIVE IN A PUBLIC PLACE: SLIGHT CHANCE OF DOZING
HOW LIKELY ARE YOU TO NOD OFF OR FALL ASLEEP WHILE SITTING QUIETLY AFTER LUNCH WITHOUT ALCOHOL: MODERATE CHANCE OF DOZING

## 2023-04-18 NOTE — PROGRESS NOTES
Additional 15 minutes on the date of service was spent performing the following:    -Preparing to see the patient (eg, review of tests)   -Review of other providers medical records.  -Ordering medications, tests, or procedures   -Documenting clinical information in the electronic or other health record     Thank you for the opportunity to participate in the care of Eliana Anders.     She is a 43 year old  female patient who comes to the sleep medicine clinic for review of sleep study results. The study was completed on 01/23/2023 (AHI = 38.8) which showed that the patient had severe obstructive sleep apnea.  The patient was informed of results and offered the option to get an in lab titration study which she agreed to on 02/01/2023.  Optimal pressure was achieved with a CPAP pressure of 18 cm of water.  This is the patient's first clinical visit since getting started on CPAP therapy.  The patient states that since starting CPAP, her sleep quality has dramatically improved.  She does state that the ramp time is a bit too short.  The patient states the reason why she is not using her CPAP more is because her sister was recently hospitalized and she is under a lot of stress.  Otherwise she does enjoy using her CPAP machine.    Assessment and Plan:  In summary Eliana Anders is a 43 year old year old female here for review of sleep study results.  1. Obstructive sleep apnea/Sleep related hypoxia  I encouraged the patient to try to increase her hours of usage insofar as she can.  I will also change her ramp time to 35 minutes.  Return to clinic every 2 years.  - COMPREHENSIVE DME    Compliance Download data for 30 days:  Pressure setting: CPAP 18 CWP  Residual AHI: 3.3 events per hour  Leak: Large  Compliance: 47%  Mask Tolerance: Good  Skin irritation: None  DME: [facility]    Lab reviewed: Discussed with patient.    Cpap Fu Template    Question 4/18/2023 12:40 PM CDT - Filed by  Patient   Do you use a CPAP Machine at home? Yes   Overall, on a scale of 0-10 how would you rate your CPAP?    Is your mask comfortable? Yes   Is your mask leaking? No   Do you notice snoring with mask on? No   Do you notice gasping arousals with mask on? Yes   Are you having significant oral or nasal dryness? Yes   Is the pressure setting comfortable? Yes   What type of mask do you use? Full Face Mask   What is your typical bedtime? 10pm   How long does it take you to go to sleep on PAP therapy? 10 15 minutes   What time do you typically get out of bed for the day? 510 515am on weekdays   How many hours on average per night are you using PAP therapy? 6 to 7   How many hours are you sleeping per night? 6to7   Do you feel well rested in the morning? Yes         MICHELLE:  MICHELLE Total Score: 9  Total score - [place]: 11 (4/18/2023 12:41 PM)    Patient Active Problem List   Diagnosis     Surveillance of previously prescribed intrauterine contraceptive device     CARDIOVASCULAR SCREENING; LDL GOAL LESS THAN 160     ACL tear     Acute meniscal tear of right knee, initial encounter     Acquired defect of articular cartilage     Other orthopedic aftercare(V54.89)     Abnormal gait     Right knee pain     Costochondral chest pain     Pain in thoracic spine     Elevated blood pressure reading without diagnosis of hypertension     Family history of cerebrovascular accident in sister     Pulmonary lesion, right     Class 1 obesity with body mass index (BMI) of 30.0 to 30.9 in adult     Closed fracture of sternum, unspecified portion of sternum, initial encounter     Benign essential hypertension     Seasonal allergic rhinitis, unspecified trigger     Abscess of Bartholin's gland     Morbid obesity (H)     PVC's (premature ventricular contractions)       Current Outpatient Medications   Medication Sig Dispense Refill     Acetaminophen (TYLENOL PO)        amLODIPine (NORVASC) 5 MG tablet Take 1 tablet (5 mg) by mouth daily 90 tablet 1  "    cetirizine (ZYRTEC) 10 MG tablet Take 1 tablet (10 mg) by mouth daily 90 tablet 3     diphenhydrAMINE (BENADRYL) 25 MG tablet Take 1 tablet (25 mg) by mouth every 6 hours as needed for itching or allergies 90 tablet 3     fluticasone (FLONASE) 50 MCG/ACT nasal spray Spray 1 spray into both nostrils daily 16 g 11     ibuprofen (ADVIL/MOTRIN) 200 MG tablet Take 2 tablets (400 mg) by mouth 3 times daily 42 tablet 0     levonorgestrel (MIRENA) 20 MCG/24HR IUD 1 each by Intrauterine route once       albuterol (PROAIR HFA/PROVENTIL HFA/VENTOLIN HFA) 108 (90 Base) MCG/ACT inhaler INHALE 2 PUFFS BY MOUTH FOUR TIMES DAILY AS NEEDED FOR 10 DAYS (Patient not taking: Reported on 4/18/2023)       levonorgestrel (MIRENA) 20 MCG/DAY IUD 1 each (20 mcg) by Intrauterine route once       nicotine (NICORETTE) 2 MG gum Place 1 each (2 mg) inside cheek every hour as needed for smoking cessation (Patient not taking: Reported on 4/18/2023) 100 each 3     varenicline (CHANTIX ANTOINE) 0.5 MG X 11 & 1 MG X 42 tablet Take 0.5 mg tab daily for 3 days, THEN 0.5 mg tab twice daily for 4 days, THEN 1 mg twice daily. (Patient not taking: Reported on 4/18/2023) 53 tablet 0       Allergies   Allergen Reactions     No Known Drug Allergies        Physical Exam:  BP (!) 158/90   Pulse 86   Ht 1.71 m (5' 7.32\")   Wt 122.6 kg (270 lb 3.2 oz)   SpO2 97%   BMI 41.92 kg/m    BMI:Body mass index is 41.92 kg/m .   GEN: NAD,   Head: Normocephalic.  EYES: EOMI  Psych: normal mood, normal affect     Labs/Studies:  - We reviewed the results of the overnight study as described on the HPI.     No results found for: PH, PHARTERIAL, PO2, [id number], SAT, PCO2, HCO3, BASEEXCESS, SUYAPA, BEB  Lab Results   Component Value Date    TSH 0.74 11/16/2018    TSH 1.39 05/20/2014     Lab Results   Component Value Date    GLC 88 02/15/2023    GLC 94 12/20/2019     Lab Results   Component Value Date    HGB 14.1 12/16/2019    HGB 16.0 (H) 09/17/2019     Lab Results "   Component Value Date    BUN 5.2 (L) 02/15/2023    BUN 10 12/20/2019    CR 0.62 02/15/2023    CR 0.7 08/30/2022     Lab Results   Component Value Date    AST 73 (H) 02/15/2023    AST 48 (H) 12/20/2019    ALT 66 (H) 02/15/2023     (H) 12/20/2019     (H) 12/20/2019     (H) 11/29/2018    ALKPHOS 223 (H) 02/15/2023    ALKPHOS 268 (H) 12/20/2019    BILITOTAL 0.3 02/15/2023    BILITOTAL 0.2 12/20/2019    BILICONJ 0.0 12/27/2011     Lab Results   Component Value Date    UAMP  12/27/2011     Negative   Cutoff for a negative amphetamine is 500 ng/mL or less.    UBARB  12/27/2011     Negative   Cutoff for a negative barbiturate is 200 ng/mL or less.    BENZODIAZEUR  12/27/2011     Positive   Cutoff for a positive benzodiazepine is greater than 200 ng/mL. This is an   unconfirmed screening result to be used for medical purposes only.    UCANN  12/27/2011     Positive   Cutoff for a positive cannabinoid is greater than 50 ng/mL. This is an   unconfirmed screening result to be used for medical purposes only.    UCOC  12/27/2011     Positive   Cutoff for a positive cocaine is greater than 300 ng/mL. This is an unconfirmed   screening result to be used for medical purposes only.    OPIT  12/27/2011     Negative   Cutoff for a negative opiate is 300 ng/mL or less.       Recent Labs   Lab Test 02/15/23  1722 08/30/22  1947 12/20/19  1601     --  139   POTASSIUM 4.4  --  3.7   CHLORIDE 106  --  104   CO2 20*  --  29   ANIONGAP 11  --  6   GLC 88  --  94   BUN 5.2*  --  10   CR 0.62 0.7 0.63   CASSIDY 8.3*  --  8.7       No results found for: LUANNE      Patient verbalized understanding of these issues, agrees with the plan and all questions were answered today. Patient was given an opportuntity to voice any other symptoms or concerns not listed above. Patient did not have any other symptoms or concerns.      Maehsh Corona DO  Board Certified in Internal Medicine and Sleep Medicine      (Note created with  Dragon voice recognition and unintended spelling errors and word substitutions may occur)

## 2023-04-18 NOTE — NURSING NOTE
"Chief Complaint   Patient presents with     CPAP Follow Up       Initial BP (!) 158/90   Pulse 86   Ht 1.71 m (5' 7.32\")   Wt 122.6 kg (270 lb 3.2 oz)   SpO2 97%   BMI 41.92 kg/m   Estimated body mass index is 41.92 kg/m  as calculated from the following:    Height as of this encounter: 1.71 m (5' 7.32\").    Weight as of this encounter: 122.6 kg (270 lb 3.2 oz).    Medication Reconciliation: complete    Neck circumference:    DME: [facility]     Future MyChart message sent reminding patient of 2 year follow up appointment.     Ramp pressure changed to 8 cwp over 35 minutes.    Tonya Perez MA    "

## 2023-04-22 ENCOUNTER — HEALTH MAINTENANCE LETTER (OUTPATIENT)
Age: 43
End: 2023-04-22

## 2023-06-29 ENCOUNTER — HOSPITAL ENCOUNTER (EMERGENCY)
Facility: CLINIC | Age: 43
Discharge: HOME | End: 2023-06-29
Attending: EMERGENCY MEDICINE | Admitting: EMERGENCY MEDICINE
Payer: COMMERCIAL

## 2023-06-29 ENCOUNTER — APPOINTMENT (OUTPATIENT)
Dept: GENERAL RADIOLOGY | Facility: CLINIC | Age: 43
End: 2023-06-29
Attending: EMERGENCY MEDICINE
Payer: COMMERCIAL

## 2023-06-29 VITALS
SYSTOLIC BLOOD PRESSURE: 138 MMHG | BODY MASS INDEX: 41.28 KG/M2 | HEIGHT: 67 IN | HEART RATE: 95 BPM | DIASTOLIC BLOOD PRESSURE: 82 MMHG | WEIGHT: 263 LBS | RESPIRATION RATE: 16 BRPM | TEMPERATURE: 98.9 F | OXYGEN SATURATION: 95 %

## 2023-06-29 DIAGNOSIS — S89.92XA KNEE INJURY, LEFT, INITIAL ENCOUNTER: ICD-10-CM

## 2023-06-29 PROCEDURE — 29505 APPLICATION LONG LEG SPLINT: CPT | Mod: LT | Performed by: EMERGENCY MEDICINE

## 2023-06-29 PROCEDURE — 99284 EMERGENCY DEPT VISIT MOD MDM: CPT | Mod: 25 | Performed by: EMERGENCY MEDICINE

## 2023-06-29 PROCEDURE — 73562 X-RAY EXAM OF KNEE 3: CPT | Mod: LT

## 2023-06-29 PROCEDURE — 250N000013 HC RX MED GY IP 250 OP 250 PS 637: Performed by: EMERGENCY MEDICINE

## 2023-06-29 PROCEDURE — 99283 EMERGENCY DEPT VISIT LOW MDM: CPT | Mod: 25 | Performed by: EMERGENCY MEDICINE

## 2023-06-29 RX ORDER — ACETAMINOPHEN 500 MG
1000 TABLET ORAL 3 TIMES DAILY
Qty: 42 TABLET | Refills: 0 | Status: SHIPPED | OUTPATIENT
Start: 2023-06-29 | End: 2023-07-06

## 2023-06-29 RX ORDER — ACETAMINOPHEN 500 MG
1000 TABLET ORAL ONCE
Status: COMPLETED | OUTPATIENT
Start: 2023-06-29 | End: 2023-06-29

## 2023-06-29 RX ORDER — IBUPROFEN 200 MG
400 TABLET ORAL 3 TIMES DAILY
Qty: 42 TABLET | Refills: 0 | Status: SHIPPED | OUTPATIENT
Start: 2023-06-29

## 2023-06-29 RX ORDER — IBUPROFEN 600 MG/1
600 TABLET, FILM COATED ORAL ONCE
Status: COMPLETED | OUTPATIENT
Start: 2023-06-29 | End: 2023-06-29

## 2023-06-29 RX ADMIN — ACETAMINOPHEN 1000 MG: 500 TABLET ORAL at 10:37

## 2023-06-29 RX ADMIN — IBUPROFEN 600 MG: 600 TABLET, FILM COATED ORAL at 10:37

## 2023-06-29 ASSESSMENT — ACTIVITIES OF DAILY LIVING (ADL): ADLS_ACUITY_SCORE: 35

## 2023-06-29 NOTE — LETTER
June 29, 2023      To Whom It May Concern:      Eliana Centeno Bishnu Anders was seen in our Emergency Department today, 06/29/23.  I expect her condition to improve over the next *** days.  She may return to work/school when improved.    Sincerely,        Darcy Bowie MD
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
## 2023-06-29 NOTE — LETTER
June 29, 2023      To Whom It May Concern:      Eliana Centeno Bishnu Anders was seen in our Emergency Department today, 06/29/23.  I expect her condition to improve over the next 7 days.  She may return to work/school when improved.    Sincerely,        Darcy Bowie MD

## 2023-06-29 NOTE — ED TRIAGE NOTES
Patient fell on the kitchen Floor yesterday, Hit left knee on the ground.      Triage Assessment     Row Name 06/29/23 0911       Triage Assessment (Adult)    Airway WDL WDL       Respiratory WDL    Respiratory WDL WDL       Skin Circulation/Temperature WDL    Skin Circulation/Temperature WDL WDL       Cardiac WDL    Cardiac WDL WDL       Peripheral/Neurovascular WDL    Peripheral Neurovascular WDL WDL

## 2023-07-06 ENCOUNTER — OFFICE VISIT (OUTPATIENT)
Dept: ORTHOPEDICS | Facility: CLINIC | Age: 43
End: 2023-07-06
Payer: COMMERCIAL

## 2023-07-06 VITALS
BODY MASS INDEX: 41.28 KG/M2 | HEIGHT: 67 IN | WEIGHT: 263 LBS | SYSTOLIC BLOOD PRESSURE: 142 MMHG | DIASTOLIC BLOOD PRESSURE: 82 MMHG

## 2023-07-06 DIAGNOSIS — S89.92XA INJURY OF LEFT KNEE, INITIAL ENCOUNTER: Primary | ICD-10-CM

## 2023-07-06 PROCEDURE — 99204 OFFICE O/P NEW MOD 45 MIN: CPT | Performed by: FAMILY MEDICINE

## 2023-07-06 RX ORDER — NABUMETONE 500 MG/1
500 TABLET, FILM COATED ORAL 2 TIMES DAILY
Qty: 30 TABLET | Refills: 1 | Status: SHIPPED | OUTPATIENT
Start: 2023-07-06 | End: 2023-10-23

## 2023-07-06 RX ORDER — CYCLOBENZAPRINE HCL 10 MG
10 TABLET ORAL
Qty: 30 TABLET | Refills: 0 | Status: SHIPPED | OUTPATIENT
Start: 2023-07-06 | End: 2023-10-23

## 2023-07-06 NOTE — LETTER
7/6/2023         RE: Eliana Anders  [address]  [facility] [id number]        Dear Colleague,    Thank you for referring your patient, Eliana Anders, to the [facility]. Please see a copy of my visit note below.    ASSESSMENT & PLAN    Eliana was seen today for pain.    Diagnoses and all orders for this visit:    Injury of left knee, initial encounter  -     cyclobenzaprine (FLEXERIL) 10 MG tablet; Take 1 tablet (10 mg) by mouth nightly as needed for muscle spasms  -     nabumetone (RELAFEN) 500 MG tablet; Take 1 tablet (500 mg) by mouth 2 times daily      This issue is acute and Improving.    # Left knee injury: Eliana Anders  was seen today for follow-up on left knee injury occurring on 6/29/2023 after a fall directly on her kneecap. On examination there are positive findings of tenderness to palpation over the patella. Imaging findings showed no fracture on previous x-rays, ultrasound showing knee effusion, no concern for patella fracture. Likely cause of patient's condition due to flare of knee arthritis in the setting of a fall. Other possible conditions contributing to symptoms include occult fracture.  Symptoms overall improving but slowly.  Patient unable to work due to injury of her knee.  Counseled patient on nature of condition and treatment options.  Given this plan as below, follow-up as scheduled on 7/18/2023     Image Findings: No fracture on previous x-rays  Treatment: Activities as tolerated, home exercises given today, crutches as needed  Job letter written off work until 7/18/2023   Medications/Injections: Relafen ordered for pain, Flexeril at night, defer injections for now  Follow-up: 7/18/2023 as scheduled    Paul Tolliver MD  [facility]    -----  Chief Complaint   Patient presents with     Left Knee - Pain       SUBJECTIVE  Eliana Centeno  "Bishnu Anders is a/an 43 year old female who is seen as a self referral, WALK IN for evaluation of left knee injury.     The patient is seen by themselves.      Onset: 6/29/23, 1 week(s) ago. Patient describes injury as slipped on water and fell directly on knee. Seen in ED 6/29/23.   Location of Pain: left anterior knee   Worsened by: knee flexion, weight bearing   Better with: elevation  Treatments tried: elevation, Tylenol, immobilizer, crutches   Associated symptoms: popping, swelling     Orthopedic/Surgical history: NO  Social History/Occupation: [employer]      No family history pertinent to patient's problem today.       REVIEW OF SYSTEMS:  Review of Systems  Constitutional, HEENT, cardiovascular, pulmonary, gi and gu systems are negative, except as otherwise noted.    OBJECTIVE:  BP (!) 142/82   Ht 1.702 m (5' 7\")   Wt 119.3 kg (263 lb)   BMI 41.19 kg/m     General: healthy, alert and in no distress  HEENT: no scleral icterus or conjunctival erythema  Skin: no suspicious lesions or rash. No jaundice.  CV: distal perfusion intact    Resp: normal respiratory effort without conversational dyspnea   Psych: normal mood and affect  Gait: normal steady gait with appropriate coordination and balance    Neuro: Normal light sensory exam of left lower extremity      Ortho Exam   LEFT KNEE  Inspection:    Normal alignment; no edema, erythema, or ecchymosis present  Palpation:    Tender about the lateral patellar facet, medial patellar facet and prepatellar bursa. Remainder of bony and ligamentous landmarks are nontender.    Moderate effusion is present    Patellofemoral crepitus is Present  Range of Motion:     00 extension to 900 flexion  Strength:    Quadriceps 5/5, hamstrings 5/5, gastrocsoleus 5/5 and tibialis anterior 5/5    Extensor mechanism intact  Special Tests:    Positive: Patella grind    Negative: MCL/valgus stress (0 & 30 deg), LCL/varus stress (0 & 30 deg), Lachman's, anterior drawer, posterior " drawer    RADIOLOGY:  I independently ordered, visualized and reviewed these images with the patient    Results for orders placed or performed during the hospital encounter of 06/29/23   XR Knee Left 3 Views    Narrative    KNEE LEFT 3 VIEWS  DATE/TIME: 6/29/2023 10:07 AM     INDICATION: Left knee pain.   COMPARISON: 11/3/2020      Impression    IMPRESSION:  1.  No fracture or joint malalignment.  2.  Moderate left knee degenerative arthrosis, stable. Moderate medial  compartment narrowing. Tricompartmental marginal osteophytosis.  3.  Trace knee joint effusion.  4.  Small ossicle at the superior margin of the patella, unchanged.    CHRISSIE MCCLURE MD         SYSTEM ID:  [id number]         Review of external notes as documented elsewhere in note  Review of the result(s) of each unique test - left knee x-rays     Disclaimer: This note consists of symbols derived from keyboarding, dictation and/or voice recognition software. As a result, there may be errors in the script that have gone undetected. Please consider this when interpreting information found in this chart.        Again, thank you for allowing me to participate in the care of your patient.        Sincerely,        Paul Tolliver MD

## 2023-07-06 NOTE — PROGRESS NOTES
ASSESSMENT & PLAN    Eliana was seen today for pain.    Diagnoses and all orders for this visit:    Injury of left knee, initial encounter  -     cyclobenzaprine (FLEXERIL) 10 MG tablet; Take 1 tablet (10 mg) by mouth nightly as needed for muscle spasms  -     nabumetone (RELAFEN) 500 MG tablet; Take 1 tablet (500 mg) by mouth 2 times daily      This issue is acute and Improving.    # Left knee injury: Eliana Anders  was seen today for follow-up on left knee injury occurring on 6/29/2023 after a fall directly on her kneecap. On examination there are positive findings of tenderness to palpation over the patella. Imaging findings showed no fracture on previous x-rays, ultrasound showing knee effusion, no concern for patella fracture. Likely cause of patient's condition due to flare of knee arthritis in the setting of a fall. Other possible conditions contributing to symptoms include occult fracture.  Symptoms overall improving but slowly.  Patient unable to work due to injury of her knee.  Counseled patient on nature of condition and treatment options.  Given this plan as below, follow-up as scheduled on 7/18/2023     Image Findings: No fracture on previous x-rays  Treatment: Activities as tolerated, home exercises given today, crutches as needed  Job letter written off work until 7/18/2023   Medications/Injections: Relafen ordered for pain, Flexeril at night, defer injections for now  Follow-up: 7/18/2023 as scheduled    Paul Tolliver MD  [facility]    -----  Chief Complaint   Patient presents with     Left Knee - Pain       SUBJECTIVE  Eliana Anders is a/an 43 year old female who is seen as a self referral, WALK IN for evaluation of left knee injury.     The patient is seen by themselves.      Onset: 6/29/23, 1 week(s) ago. Patient describes injury as slipped on water and fell directly on knee. Seen in ED 6/29/23.   Location of Pain: left  "anterior knee   Worsened by: knee flexion, weight bearing   Better with: elevation  Treatments tried: elevation, Tylenol, immobilizer, crutches   Associated symptoms: popping, swelling     Orthopedic/Surgical history: NO  Social History/Occupation: [employer]      No family history pertinent to patient's problem today.       REVIEW OF SYSTEMS:  Review of Systems  Constitutional, HEENT, cardiovascular, pulmonary, gi and gu systems are negative, except as otherwise noted.    OBJECTIVE:  BP (!) 142/82   Ht 1.702 m (5' 7\")   Wt 119.3 kg (263 lb)   BMI 41.19 kg/m     General: healthy, alert and in no distress  HEENT: no scleral icterus or conjunctival erythema  Skin: no suspicious lesions or rash. No jaundice.  CV: distal perfusion intact    Resp: normal respiratory effort without conversational dyspnea   Psych: normal mood and affect  Gait: normal steady gait with appropriate coordination and balance    Neuro: Normal light sensory exam of left lower extremity      Ortho Exam   LEFT KNEE  Inspection:    Normal alignment; no edema, erythema, or ecchymosis present  Palpation:    Tender about the lateral patellar facet, medial patellar facet and prepatellar bursa. Remainder of bony and ligamentous landmarks are nontender.    Moderate effusion is present    Patellofemoral crepitus is Present  Range of Motion:     00 extension to 900 flexion  Strength:    Quadriceps 5/5, hamstrings 5/5, gastrocsoleus 5/5 and tibialis anterior 5/5    Extensor mechanism intact  Special Tests:    Positive: Patella grind    Negative: MCL/valgus stress (0 & 30 deg), LCL/varus stress (0 & 30 deg), Lachman's, anterior drawer, posterior drawer    RADIOLOGY:  I independently ordered, visualized and reviewed these images with the patient    Results for orders placed or performed during the hospital encounter of 06/29/23   XR Knee Left 3 Views    Narrative    KNEE LEFT 3 VIEWS  DATE/TIME: 6/29/2023 10:07 AM     INDICATION: Left knee pain. "   COMPARISON: 11/3/2020      Impression    IMPRESSION:  1.  No fracture or joint malalignment.  2.  Moderate left knee degenerative arthrosis, stable. Moderate medial  compartment narrowing. Tricompartmental marginal osteophytosis.  3.  Trace knee joint effusion.  4.  Small ossicle at the superior margin of the patella, unchanged.    CHRISSIE MCCLURE MD         SYSTEM ID:  [id number]         Review of external notes as documented elsewhere in note  Review of the result(s) of each unique test - left knee x-rays     Disclaimer: This note consists of symbols derived from keyboarding, dictation and/or voice recognition software. As a result, there may be errors in the script that have gone undetected. Please consider this when interpreting information found in this chart.

## 2023-07-06 NOTE — PATIENT INSTRUCTIONS
# Left knee injury: Eliana Anders  was seen today for follow-up on left knee injury occurring on 6/29/2023 after a fall directly on her kneecap. On examination there are positive findings of tenderness to palpation over the patella. Imaging findings showed no fracture on previous x-rays, ultrasound showing knee effusion, no concern for patella fracture. Likely cause of patient's condition due to flare of knee arthritis in the setting of a fall. Other possible conditions contributing to symptoms include occult fracture.  Symptoms overall improving but slowly.  Patient unable to work due to injury of her knee.  Counseled patient on nature of condition and treatment options.  Given this plan as below, follow-up as scheduled on 7/18/2023     Image Findings: No fracture on previous x-rays  Treatment: Activities as tolerated, home exercises given today, crutches as needed  Job letter written off work until 7/18/2023   Medications/Injections: Relafen ordered for pain, Flexeril at night, defer injections for now  Follow-up: 7/18/2023 as scheduled    Please call [phone]   Ask for my team if you have any questions or concerns    If you have not yet received the influenza vaccine but would like to get one, please call  [phone] or you can schedule via [employer]    It was great seeing you today!    Paul Tolliver MD, CAM

## 2023-07-06 NOTE — LETTER
July 6, 2023      Eliana Anders  [address]   [facility] [address]        To Whom It May Concern:    Eliana Anders was seen in our clinic. She is unable to return to work until repeat evaluation scheduled on 7/18/2023.    Sincerely,        Paul Tolliver MD
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
## 2023-07-17 NOTE — TELEPHONE ENCOUNTER
DIAGNOSIS: L knee injury   APPOINTMENT DATE: 07/18/23   NOTES STATUS DETAILS   OFFICE NOTE from other specialist Internal 07/06/23:  - [facility]- Paul Tolliver MD   DISCHARGE REPORT from the ER Internal 06/29/23:  - [facility] Darcy Bangura MD   MEDICATION LIST Internal    XRAYS (IMAGES & REPORTS) Internal 06/29/23:  - XR R Knee- [facility] Darcy Bangura MD

## 2023-07-18 ENCOUNTER — OFFICE VISIT (OUTPATIENT)
Dept: ORTHOPEDICS | Facility: CLINIC | Age: 43
End: 2023-07-18
Payer: COMMERCIAL

## 2023-07-18 ENCOUNTER — PRE VISIT (OUTPATIENT)
Dept: ORTHOPEDICS | Facility: CLINIC | Age: 43
End: 2023-07-18

## 2023-07-18 DIAGNOSIS — M17.12 TRICOMPARTMENT OSTEOARTHRITIS OF LEFT KNEE: Primary | ICD-10-CM

## 2023-07-18 PROCEDURE — 20610 DRAIN/INJ JOINT/BURSA W/O US: CPT | Mod: LT | Performed by: ORTHOPAEDIC SURGERY

## 2023-07-18 RX ADMIN — LIDOCAINE HYDROCHLORIDE 4 ML: 10 INJECTION, SOLUTION EPIDURAL; INFILTRATION; INTRACAUDAL; PERINEURAL at 11:24

## 2023-07-18 RX ADMIN — TRIAMCINOLONE ACETONIDE 40 MG: 40 INJECTION, SUSPENSION INTRA-ARTICULAR; INTRAMUSCULAR at 11:24

## 2023-07-18 NOTE — NURSING NOTE
[address] [address]  Dept: [phone]  ______________________________________________________________________________    Patient: Eliana Anders   : 1980   MRN: [id number]   2023    INVASIVE PROCEDURE SAFETY CHECKLIST    Date: 23   Procedure: Left knee IA aspiration and CSI  Patient Name: Eliana Anders  MRN: [id number]  YOB: 1980    Action: Complete sections as appropriate. Any discrepancy results in a HARD COPY until resolved.     PRE PROCEDURE:  Patient ID verified with 2 identifiers (name and  or MRN): Yes  Procedure and site verified with patient/designee (when able): Yes  Accurate consent documentation in medical record: Yes  H&P (or appropriate assessment) documented in medical record: Yes  H&P must be up to 20 days prior to procedure and updates within 24 hours of procedure as applicable: NA  Relevant diagnostic and radiology test results appropriately labeled and displayed as applicable: Yes  Procedure site(s) marked with provider initials: NA    TIMEOUT:  Time-Out performed immediately prior to starting procedure, including verbal and active participation of all team members addressing the following:Yes  * Correct patient identify  * Confirmed that the correct side and site are marked  * An accurate procedure consent form  * Agreement on the procedure to be done  * Correct patient position  * Relevant images and results are properly labeled and appropriately displayed  * The need to administer antibiotics or fluids for irrigation purposes during the procedure as applicable   * Safety precautions based on patient history or medication use    DURING PROCEDURE: Verification of correct person, site, and procedures any time the responsibility for care of the patient is transferred to another member of the care team.       Prior to injection, verified patient identity using  patient's name and date of birth.  Due to injection administration, patient instructed to remain in clinic for 15 minutes  afterwards, and to report any adverse reaction to me immediately.    Joint injection was performed.      Drug Amount Wasted:  None.  Vial/Syringe: Single dose vial  Expiration Date:  3/1/25      Noah Hampton, ATC  July 18, 2023
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
## 2023-07-18 NOTE — LETTER
[facility] [address]  [address]LO[address]  [facility] [address] [phone]      7/18/2023    Re: Eliana Anders      TO WHOM IT MAY CONCERN:    Eliana Anders was seen on 7/18/23 for an injury that occurred on 6/28/23. Please excuse her until 8/1/23 due to injury.    Cordially,        Jose Romero MD

## 2023-07-25 RX ORDER — TRIAMCINOLONE ACETONIDE 40 MG/ML
40 INJECTION, SUSPENSION INTRA-ARTICULAR; INTRAMUSCULAR
Status: SHIPPED | OUTPATIENT
Start: 2023-07-18

## 2023-07-25 RX ORDER — LIDOCAINE HYDROCHLORIDE 10 MG/ML
4 INJECTION, SOLUTION EPIDURAL; INFILTRATION; INTRACAUDAL; PERINEURAL
Status: SHIPPED | OUTPATIENT
Start: 2023-07-18

## 2023-08-01 ENCOUNTER — OFFICE VISIT (OUTPATIENT)
Dept: ORTHOPEDICS | Facility: CLINIC | Age: 43
End: 2023-08-01
Payer: COMMERCIAL

## 2023-08-01 DIAGNOSIS — M17.12 TRICOMPARTMENT OSTEOARTHRITIS OF LEFT KNEE: Primary | ICD-10-CM

## 2023-08-01 PROCEDURE — 99212 OFFICE O/P EST SF 10 MIN: CPT | Performed by: ORTHOPAEDIC SURGERY

## 2023-08-01 NOTE — LETTER
[facility] [place]  [address]  [address]LO[address]  [place] [address] [phone]        August 1, 2023    Regarding:  Eliana Anders  [address]   [place] [address]              To Whom It May Concern;   Eliana Anders was seen and evaluated today in the [facility] at the [facility].  She is much improved post cortisone injection and is capable of returning to work without restrictions beginning 7/2/2023, after clearance by work Physician.          Sincerely,        Jose Romero MD
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
## 2023-10-05 DIAGNOSIS — I10 BENIGN ESSENTIAL HYPERTENSION: ICD-10-CM

## 2023-10-05 RX ORDER — AMLODIPINE BESYLATE 5 MG/1
5 TABLET ORAL DAILY
Qty: 90 TABLET | Refills: 1 | Status: CANCELLED | OUTPATIENT
Start: 2023-10-05

## 2023-10-05 NOTE — TELEPHONE ENCOUNTER
"Requested Prescriptions   Pending Prescriptions Disp Refills    amLODIPine (NORVASC) 5 MG tablet 90 tablet 1     Sig: Take 1 tablet (5 mg) by mouth daily       Calcium Channel Blockers Protocol  Failed - 10/5/2023  1:56 PM        Failed - Blood pressure under 140/90 in past 12 months     BP Readings from Last 3 Encounters:   07/06/23 (!) 142/82   06/29/23 138/82   04/18/23 (!) 158/90                 Passed - Recent (12 mo) or future (30 days) visit within the authorizing provider's specialty     Patient has had an office visit with the authorizing provider or a provider within the authorizing providers department within the previous 12 mos or has a future within next 30 days. See \"Patient Info\" tab in inbasket, or \"Choose Columns\" in Meds & Orders section of the refill encounter.              Passed - Medication is active on med list        Passed - Patient is age 18 or older        Passed - No active pregnancy on record        Passed - Normal serum creatinine on file in past 12 months     Recent Labs   Lab Test 02/15/23  1722 08/30/22  1947 11/03/20  1926   CR 0.62   < >  --    CREAT  --   --  0.6    < > = values in this interval not displayed.       Ok to refill medication if creatinine is low          Passed - No positive pregnancy test in past 12 months           Routing refill request to provider for review/approval because:  Drug not on the [facility] refill protocol   Ej Hargrove RN  [facility]         "

## 2023-10-05 NOTE — TELEPHONE ENCOUNTER
Pending Prescriptions:                       Disp   Refills    amLODIPine (NORVASC) 5 MG tablet          90 tab*1            Sig: Take 1 tablet (5 mg) by mouth daily

## 2023-10-06 NOTE — TELEPHONE ENCOUNTER
Needs visit - BP note controlled on last couple measurements and last FP appt was in Feb.  If she has an at home BP cuff and can do measurements there, as well as can go to lab, I'd consider a video visit if she wanted for sooner access. I can refill the med when we have that scheduled.  PIA Davis, JARENP

## 2023-10-16 DIAGNOSIS — I10 BENIGN ESSENTIAL HYPERTENSION: ICD-10-CM

## 2023-10-16 RX ORDER — AMLODIPINE BESYLATE 5 MG/1
5 TABLET ORAL DAILY
Qty: 15 TABLET | Refills: 0 | Status: SHIPPED | OUTPATIENT
Start: 2023-10-16 | End: 2023-10-23

## 2023-10-16 NOTE — TELEPHONE ENCOUNTER
Asked for appt on 10/5 - no response. Filled #15 and noted on rx that due for appt. ALEKSANDER Ocampo

## 2023-10-23 ENCOUNTER — OFFICE VISIT (OUTPATIENT)
Dept: FAMILY MEDICINE | Facility: CLINIC | Age: 43
End: 2023-10-23
Payer: COMMERCIAL

## 2023-10-23 VITALS
TEMPERATURE: 98.2 F | HEIGHT: 67 IN | DIASTOLIC BLOOD PRESSURE: 83 MMHG | BODY MASS INDEX: 43.38 KG/M2 | HEART RATE: 107 BPM | RESPIRATION RATE: 23 BRPM | OXYGEN SATURATION: 98 % | WEIGHT: 276.4 LBS | SYSTOLIC BLOOD PRESSURE: 132 MMHG

## 2023-10-23 DIAGNOSIS — H92.03 OTALGIA OF BOTH EARS: ICD-10-CM

## 2023-10-23 DIAGNOSIS — I10 BENIGN ESSENTIAL HYPERTENSION: Primary | ICD-10-CM

## 2023-10-23 DIAGNOSIS — F43.9 STRESS: ICD-10-CM

## 2023-10-23 DIAGNOSIS — J30.2 SEASONAL ALLERGIC RHINITIS, UNSPECIFIED TRIGGER: ICD-10-CM

## 2023-10-23 PROCEDURE — 99215 OFFICE O/P EST HI 40 MIN: CPT | Performed by: NURSE PRACTITIONER

## 2023-10-23 RX ORDER — AMLODIPINE BESYLATE 5 MG/1
5 TABLET ORAL DAILY
Qty: 90 TABLET | Refills: 1 | Status: SHIPPED | OUTPATIENT
Start: 2023-10-23

## 2023-10-23 RX ORDER — FLUTICASONE PROPIONATE 50 MCG
1 SPRAY, SUSPENSION (ML) NASAL DAILY
Qty: 16 G | Refills: 4 | Status: SHIPPED | OUTPATIENT
Start: 2023-10-23

## 2023-10-23 RX ORDER — CETIRIZINE HYDROCHLORIDE 10 MG/1
10 TABLET ORAL DAILY
Qty: 90 TABLET | Refills: 3 | Status: SHIPPED | OUTPATIENT
Start: 2023-10-23

## 2023-10-23 ASSESSMENT — PAIN SCALES - GENERAL: PAINLEVEL: SEVERE PAIN (7)

## 2023-10-23 NOTE — PROGRESS NOTES
"  Assessment & Plan         Benign essential hypertension  Blood pressure below goal of 140/90. Ideally, I'd like to see lower. Patient is not able to check BP at home right now. No medication side effects. Will continue amlodipine 5 mg at this time. Labs due, but declines today.  - amLODIPine (NORVASC) 5 MG tablet; Take 1 tablet (5 mg) by mouth daily    Seasonal allergic rhinitis, unspecified trigger  No evidence for AOM. Does have clear effusion likely 2/2 uncontrolled allergies. Discussed etiology and treatment with more consistent flonase and cetirizine.   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 Feeling grief and also additional burdens of managing households, work, kids.   Not interested in anti-depressant and I am not confident that she would carry a diagnosis of depression. Has access to therapy through work and support via her [employer], as well as extended family.    Due for pap - declined to schedule physical today related to stress  Declined immunizations today      Prescription drug management  I spent a total of 47 minutes on the day of the visit.   Time spent by me doing chart review, history and exam, documentation and further activities per the note        Patient Instructions   Take the flonase nasal spray daily   Take the cetirizine allergy pill daily  Nasal saline spray when you can think of it    You can take any anti-histamine as long a it doesn't have \"D\" after it.  Look for loratidine, cetirizine or fenofexadine - doesn't matter which.  Also add the nasal steroid fluticasone.  I usually find that adding a steroid nasal spray helps a lot with all symptoms.  One trick with using these is to only insert the nozzle slightly and then tip to " point toward your eye rather than straight back.  Do not take a deep breath with administration.  These measures keep it from going back into your throat where it doesn't work, can irritate your throat and tastes awful.       Hepatitis B Vaccine: What You Need to Know  Why get vaccinated?  Hepatitis B vaccine can prevent hepatitis B.  Hepatitis B is a liver disease that can cause mild illness lasting a few weeks, or it can lead to a serious, lifelong illness.  Acute hepatitis B is a short-term illness that can lead to fever, fatigue, loss of appetite, nausea, vomiting, jaundice (yellow skin or eyes, dark urine, lubna-colored bowel movements), and pain in the muscles, joints, and stomach.  Chronic hepatitis B is a long-term illness that occurs when the hepatitis B virus remains in a person's body. Most people who go on to develop chronic hepatitis B do not have symptoms, but it is still very serious and can lead to liver damage (cirrhosis), liver cancer, and death. Chronically infected people can spread hepatitis B virus to others, even if they do not feel or look sick themselves.  Hepatitis B is spread when blood, semen, or other body fluid infected with the hepatitis B virus enters the body of a person who is not infected. People can become infected through:  Birth (if a pregnant person has hepatitis B, their baby can become infected)  Sharing items such as razors or toothbrushes with an infected person  Contact with the blood or open sores of an infected person  Sex with an infected partner  Sharing needles, syringes, or other drug-injection equipment  Exposure to blood from needlesticks or other sharp instruments  Most people who are vaccinated with hepatitis B vaccine are immune for life.  Hepatitis B vaccine  Hepatitis B vaccine is usually given as 2, 3, or 4 shots.  Infants should get their first dose of hepatitis B vaccine at birth and will usually complete the series at 6-18 months of age. The birth dose of  hepatitis B vaccine is an important part of preventing longterm illness in infants and the spread of hepatitis B in the [place].  Anyone 59 years of age or younger who has not yet gotten the vaccine should be vaccinated.  Hepatitis B vaccination is recommended for adults 60 years or older at increased risk of exposure to hepatitis B who were not vaccinated previously. Adults 60 years or older who are not at increased risk and were not vaccinated in the past may also be vaccinated.  Hepatitis B vaccine may be given as a stand-alone vaccine, or as part of a combination vaccine (a type of vaccine that combines more than one vaccine together into one shot).  Hepatitis B vaccine may be given at the same time as other vaccines.  Talk with your health care provider  Tell your vaccination provider if the person getting the vaccine:  Has had an allergic reaction after a previous dose of hepatitis B vaccine, or has any severe, lifethreatening allergies  In some cases, your health care provider may decide to postpone hepatitis B vaccination until a future visit.  Pregnant or breastfeeding people who were not vaccinated previously should be vaccinated. Pregnancy or breastfeeding are not reasons to avoid hepatitis B vaccination.  People with minor illnesses, such as a cold, may be vaccinated. People who are moderately or severely ill should usually wait until they recover before getting hepatitis B vaccine.  Your health care provider can give you more information.  Risks of a vaccine reaction  Soreness where the shot is given, fever, headache, and fatigue (feeling tired) can happen after hepatitis B vaccination.  People sometimes faint after medical procedures, including vaccination. Tell your provider if you feel dizzy or have vision changes or ringing in the ears.  As with any medicine, there is a very remote chance of a vaccine causing a severe allergic reaction, other serious injury, or death.  What if there is a  serious problem?  An allergic reaction could occur after the vaccinated person leaves the clinic. If you see signs of a severe allergic reaction (hives, swelling of the face and throat, difficulty breathing, a fast heartbeat, dizziness, or weakness), call 9-1-1 and get the person to the nearest hospital.  For other signs that concern you, call your health care provider.  Adverse reactions should be reported to the Vaccine Adverse Event Reporting System (VAERS). Your health care provider will usually file this report, or you can do it yourself. Visit the VAERS website at www.vaers.[facility].gov or call 1-484.452.5403. VAERS is only for reporting reactions, and VAERS staff members do not give medical advice.  The National Vaccine Injury Compensation Program  The National Vaccine Injury Compensation Program (VICP) is a federal program that was created to compensate people who may have been injured by certain vaccines. Claims regarding alleged injury or death due to vaccination have a time limit for filing, which may be as short as two years. Visit the VICP website at www.hrsa.gov/vaccinecompensation or call 1-419.598.7320 to learn about the program and about filing a claim.  How can I learn more?  Ask your health care provider.  Call your local or state health department.  Visit the website of the Food and Drug Administration (FDA) for vaccine package inserts and additional information at www.fda.gov/vaccines-blood-biologics/vaccines.  Contact the Centers for Disease Control and Prevention (CDC):  Call 1-531.408.3644 (9-439-GPB-INFO) or  Visit CDC's website at www.cdc.gov/vaccines.  Vaccine Information Statement  Hepatitis B Vaccine  05/12/2023  [place]   300aa-26  U.S. Department of Health and Human Services  Centers for Disease Control and Prevention  Many vaccine information statements are available in [place] and other languages. See www.immunize.org/vis  Hojas de información sobre vacunas están disponibles en español  y en muchos otros idiomas. Visite www.immunize.org/vis  Care instructions adapted under license by your healthcare professional. If you have questions about a medical condition or this instruction, always ask your healthcare professional. [employer] disclaims any warranty or liability for your use of this information.      Martha Davis, CARISA CNP  [facility]    Chalino Monroy is a 43 year old, presenting for the following health issues:  Hypertension and Derm Problem        10/23/2023     2:00 PM   Additional Questions   Roomed by Priti Saini       History of Present Illness       Hypertension: She presents for follow up of hypertension.  She does not check blood pressure  regularly outside of the clinic. Outside blood pressures have been over 140/90. She follows a low salt diet.     She eats 4 or more servings of fruits and vegetables daily.She consumes 2 sweetened beverage(s) daily.She exercises with enough effort to increase her heart rate 9 or less minutes per day.  She exercises with enough effort to increase her heart rate 3 or less days per week. She is missing 1 dose(s) of medications per week.  She is not taking prescribed medications regularly due to remembering to take.     Not taking blood pressure at home. Having headaches. No vision changes, chest pain, SOB, peripheral swelling.  Sister is really sick and helping out with her a lot. Feeling really stressed. Continuing work at [employer] - physical job.  Both ears are hurting for awhile, R>L. Had double ear infection last year when she had the flu. Feels like it never really recovered. Having runny nose from allergies. No congestion or ST. No fever. Taking flonase and cetirizine and benadryl, but not regularly daily.   Knees are bothering her - had injection in the right knee which helped a lot and considering doing injection in left knee.  Right hand - new lesion cannot recall when it showed up. Not  "painful.    BP Readings from Last 6 Encounters:   10/23/23 132/83   07/06/23 (!) 142/82 - taken when had knee injury   06/29/23 138/82   04/18/23 (!) 158/90   03/21/23 133/85   02/15/23 132/83       Review of Systems   Constitutional, HEENT, cardiovascular, pulmonary, gi and gu systems are negative, except as otherwise noted.      Objective    /83 (BP Location: Right arm, Patient Position: Sitting, Cuff Size: Adult Large)   Pulse 107   Temp 98.2  F (36.8  C) (Temporal)   Resp 23   Ht 1.702 m (5' 7\")   Wt 125.4 kg (276 lb 6.4 oz)   LMP  (LMP Unknown)   SpO2 98%   BMI 43.29 kg/m    Body mass index is 43.29 kg/m .  Physical Exam   GENERAL: healthy, alert and no distress  EYES: Eyes grossly normal to inspection, PERRL and conjunctivae and sclerae normal  HENT: normal cephalic/atraumatic, both ears: clear effusion, nasal mucosa edematous , oropharynx clear, oral mucous membranes moist, and tonsillar hypertrophy  NECK: no adenopathy, no asymmetry, masses, or scars and thyroid normal to palpation  RESP: lungs clear to auscultation - no rales, rhonchi or wheezes  CV: regular rate and rhythm, normal S1 S2, no S3 or S4, no murmur, click or rub, no peripheral edema and peripheral pulses strong  MS: no gross musculoskeletal defects noted, no edema  SKIN: no suspicious lesions or rashes  PSYCH: mentation appears normal, affect normal/bright, tearful, fatigued, judgement and insight intact, and appearance well groomed                "

## 2023-10-23 NOTE — PATIENT INSTRUCTIONS
"Take the flonase nasal spray daily   Take the cetirizine allergy pill daily  Nasal saline spray when you can think of it    You can take any anti-histamine as long a it doesn't have \"D\" after it.  Look for loratidine, cetirizine or fenofexadine - doesn't matter which.  Also add the nasal steroid fluticasone.  I usually find that adding a steroid nasal spray helps a lot with all symptoms.  One trick with using these is to only insert the nozzle slightly and then tip to point toward your eye rather than straight back.  Do not take a deep breath with administration.  These measures keep it from going back into your throat where it doesn't work, can irritate your throat and tastes awful.       Hepatitis B Vaccine: What You Need to Know  Why get vaccinated?  Hepatitis B vaccine can prevent hepatitis B.  Hepatitis B is a liver disease that can cause mild illness lasting a few weeks, or it can lead to a serious, lifelong illness.  Acute hepatitis B is a short-term illness that can lead to fever, fatigue, loss of appetite, nausea, vomiting, jaundice (yellow skin or eyes, dark urine, lubna-colored bowel movements), and pain in the muscles, joints, and stomach.  Chronic hepatitis B is a long-term illness that occurs when the hepatitis B virus remains in a person's body. Most people who go on to develop chronic hepatitis B do not have symptoms, but it is still very serious and can lead to liver damage (cirrhosis), liver cancer, and death. Chronically infected people can spread hepatitis B virus to others, even if they do not feel or look sick themselves.  Hepatitis B is spread when blood, semen, or other body fluid infected with the hepatitis B virus enters the body of a person who is not infected. People can become infected through:  Birth (if a pregnant person has hepatitis B, their baby can become infected)  Sharing items such as razors or toothbrushes with an infected person  Contact with the blood or open sores of an " infected person  Sex with an infected partner  Sharing needles, syringes, or other drug-injection equipment  Exposure to blood from needlesticks or other sharp instruments  Most people who are vaccinated with hepatitis B vaccine are immune for life.  Hepatitis B vaccine  Hepatitis B vaccine is usually given as 2, 3, or 4 shots.  Infants should get their first dose of hepatitis B vaccine at birth and will usually complete the series at 6-18 months of age. The birth dose of hepatitis B vaccine is an important part of preventing longterm illness in infants and the spread of hepatitis B in the [place].  Anyone 59 years of age or younger who has not yet gotten the vaccine should be vaccinated.  Hepatitis B vaccination is recommended for adults 60 years or older at increased risk of exposure to hepatitis B who were not vaccinated previously. Adults 60 years or older who are not at increased risk and were not vaccinated in the past may also be vaccinated.  Hepatitis B vaccine may be given as a stand-alone vaccine, or as part of a combination vaccine (a type of vaccine that combines more than one vaccine together into one shot).  Hepatitis B vaccine may be given at the same time as other vaccines.  Talk with your health care provider  Tell your vaccination provider if the person getting the vaccine:  Has had an allergic reaction after a previous dose of hepatitis B vaccine, or has any severe, lifethreatening allergies  In some cases, your health care provider may decide to postpone hepatitis B vaccination until a future visit.  Pregnant or breastfeeding people who were not vaccinated previously should be vaccinated. Pregnancy or breastfeeding are not reasons to avoid hepatitis B vaccination.  People with minor illnesses, such as a cold, may be vaccinated. People who are moderately or severely ill should usually wait until they recover before getting hepatitis B vaccine.  Your health care provider can give you more  information.  Risks of a vaccine reaction  Soreness where the shot is given, fever, headache, and fatigue (feeling tired) can happen after hepatitis B vaccination.  People sometimes faint after medical procedures, including vaccination. Tell your provider if you feel dizzy or have vision changes or ringing in the ears.  As with any medicine, there is a very remote chance of a vaccine causing a severe allergic reaction, other serious injury, or death.  What if there is a serious problem?  An allergic reaction could occur after the vaccinated person leaves the clinic. If you see signs of a severe allergic reaction (hives, swelling of the face and throat, difficulty breathing, a fast heartbeat, dizziness, or weakness), call 9-1-1 and get the person to the nearest hospital.  For other signs that concern you, call your health care provider.  Adverse reactions should be reported to the Vaccine Adverse Event Reporting System (VAERS). Your health care provider will usually file this report, or you can do it yourself. Visit the VAERS website at www.vaers.[facility].gov or call 1-343.644.6103. VAERS is only for reporting reactions, and VAERS staff members do not give medical advice.  The National Vaccine Injury Compensation Program  The National Vaccine Injury Compensation Program (VICP) is a federal program that was created to compensate people who may have been injured by certain vaccines. Claims regarding alleged injury or death due to vaccination have a time limit for filing, which may be as short as two years. Visit the VICP website at www.hrsa.gov/vaccinecompensation or call 1-256.441.7981 to learn about the program and about filing a claim.  How can I learn more?  Ask your health care provider.  Call your local or state health department.  Visit the website of the Food and Drug Administration (FDA) for vaccine package inserts and additional information at www.fda.gov/vaccines-blood-biologics/vaccines.  Contact the Centers for  Disease Control and Prevention (CDC):  Call 1-496.444.7745 (2-107-GFH-INFO) or  Visit CDC's website at www.cdc.gov/vaccines.  Vaccine Information Statement  Hepatitis B Vaccine  05/12/2023  42 [place]   300aa-26  U.S. Department of Health and Human Services  Centers for Disease Control and Prevention  Many vaccine information statements are available in [place] and other languages. See www.immunize.org/vis  Hojas de información sobre vacunas están disponibles en español y en muchos otros idiomas. Visite www.immunize.org/vis  Care instructions adapted under license by your healthcare professional. If you have questions about a medical condition or this instruction, always ask your healthcare professional. [employer] disclaims any warranty or liability for your use of this information.

## 2024-02-10 ENCOUNTER — HEALTH MAINTENANCE LETTER (OUTPATIENT)
Age: 44
End: 2024-02-10

## 2024-02-14 ENCOUNTER — PATIENT OUTREACH (OUTPATIENT)
Dept: CARE COORDINATION | Facility: CLINIC | Age: 44
End: 2024-02-14
Payer: COMMERCIAL

## 2024-02-27 DIAGNOSIS — J30.2 SEASONAL ALLERGIC RHINITIS, UNSPECIFIED TRIGGER: ICD-10-CM

## 2024-02-27 RX ORDER — FLUTICASONE PROPIONATE 50 MCG
1 SPRAY, SUSPENSION (ML) NASAL DAILY
Qty: 16 G | Refills: 11 | OUTPATIENT
Start: 2024-02-27

## 2024-03-15 DIAGNOSIS — J30.2 SEASONAL ALLERGIC RHINITIS, UNSPECIFIED TRIGGER: ICD-10-CM

## 2024-03-15 RX ORDER — FLUTICASONE PROPIONATE 50 MCG
1 SPRAY, SUSPENSION (ML) NASAL DAILY
Qty: 16 G | Refills: 4 | OUTPATIENT
Start: 2024-03-15

## 2024-04-20 ENCOUNTER — HEALTH MAINTENANCE LETTER (OUTPATIENT)
Age: 44
End: 2024-04-20

## 2024-06-12 ENCOUNTER — TRANSFERRED RECORDS (OUTPATIENT)
Dept: HEALTH INFORMATION MANAGEMENT | Facility: CLINIC | Age: 44
End: 2024-06-12

## 2024-07-24 ENCOUNTER — TRANSFERRED RECORDS (OUTPATIENT)
Dept: HEALTH INFORMATION MANAGEMENT | Facility: CLINIC | Age: 44
End: 2024-07-24

## 2024-08-13 ENCOUNTER — PATIENT OUTREACH (OUTPATIENT)
Dept: CARE COORDINATION | Facility: CLINIC | Age: 44
End: 2024-08-13
Payer: COMMERCIAL

## 2024-09-03 ENCOUNTER — TRANSFERRED RECORDS (OUTPATIENT)
Dept: HEALTH INFORMATION MANAGEMENT | Facility: CLINIC | Age: 44
End: 2024-09-03

## 2024-09-10 ENCOUNTER — LAB (OUTPATIENT)
Dept: LAB | Facility: CLINIC | Age: 44
End: 2024-09-10
Payer: COMMERCIAL

## 2024-09-10 DIAGNOSIS — M25.539 PAIN IN WRIST JOINT: Primary | ICD-10-CM

## 2024-09-10 LAB
B BURGDOR IGG+IGM SER QL: 0.12
BASOPHILS # BLD AUTO: 0 10E3/UL (ref 0–0.2)
BASOPHILS NFR BLD AUTO: 1 %
CRP SERPL HS-MCNC: 1.47 MG/L
EOSINOPHIL # BLD AUTO: 0.1 10E3/UL (ref 0–0.7)
EOSINOPHIL NFR BLD AUTO: 1 %
ERYTHROCYTE [SEDIMENTATION RATE] IN BLOOD BY WESTERGREN METHOD: 27 MM/HR (ref 0–20)
IMM GRANULOCYTES # BLD: 0 10E3/UL
IMM GRANULOCYTES NFR BLD: 0 %
LYMPHOCYTES # BLD AUTO: 1.9 10E3/UL (ref 0.8–5.3)
LYMPHOCYTES NFR BLD AUTO: 27 %
MONOCYTES # BLD AUTO: 0.5 10E3/UL (ref 0–1.3)
MONOCYTES NFR BLD AUTO: 8 %
NEUTROPHILS # BLD AUTO: 4.5 10E3/UL (ref 1.6–8.3)
NEUTROPHILS NFR BLD AUTO: 63 %
NRBC # BLD AUTO: 0 10E3/UL
NRBC BLD AUTO-RTO: 0 /100
RHEUMATOID FACT SERPL-ACNC: <10 IU/ML
T4 SERPL-MCNC: 6.7 UG/DL (ref 4.5–11.7)
TSH SERPL DL<=0.005 MIU/L-ACNC: 1.81 UIU/ML (ref 0.3–4.2)
URATE SERPL-MCNC: 4.9 MG/DL (ref 2.4–5.7)
WBC # BLD AUTO: 7.1 10E3/UL (ref 4–11)

## 2024-09-10 PROCEDURE — 36415 COLL VENOUS BLD VENIPUNCTURE: CPT

## 2024-09-10 PROCEDURE — 84550 ASSAY OF BLOOD/URIC ACID: CPT

## 2024-09-10 PROCEDURE — 86038 ANTINUCLEAR ANTIBODIES: CPT

## 2024-09-10 PROCEDURE — 84443 ASSAY THYROID STIM HORMONE: CPT

## 2024-09-10 PROCEDURE — 84436 ASSAY OF TOTAL THYROXINE: CPT

## 2024-09-10 PROCEDURE — 86618 LYME DISEASE ANTIBODY: CPT

## 2024-09-10 PROCEDURE — 85048 AUTOMATED LEUKOCYTE COUNT: CPT

## 2024-09-10 PROCEDURE — 85652 RBC SED RATE AUTOMATED: CPT

## 2024-09-10 PROCEDURE — 86431 RHEUMATOID FACTOR QUANT: CPT

## 2024-09-10 PROCEDURE — 86141 C-REACTIVE PROTEIN HS: CPT

## 2024-09-11 LAB — ANA SER QL IF: NEGATIVE

## 2024-09-24 ENCOUNTER — TRANSFERRED RECORDS (OUTPATIENT)
Dept: HEALTH INFORMATION MANAGEMENT | Facility: CLINIC | Age: 44
End: 2024-09-24

## 2024-11-04 ENCOUNTER — TRANSFERRED RECORDS (OUTPATIENT)
Dept: HEALTH INFORMATION MANAGEMENT | Facility: CLINIC | Age: 44
End: 2024-11-04
Payer: COMMERCIAL

## 2024-12-17 ENCOUNTER — TRANSFERRED RECORDS (OUTPATIENT)
Dept: HEALTH INFORMATION MANAGEMENT | Facility: CLINIC | Age: 44
End: 2024-12-17
Payer: COMMERCIAL

## 2025-02-11 ENCOUNTER — TRANSFERRED RECORDS (OUTPATIENT)
Dept: HEALTH INFORMATION MANAGEMENT | Facility: CLINIC | Age: 45
End: 2025-02-11

## 2025-02-25 ENCOUNTER — OFFICE VISIT (OUTPATIENT)
Dept: FAMILY MEDICINE | Facility: CLINIC | Age: 45
End: 2025-02-25
Payer: OTHER MISCELLANEOUS

## 2025-02-25 VITALS
BODY MASS INDEX: 44.1 KG/M2 | SYSTOLIC BLOOD PRESSURE: 137 MMHG | OXYGEN SATURATION: 99 % | HEIGHT: 68 IN | DIASTOLIC BLOOD PRESSURE: 84 MMHG | WEIGHT: 291 LBS | TEMPERATURE: 97 F | HEART RATE: 97 BPM

## 2025-02-25 DIAGNOSIS — M19.92 POST-TRAUMATIC ARTHROSIS OF JOINT: ICD-10-CM

## 2025-02-25 DIAGNOSIS — J30.2 SEASONAL ALLERGIC RHINITIS, UNSPECIFIED TRIGGER: ICD-10-CM

## 2025-02-25 DIAGNOSIS — I10 BENIGN ESSENTIAL HYPERTENSION: ICD-10-CM

## 2025-02-25 DIAGNOSIS — Z13.220 LIPID SCREENING: ICD-10-CM

## 2025-02-25 DIAGNOSIS — G56.22 CUBITAL TUNNEL SYNDROME ON LEFT: ICD-10-CM

## 2025-02-25 DIAGNOSIS — G47.33 OSA (OBSTRUCTIVE SLEEP APNEA): ICD-10-CM

## 2025-02-25 DIAGNOSIS — Z01.818 PRE-OPERATIVE EXAMINATION: Primary | ICD-10-CM

## 2025-02-25 DIAGNOSIS — I47.29 OTHER VENTRICULAR TACHYCARDIA (H): ICD-10-CM

## 2025-02-25 PROCEDURE — 99214 OFFICE O/P EST MOD 30 MIN: CPT | Performed by: NURSE PRACTITIONER

## 2025-02-25 PROCEDURE — 93000 ELECTROCARDIOGRAM COMPLETE: CPT | Performed by: NURSE PRACTITIONER

## 2025-02-25 RX ORDER — LORATADINE 10 MG/1
10 TABLET ORAL DAILY
Qty: 90 TABLET | Refills: 3 | Status: SHIPPED | OUTPATIENT
Start: 2025-02-25

## 2025-02-25 RX ORDER — AMLODIPINE BESYLATE 5 MG/1
5 TABLET ORAL DAILY
Qty: 90 TABLET | Refills: 1 | Status: SHIPPED | OUTPATIENT
Start: 2025-02-25

## 2025-02-25 RX ORDER — CETIRIZINE HYDROCHLORIDE 10 MG/1
10 TABLET ORAL DAILY
Qty: 90 TABLET | Refills: 3 | Status: SHIPPED | OUTPATIENT
Start: 2025-02-25

## 2025-02-25 ASSESSMENT — PAIN SCALES - GENERAL: PAINLEVEL_OUTOF10: SEVERE PAIN (7)
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
## 2025-02-25 NOTE — PATIENT INSTRUCTIONS
- Herbal medications and vitamins: DO NOT TAKE 14 days prior to surgery.   - Calcium Channel Blockers (amlodipine): May be continued on the day of surgery.   - ibuprofen (Advil, Motrin): DO NOT TAKE 1 day before surgery.    - continue with all allergy medications as prescribed   - no topical creams/lotions the day of surgery    Bring your cpap with you to surgery.     Constipation- you can start taking Miralax a capful daily 1-2 days before the surgery to help prevent constipation. You can also increase fiber in the diet and increase water intake.     For your allergies- Try taking Claritin (Loratadine) daily to help with allergy symptoms. If this doesn't work you can go back to the Zyrtec. Do not take both medications in the same day. Continue with flonase daily.

## 2025-02-26 ENCOUNTER — TELEPHONE (OUTPATIENT)
Dept: FAMILY MEDICINE | Facility: CLINIC | Age: 45
End: 2025-02-26

## 2025-02-26 ENCOUNTER — ALLIED HEALTH/NURSE VISIT (OUTPATIENT)
Dept: FAMILY MEDICINE | Facility: CLINIC | Age: 45
End: 2025-02-26
Payer: COMMERCIAL

## 2025-02-26 DIAGNOSIS — Z01.818 PRE-OPERATIVE EXAMINATION: ICD-10-CM

## 2025-02-26 DIAGNOSIS — I10 BENIGN ESSENTIAL HYPERTENSION: ICD-10-CM

## 2025-02-26 DIAGNOSIS — Z23 ENCOUNTER FOR IMMUNIZATION: Primary | ICD-10-CM

## 2025-02-26 LAB
ALBUMIN SERPL BCG-MCNC: 4.1 G/DL (ref 3.5–5.2)
ALP SERPL-CCNC: 106 U/L (ref 40–150)
ALT SERPL W P-5'-P-CCNC: 23 U/L (ref 0–50)
ANION GAP SERPL CALCULATED.3IONS-SCNC: 10 MMOL/L (ref 7–15)
AST SERPL W P-5'-P-CCNC: 22 U/L (ref 0–45)
BILIRUB SERPL-MCNC: 0.7 MG/DL
BUN SERPL-MCNC: 10.7 MG/DL (ref 6–20)
CALCIUM SERPL-MCNC: 8.9 MG/DL (ref 8.8–10.4)
CHLORIDE SERPL-SCNC: 102 MMOL/L (ref 98–107)
CREAT SERPL-MCNC: 0.59 MG/DL (ref 0.51–0.95)
EGFRCR SERPLBLD CKD-EPI 2021: >90 ML/MIN/1.73M2
EST. AVERAGE GLUCOSE BLD GHB EST-MCNC: 111 MG/DL
GLUCOSE SERPL-MCNC: 105 MG/DL (ref 70–99)
HBA1C MFR BLD: 5.5 % (ref 0–5.6)
HCO3 SERPL-SCNC: 22 MMOL/L (ref 22–29)
HGB BLD-MCNC: 7.2 G/DL (ref 11.7–15.7)
POTASSIUM SERPL-SCNC: 4.5 MMOL/L (ref 3.4–5.3)
PROT SERPL-MCNC: 7.8 G/DL (ref 6.4–8.3)
SODIUM SERPL-SCNC: 134 MMOL/L (ref 135–145)

## 2025-02-26 PROCEDURE — 90471 IMMUNIZATION ADMIN: CPT

## 2025-02-26 PROCEDURE — 80053 COMPREHEN METABOLIC PANEL: CPT

## 2025-02-26 PROCEDURE — 36415 COLL VENOUS BLD VENIPUNCTURE: CPT

## 2025-02-26 PROCEDURE — 85018 HEMOGLOBIN: CPT

## 2025-02-26 PROCEDURE — 99207 PR NO CHARGE NURSE ONLY: CPT

## 2025-02-26 PROCEDURE — 83036 HEMOGLOBIN GLYCOSYLATED A1C: CPT

## 2025-02-26 PROCEDURE — 90715 TDAP VACCINE 7 YRS/> IM: CPT

## 2025-02-26 NOTE — PROGRESS NOTES
Prior to immunization administration, verified patients identity using patient s name and date of birth. Please see Immunization Activity for additional information.     Is the patient's temperature normal (100.5 or less)? Yes     Patient MEETS CRITERIA. PROCEED with vaccine administration.      Patient instructed to remain in clinic for 15 minutes afterwards, and to report any adverse reactions.      Link to Ancillary Visit Immunization Standing Orders SmartSet     Screening performed by Barbara Rojo MA on 2/26/2025 at 10:31 AM.

## 2025-02-26 NOTE — TELEPHONE ENCOUNTER
If not blood in the vomit and no dark, tarry stools then we can proceed with outpatient work-up. In person is better, but given that we want to move forward quickly, I'll go ahead with the video visit. She will need labs so be aware that she'll need to go into a [place] clinic that day at some time.  Thank you so much!  ALEKSANDER Ocampo

## 2025-02-26 NOTE — RESULT ENCOUNTER NOTE
Please schedule patient for Friday for anemia work-up. Awaiting triage discussion with her, but I want to get this on the books for that day. Thanks!    Eliana,    Your hemoglobin was very low. You should be getting a call from our nurse team to go through some questions to make sure you don't need to go to the ER. Otherwise, I need to see you back to figure out why you have the anemia. I have an appt available this Friday which would be best. We do have to delay surgery.  If you have any questions, please feel free to contact the clinic.    ALEKSANDER Ocampo

## 2025-02-26 NOTE — TELEPHONE ENCOUNTER
Called pt and relayed would need labs, pt states she moved things around and can do an in person at 10:40, arrival time, on Friday. So changed her to an in person appt.    Thanks!  José LEBLANC RN   [facility]

## 2025-02-26 NOTE — TELEPHONE ENCOUNTER
Critical Lab Value Note  DATE/TIME OF CALL RECEIVED FROM LAB:  02/26/25 at 11:16 AM   LAB TEST:  HGB  LAB VALUE:  7.2  PROVIDER NOTIFIED?: Yes  PROVIDER NAME: CARISA Magdaleno CNP  DATE/TIME LAB VALUE REPORTED TO PROVIDER: 2/26/2025 at 11:14AM  MECHANISM OF PROVIDER NOTIFICATION:  EPIC MESSAGE   PROVIDER RESPONSE:  Inquire surgery scheduled date    Pt surgery scheduled for Wednesday, March 5th, 2025  Thank you,  Ethan PARK  [facility]

## 2025-02-26 NOTE — TELEPHONE ENCOUNTER
Please contact patient and triage symptoms for low hemoglobin to rule out need for ER.     If no red flags necessitating ER, schedule appt for Friday this week (ideally) OR next week. Surgery will need to be delayed.  Please call surgeon's office with update about patient status and recommendation to delay.    ALEKSANDER Ocampo

## 2025-02-26 NOTE — TELEPHONE ENCOUNTER
Called pt and she states lastnight she was throwing up multiple times and eventually was just throwing up stomach bile and hasn't eaten anything in 17 hours. She states she does feel better now and is going to try and eat something. Not having any blood in stool and have not noticed any dark or tarry stools and while vomiting did not have any blood, or coffee ground emesis. No SOB at rest or with exertion.     Pt states is not able to come to an appointment on Friday but would be able to do a VV and that would be her preference, so I did schedule for Friday VV if you approve.    Also called surgical facility and relayed surgery would need to be delayed and let them know will be meeting with provider to discuss further steps.    Thanks!  José LEBLANC RN   [facility]

## 2025-02-28 ENCOUNTER — HOSPITAL ENCOUNTER (EMERGENCY)
Facility: CLINIC | Age: 45
Discharge: HOME | End: 2025-02-28
Attending: FAMILY MEDICINE | Admitting: FAMILY MEDICINE
Payer: COMMERCIAL

## 2025-02-28 VITALS
SYSTOLIC BLOOD PRESSURE: 147 MMHG | RESPIRATION RATE: 16 BRPM | WEIGHT: 291 LBS | BODY MASS INDEX: 44.88 KG/M2 | OXYGEN SATURATION: 100 % | TEMPERATURE: 99.1 F | HEART RATE: 93 BPM | DIASTOLIC BLOOD PRESSURE: 80 MMHG

## 2025-02-28 DIAGNOSIS — D64.9 ANEMIA: ICD-10-CM

## 2025-02-28 LAB
ABO + RH BLD: NORMAL
ALBUMIN SERPL BCG-MCNC: 3.9 G/DL (ref 3.5–5.2)
ALP SERPL-CCNC: 101 U/L (ref 40–150)
ALT SERPL W P-5'-P-CCNC: 18 U/L (ref 0–50)
ANION GAP SERPL CALCULATED.3IONS-SCNC: 11 MMOL/L (ref 7–15)
AST SERPL W P-5'-P-CCNC: 16 U/L (ref 0–45)
BASOPHILS # BLD AUTO: 0 10E3/UL (ref 0–0.2)
BASOPHILS NFR BLD AUTO: 0 %
BILIRUB SERPL-MCNC: 0.4 MG/DL
BLD GP AB SCN SERPL QL: NEGATIVE
BLD PROD TYP BPU: NORMAL
BLD PROD TYP BPU: NORMAL
BLOOD COMPONENT TYPE: NORMAL
BLOOD COMPONENT TYPE: NORMAL
BUN SERPL-MCNC: 15.6 MG/DL (ref 6–20)
CALCIUM SERPL-MCNC: 8.7 MG/DL (ref 8.8–10.4)
CHLORIDE SERPL-SCNC: 102 MMOL/L (ref 98–107)
CODING SYSTEM: NORMAL
CODING SYSTEM: NORMAL
CREAT SERPL-MCNC: 0.63 MG/DL (ref 0.51–0.95)
CROSSMATCH: NORMAL
CROSSMATCH: NORMAL
EGFRCR SERPLBLD CKD-EPI 2021: >90 ML/MIN/1.73M2
EOSINOPHIL # BLD AUTO: 0.1 10E3/UL (ref 0–0.7)
EOSINOPHIL NFR BLD AUTO: 2 %
ERYTHROCYTE [DISTWIDTH] IN BLOOD BY AUTOMATED COUNT: 19.9 % (ref 10–15)
GLUCOSE SERPL-MCNC: 90 MG/DL (ref 70–99)
HCG SERPL QL: NEGATIVE
HCO3 SERPL-SCNC: 22 MMOL/L (ref 22–29)
HCT VFR BLD AUTO: 24.6 % (ref 35–47)
HGB BLD-MCNC: 6.7 G/DL (ref 11.7–15.7)
IMM GRANULOCYTES # BLD: 0.1 10E3/UL
IMM GRANULOCYTES NFR BLD: 1 %
INR PPP: 0.95 (ref 0.85–1.15)
ISSUE DATE AND TIME: NORMAL
ISSUE DATE AND TIME: NORMAL
LYMPHOCYTES # BLD AUTO: 2.6 10E3/UL (ref 0.8–5.3)
LYMPHOCYTES NFR BLD AUTO: 31 %
MCH RBC QN AUTO: 18.6 PG (ref 26.5–33)
MCHC RBC AUTO-ENTMCNC: 27.2 G/DL (ref 31.5–36.5)
MCV RBC AUTO: 68 FL (ref 78–100)
MONOCYTES # BLD AUTO: 0.7 10E3/UL (ref 0–1.3)
MONOCYTES NFR BLD AUTO: 8 %
NEUTROPHILS # BLD AUTO: 5 10E3/UL (ref 1.6–8.3)
NEUTROPHILS NFR BLD AUTO: 59 %
NRBC # BLD AUTO: 0 10E3/UL
NRBC BLD AUTO-RTO: 0 /100
PLATELET # BLD AUTO: 532 10E3/UL (ref 150–450)
POTASSIUM SERPL-SCNC: 3.8 MMOL/L (ref 3.4–5.3)
PROT SERPL-MCNC: 7.8 G/DL (ref 6.4–8.3)
RBC # BLD AUTO: 3.6 10E6/UL (ref 3.8–5.2)
SODIUM SERPL-SCNC: 135 MMOL/L (ref 135–145)
SPECIMEN EXP DATE BLD: NORMAL
UNIT ABO/RH: NORMAL
UNIT ABO/RH: NORMAL
UNIT NUMBER: NORMAL
UNIT NUMBER: NORMAL
UNIT STATUS: NORMAL
UNIT STATUS: NORMAL
UNIT TYPE ISBT: 5100
UNIT TYPE ISBT: 5100
WBC # BLD AUTO: 8.5 10E3/UL (ref 4–11)

## 2025-02-28 PROCEDURE — 86923 COMPATIBILITY TEST ELECTRIC: CPT | Performed by: PHYSICIAN ASSISTANT

## 2025-02-28 PROCEDURE — 36430 TRANSFUSION BLD/BLD COMPNT: CPT | Performed by: FAMILY MEDICINE

## 2025-02-28 PROCEDURE — P9016 RBC LEUKOCYTES REDUCED: HCPCS | Performed by: PHYSICIAN ASSISTANT

## 2025-02-28 PROCEDURE — 85004 AUTOMATED DIFF WBC COUNT: CPT | Performed by: PHYSICIAN ASSISTANT

## 2025-02-28 PROCEDURE — 99291 CRITICAL CARE FIRST HOUR: CPT | Mod: 25 | Performed by: FAMILY MEDICINE

## 2025-02-28 PROCEDURE — 82310 ASSAY OF CALCIUM: CPT | Performed by: PHYSICIAN ASSISTANT

## 2025-02-28 PROCEDURE — 86900 BLOOD TYPING SEROLOGIC ABO: CPT | Performed by: PHYSICIAN ASSISTANT

## 2025-02-28 PROCEDURE — 82374 ASSAY BLOOD CARBON DIOXIDE: CPT | Performed by: PHYSICIAN ASSISTANT

## 2025-02-28 PROCEDURE — 85610 PROTHROMBIN TIME: CPT | Performed by: PHYSICIAN ASSISTANT

## 2025-02-28 PROCEDURE — 84703 CHORIONIC GONADOTROPIN ASSAY: CPT | Performed by: PHYSICIAN ASSISTANT

## 2025-02-28 PROCEDURE — 99291 CRITICAL CARE FIRST HOUR: CPT | Performed by: FAMILY MEDICINE

## 2025-02-28 PROCEDURE — 36415 COLL VENOUS BLD VENIPUNCTURE: CPT | Performed by: PHYSICIAN ASSISTANT

## 2025-02-28 RX ORDER — FERROUS SULFATE 325(65) MG
325 TABLET, DELAYED RELEASE (ENTERIC COATED) ORAL EVERY OTHER DAY
Qty: 30 TABLET | Refills: 0 | Status: SHIPPED | OUTPATIENT
Start: 2025-02-28

## 2025-02-28 ASSESSMENT — ACTIVITIES OF DAILY LIVING (ADL)
ADLS_ACUITY_SCORE: 41

## 2025-02-28 ASSESSMENT — COLUMBIA-SUICIDE SEVERITY RATING SCALE - C-SSRS
1. IN THE PAST MONTH, HAVE YOU WISHED YOU WERE DEAD OR WISHED YOU COULD GO TO SLEEP AND NOT WAKE UP?: NO
6. HAVE YOU EVER DONE ANYTHING, STARTED TO DO ANYTHING, OR PREPARED TO DO ANYTHING TO END YOUR LIFE?: NO
2. HAVE YOU ACTUALLY HAD ANY THOUGHTS OF KILLING YOURSELF IN THE PAST MONTH?: NO
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
## 2025-02-28 NOTE — ED TRIAGE NOTES
Pt reports she had pre surgery (L wrist and [facility] in [place]) labs and had an incidental finding of low hbg 2 days ago of 7.2 and repeat of 6.8 . pt reports she is tired and hungry. Denies knowledge of bleeding from anywhere.     Pt states she was drinking 4-5 beers daily for 1-2 months stopped maybe a week ago     Triage Assessment (Adult)       Row Name 02/28/25 1223          Triage Assessment    Airway WDL WDL        Respiratory WDL    Respiratory WDL WDL        Skin Circulation/Temperature WDL    Skin Circulation/Temperature WDL X        Cardiac WDL    Cardiac WDL WDL        Peripheral/Neurovascular WDL    Peripheral Neurovascular WDL WDL        Cognitive/Neuro/Behavioral WDL    Cognitive/Neuro/Behavioral WDL WDL

## 2025-03-03 ENCOUNTER — TELEPHONE (OUTPATIENT)
Dept: FAMILY MEDICINE | Facility: CLINIC | Age: 45
End: 2025-03-03
Payer: COMMERCIAL

## 2025-03-03 NOTE — TELEPHONE ENCOUNTER
Please call the surgeon's office. Patient had an infusion of packed red blood cells last Friday. There was no evidence for acute bleeding process. I am ok for patient to move forward with surgery. Do they need a new hemoglobin to move forward or the pre-op documentation addended?     Then, presuming they are ok moving forward with surgery on their end, please notify patient.     Thanks!  ALEKSANDER Ocampo

## 2025-03-04 ENCOUNTER — MYC MEDICAL ADVICE (OUTPATIENT)
Dept: FAMILY MEDICINE | Facility: CLINIC | Age: 45
End: 2025-03-04

## 2025-03-04 ENCOUNTER — LAB (OUTPATIENT)
Dept: LAB | Facility: CLINIC | Age: 45
End: 2025-03-04
Payer: COMMERCIAL

## 2025-03-04 ENCOUNTER — TELEPHONE (OUTPATIENT)
Dept: FAMILY MEDICINE | Facility: CLINIC | Age: 45
End: 2025-03-04
Payer: COMMERCIAL

## 2025-03-04 DIAGNOSIS — Z01.818 PRE-OP EXAM: ICD-10-CM

## 2025-03-04 DIAGNOSIS — Z01.818 PRE-OP EXAM: Primary | ICD-10-CM

## 2025-03-04 PROBLEM — D50.8 IRON DEFICIENCY ANEMIA SECONDARY TO INADEQUATE DIETARY IRON INTAKE: Status: ACTIVE | Noted: 2025-03-04

## 2025-03-04 LAB
BASOPHILS # BLD AUTO: 0.1 10E3/UL (ref 0–0.2)
BASOPHILS NFR BLD AUTO: 1 %
EOSINOPHIL # BLD AUTO: 0.1 10E3/UL (ref 0–0.7)
EOSINOPHIL NFR BLD AUTO: 1 %
ERYTHROCYTE [DISTWIDTH] IN BLOOD BY AUTOMATED COUNT: 23.1 % (ref 10–15)
HCT VFR BLD AUTO: 30.3 % (ref 35–47)
HGB BLD-MCNC: 8.5 G/DL (ref 11.7–15.7)
IMM GRANULOCYTES # BLD: 0 10E3/UL
IMM GRANULOCYTES NFR BLD: 0 %
LYMPHOCYTES # BLD AUTO: 2.2 10E3/UL (ref 0.8–5.3)
LYMPHOCYTES NFR BLD AUTO: 28 %
MCH RBC QN AUTO: 20.3 PG (ref 26.5–33)
MCHC RBC AUTO-ENTMCNC: 28.1 G/DL (ref 31.5–36.5)
MCV RBC AUTO: 73 FL (ref 78–100)
MONOCYTES # BLD AUTO: 0.6 10E3/UL (ref 0–1.3)
MONOCYTES NFR BLD AUTO: 8 %
NEUTROPHILS # BLD AUTO: 4.9 10E3/UL (ref 1.6–8.3)
NEUTROPHILS NFR BLD AUTO: 62 %
NRBC # BLD AUTO: 0 10E3/UL
NRBC BLD AUTO-RTO: 0 /100
PLATELET # BLD AUTO: 583 10E3/UL (ref 150–450)
RBC # BLD AUTO: 4.18 10E6/UL (ref 3.8–5.2)
WBC # BLD AUTO: 7.8 10E3/UL (ref 4–11)

## 2025-03-04 PROCEDURE — 85025 COMPLETE CBC W/AUTO DIFF WBC: CPT

## 2025-03-04 PROCEDURE — 36415 COLL VENOUS BLD VENIPUNCTURE: CPT

## 2025-03-04 NOTE — TELEPHONE ENCOUNTER
[facility] calling for a copy of the pre-op. Fax number is [phone].  Pt also is needing to have a updated Hemoglobin today.     TC's please fax pre-op.     Hemoglobin order has been pended. Thanks    [facility] phone number- [phone].     Suzi Tanner RN  [facility]

## 2025-03-04 NOTE — TELEPHONE ENCOUNTER
[facility] called- they are going to cancel the procedure, according to the surgeon they did not feel comfortable doing the procedure more so the anesthesiologist does not feel comfortable. Wants to know if a follow up plan is in action, to reschedule surgery.

## 2025-03-04 NOTE — TELEPHONE ENCOUNTER
Spoke with [facility] the anesthesiologist will call Makenna on the phone number given to explain in more detail why surgery was canceled

## 2025-03-04 NOTE — TELEPHONE ENCOUNTER
Spoke to pt and informed call will be made to [facility] to confirm need for updated hemoglobin prior to pre-op visit notes and okay for surgery sent over.     [facility] sent and spoke to pt informing [facility] would like updated labs - lab pended for approval by PCP.  Info to schedule lab only provided to pt    Pt will go to lab now      Reyna Isidro RN on 3/4/2025 at 10:06 AM

## 2025-03-04 NOTE — TELEPHONE ENCOUNTER
Informed pt that pcp okd her for surgery and faxed pre op info. There was other info in last PCP message. Transferred to triage

## 2025-03-04 NOTE — TELEPHONE ENCOUNTER
Can you call [facility]? The patient follow-up hemoglobin is still pending, but this is likely simply long-standing iron deficiency anemia and addressed via PRBC infusion. I am happy to speak with the surgeon if necessary.  ALEKSANDER Ocampo

## 2025-03-04 NOTE — TELEPHONE ENCOUNTER
Pt called - just completed lab draw    Once resulted please send lab results and note from PCP to surgeon with reason for low Hgb.     Thanks   VIVIAN Orellana

## 2025-03-04 NOTE — TELEPHONE ENCOUNTER
"Patient called to provide fax [facility]    Please fax Pre-op visit notes to: [phone]    Triage RN called [facility] and left a vm with surgeon, Dr. Jessica Cortez and team     Does pt need updated hemoglobin after infusion of packed red blood cells on Friday 2/28?     PCP okayed pt for surgery - \"no evidence of acute bleeding process\"    Please confirm when call back     VIVIAN Orellana    "

## 2025-03-04 NOTE — TELEPHONE ENCOUNTER
Pt called inquiring abt pre op notes. Informed her that M[facility] said she was ok for therapy in her last note and transferred to triage to discuss other info in PCP's last note.     [facility] [phone] and emailed to [email or web address] per pt

## 2025-03-04 NOTE — TELEPHONE ENCOUNTER
Pt calling for inform PCP that anesthesiologist is canceling surgery and she is very frustrated.     Informed results not in yet   Called lab - due to morphology they are sending her specimen out.   Flag - anisocytosis  RBC - 4.2  Platelet - 557+  Able to provide verbal Hgb - 8.6 g/dL    They offered paper copy for PCP to review until final results are back.     Reyna MCKEON RN

## 2025-03-05 ENCOUNTER — PATIENT OUTREACH (OUTPATIENT)
Dept: ONCOLOGY | Facility: CLINIC | Age: 45
End: 2025-03-05
Payer: COMMERCIAL

## 2025-03-05 NOTE — PROGRESS NOTES
New Patient Oncology Nurse Navigator Note     Referral Received: 03/05/25      Referring provider:     Martha Davis APRN CNP     Referring Clinic/Organization: [facility]     Referred to: Benign Hematology    Requested provider (if applicable): First available - did not specify      Evaluation for :   Diagnosis   D50.8 (ICD-10-CM) - Iron deficiency anemia secondary to inadequate dietary iron intake      Clinical History (per Nurse review of records provided):       Latest Reference Range & Units 02/26/25 10:47 02/28/25 10:42 02/28/25 12:57 03/04/25 11:07   Ferritin 6 - 175 ng/mL 7      Iron 37 - 145 ug/dL 15 (L)      Iron Binding Capacity 240 - 430 ug/dL 450 (H)      Iron Sat Index 15 - 46 % 3 (L)      WBC 4.0 - 11.0 10e3/uL  8.1 8.5 7.8   Hemoglobin 11.7 - 15.7 g/dL 7.2 (LL) 6.8 (LL) 6.7 (LL) 8.5 (L)   Hematocrit 35.0 - 47.0 %  25.5 (L) 24.6 (L) 30.3 (L)   Platelet Count 150 - 450 10e3/uL  585 (H) 532 (H) 583 (H)   RBC Count 3.80 - 5.20 10e6/uL  3.69 (L) 3.60 (L) 4.18   MCV 78 - 100 fL  69 (L) 68 (L) 73 (L)   MCH 26.5 - 33.0 pg  18.4 (L) 18.6 (L) 20.3 (L)   MCHC 31.5 - 36.5 g/dL  26.7 (L) 27.2 (L) 28.1 (L)   RDW 10.0 - 15.0 %  20.3 (H) 19.9 (H) 23.1 (H)   % Neutrophils %  59 59 62   % Lymphocytes %  29 31 28   % Monocytes %  10 8 8   % Eosinophils %  2 2 1   % Basophils %  1 0 1   Absolute Basophils 0.0 - 0.2 10e3/uL  0.0 0.0 0.1   Absolute Eosinophils 0.0 - 0.7 10e3/uL  0.1 0.1 0.1   Absolute Immature Granulocytes <=0.4 10e3/uL  0.0 0.1 0.0   Absolute Lymphocytes 0.8 - 5.3 10e3/uL  2.4 2.6 2.2   Absolute Monocytes 0.0 - 1.3 10e3/uL  0.8 0.7 0.6   % Immature Granulocytes %  0 1 0   Absolute Neutrophils 1.6 - 8.3 10e3/uL  4.8 5.0 4.9   Absolute NRBCs 10e3/uL  0.0 0.0 0.0   NRBCs per 100 WBC <1 /100  0 0 0   % Retic 0.5 - 2.0 %  1.5     Absolute Retic 0.025 - 0.095 10e6/uL  0.055     (LL): Data is critically low  (L): Data is abnormally low  (H): Data is abnormally high    Records Location: Epic      Additional testing needed prior to consult:     N/A    Referral updates and Plan:     03/05/2025 2:52 PM -  Referral received and reviewed. Called pt at this time, introduced my role as nurse navigator with [facility] Hematology/Oncology dept and that we have recd the referral for dx of WERNER from Martha Davis.  Pt confirms they are aware of the referral and ready to schedule  Provided contact information if future questions arise.     -Transferred pt to NPS line [phone] to schedule appt per scheduling instructions.    Little Cruz, CHARBELN, RN  Hematology/Oncology Nurse Navigator  [facility]  [phone] / [phone]

## 2025-03-05 NOTE — TELEPHONE ENCOUNTER
RECORDS STATUS - ALL OTHER DIAGNOSIS      RECORDS RECEIVED FROM: [facility] - Internal records   DATE RECEIVED: 3/5

## 2025-03-06 ENCOUNTER — PATIENT OUTREACH (OUTPATIENT)
Dept: ONCOLOGY | Facility: CLINIC | Age: 45
End: 2025-03-06

## 2025-03-06 ENCOUNTER — PRE VISIT (OUTPATIENT)
Dept: ONCOLOGY | Facility: CLINIC | Age: 45
End: 2025-03-06
Payer: COMMERCIAL

## 2025-03-06 ENCOUNTER — ONCOLOGY VISIT (OUTPATIENT)
Dept: ONCOLOGY | Facility: CLINIC | Age: 45
End: 2025-03-06
Attending: NURSE PRACTITIONER
Payer: COMMERCIAL

## 2025-03-06 VITALS
DIASTOLIC BLOOD PRESSURE: 81 MMHG | WEIGHT: 291 LBS | RESPIRATION RATE: 18 BRPM | BODY MASS INDEX: 44.88 KG/M2 | OXYGEN SATURATION: 100 % | SYSTOLIC BLOOD PRESSURE: 139 MMHG | TEMPERATURE: 98.5 F | HEART RATE: 77 BPM

## 2025-03-06 DIAGNOSIS — D50.8 IRON DEFICIENCY ANEMIA SECONDARY TO INADEQUATE DIETARY IRON INTAKE: Primary | ICD-10-CM

## 2025-03-06 PROCEDURE — 99213 OFFICE O/P EST LOW 20 MIN: CPT | Performed by: INTERNAL MEDICINE

## 2025-03-06 RX ORDER — DIPHENHYDRAMINE HYDROCHLORIDE 50 MG/ML
25 INJECTION, SOLUTION INTRAMUSCULAR; INTRAVENOUS
Start: 2025-03-13

## 2025-03-06 RX ORDER — HEPARIN SODIUM,PORCINE 10 UNIT/ML
5-20 VIAL (ML) INTRAVENOUS DAILY PRN
OUTPATIENT
Start: 2025-03-13

## 2025-03-06 RX ORDER — MEPERIDINE HYDROCHLORIDE 25 MG/ML
25 INJECTION INTRAMUSCULAR; INTRAVENOUS; SUBCUTANEOUS
OUTPATIENT
Start: 2025-03-13

## 2025-03-06 RX ORDER — HEPARIN SODIUM (PORCINE) LOCK FLUSH IV SOLN 100 UNIT/ML 100 UNIT/ML
5 SOLUTION INTRAVENOUS
OUTPATIENT
Start: 2025-03-13

## 2025-03-06 RX ORDER — DIPHENHYDRAMINE HYDROCHLORIDE 50 MG/ML
50 INJECTION, SOLUTION INTRAMUSCULAR; INTRAVENOUS
Start: 2025-03-13

## 2025-03-06 RX ORDER — ALBUTEROL SULFATE 90 UG/1
1-2 INHALANT RESPIRATORY (INHALATION)
Start: 2025-03-13

## 2025-03-06 RX ORDER — EPINEPHRINE 1 MG/ML
0.3 INJECTION, SOLUTION INTRAMUSCULAR; SUBCUTANEOUS EVERY 5 MIN PRN
OUTPATIENT
Start: 2025-03-13

## 2025-03-06 RX ORDER — ALBUTEROL SULFATE 0.83 MG/ML
2.5 SOLUTION RESPIRATORY (INHALATION)
OUTPATIENT
Start: 2025-03-13

## 2025-03-06 RX ORDER — METHYLPREDNISOLONE SODIUM SUCCINATE 40 MG/ML
40 INJECTION INTRAMUSCULAR; INTRAVENOUS
Start: 2025-03-13

## 2025-03-06 ASSESSMENT — PAIN SCALES - GENERAL: PAINLEVEL_OUTOF10: NO PAIN (0)

## 2025-03-06 NOTE — PROGRESS NOTES
[facility]: Cancer Care Follow-Up Note                                    Discussion with Patient:                                                      Writer placed call to patient to introduce self and role as RN Care Coordinator for Dr. Zavaleta . Writer reviewed methods of contact for care coordination needs and when to contact triage for new/concerning symptoms/illness. Information sent via [employer] for Infed and went over what to expect on day of infusion.  Letter for work in chart for pt.      Kinjal Friend RN  RN Care Coordinator  [facility] [address] [address]  Office: [phone] Option 5, Option 2  Fax: [phone]         Goals          General    Communication     Notes - Note created  3/6/2025 11:39 AM by Kinjal Friend, RN    Goal Statement: I will use my clinic and care team resources as directed.  Date Goal set: 3/6/2025  Barriers: disease burden  Strengths: coping, motivation, and health awareness  Date to Achieve By: ongoing  Patient expressed understanding of goal: Yes  Action steps to achieve this goal:  I will contact triage with new, worsening or uncontrolled symptoms.   I will not send urgent or symptomatic messages through [employer].   I will contact scheduling to arrange or make changes in my appointments.                 Dates of Treatment:                                                      Infusion given in last 28 days       None            Assessment:                                                        Plan of Care Education Review:   Assessment completed with:: Patient    Plan of Care Education   Yearly learning assessment completed?: Yes (see Education tab)  Diagnosis:: Anemia  Does patient understand diagnosis?: Yes  Tx plan/regimen:: IV iron  Does patient understand treatment plan/regimen?: Yes  Preparing for treatment:: Reviewed treatment preparation information with patient (vascular access, day of chemo, visitor  policy, what to bring, etc.)  Plan of Care:: MD follow-up appointment, Lab appointment, Treatment schedule  When to call provider:: Bleeding, Increased shortness of breath    Evaluation of Learning  Patient Education Provided: Yes  Readiness:: Acceptance  Method:: Explanation, Literature  Response:: Verbalizes understanding           Intervention/Education provided during outreach:                                                         Patient to follow up as scheduled at next appt  Patient to call/[employer] message with updates  Medication Change: Discussed medication change with patient  Confirmed patient has clinic and triage numbers    Signature:  Kinjal Friend RN

## 2025-03-06 NOTE — NURSING NOTE
"Oncology Rooming Note    March 6, 2025 8:04 AM   Eliana Centeno Bishnu Anders is a 45 year old female who presents for:    Chief Complaint   Patient presents with    Oncology Clinic Visit     Iron deficiency anemia secondary to inadequate dietary iron intake         Initial Vitals: /81 (BP Location: Right arm, Patient Position: Sitting, Cuff Size: Adult Regular)   Pulse 77   Temp 98.5  F (36.9  C) (Oral)   Resp 18   Wt 132 kg (291 lb)   LMP  (LMP Unknown)   SpO2 100%   BMI 44.88 kg/m   Estimated body mass index is 44.88 kg/m  as calculated from the following:    Height as of 2/25/25: 1.715 m (5' 7.52\").    Weight as of this encounter: 132 kg (291 lb). Body surface area is 2.51 meters squared.  No Pain (0) Comment: Data Unavailable   No LMP recorded (lmp unknown). (Menstrual status: IUD).  Allergies reviewed: Yes  Medications reviewed: Yes    Medications: Medication refills not needed today.  Pharmacy name entered into [employer]: [id number] - [place], [place] - [address]    Frailty Screening:   Is the patient here for a new oncology consult visit in cancer care? 2. No    PHQ9:  Did this patient require a PHQ9?: No      Clinical concerns: none.      Jorge Pacheco"

## 2025-03-06 NOTE — LETTER
3/6/2025      Eliana Anders  [address]  [facility] [address]      Dear Colleague,    Thank you for referring your patient, Eliana Anders, to the [facility]. Please see a copy of my visit note below.        [facility] Hematology Consultation  [address] [facility], [place] [address]  Phone: [phone]    Outpatient Visit Note:    Patient: Eliana Anders  MRN: [id number]  : 1980  EVELIA: Mar 6, 2025     Reason for Consultation:  Eliana Anders is a referred by Martah Davis NP for evaluation and treatment of iron deficiency anemia.    History: Eliana Anders is a 45 year old woman with a history of HTN, VERÓNICA, and left cubital tunnel syndrome who presents for work-up and management of recently diagnosed anemia. She was being evaluated for a preoperative visit for L wrist pisiformectemy and left ulner nerve release at elbow when she was found to have a hgb of 7.2 (prior to that she last had a hgb of 14.1 in 2019). She was recommended to go to the emergency room () and CBC showed hgb 6.8, MCV 69, platelets 585k, and retic index was 0.6, showing an inadequate response. She ended up receiving 2u pRBC in the ED.     Today she reports feeling better with more energy after her blood transfusions, though not back to her baseline. She works in [employer] and used to walk 20,000 steps a day. Over the past year, she noticed decreased physical endurance and that she would need to take more breaks at work. She had attributed this to knee issues. She has also had a craving for ice chips over the past 2 years. She denies any large signs of bleeding, including recurrent epistaxis, large bruising, bloody or dark stools. She has an IUD and her periods are irregular and light, typically just spotting for about 1 day. Her diet had consisted of largely frozen meals or fast foods, she  reports limited red meats or green leafy vegetables.      Social: Works in [employer], lives at home with daughter. Close with her sister and nephew.  Fam hx: Sister with aneurysm, denies family history of anemia, bleeding, or clotting disorders.  Habits: Former smoker - 5 year pack history. Drinks alcohol about 3-4x/week with approximately 3-4 beers each time. Denies recreational drugs    Physical Exam:  /81 (BP Location: Right arm, Patient Position: Sitting, Cuff Size: Adult Regular)   Pulse 77   Temp 98.5  F (36.9  C) (Oral)   Resp 18   Wt 132 kg (291 lb)   LMP  (LMP Unknown)   SpO2 100%   BMI 44.88 kg/m    Gen: Appears well, no distress  HEENT: no scleral icterus, MMM  CV: RRR  Pulm: CTAB, no wheezing or crackles  Abd: soft, nontender, nondistended, no rebound or guarding   Ext: no LE edema  Skin: no ecchymoses or rash on exposed skin  Neuro: no focal deficits, ambulates independently     Labs:  CBC RESULTS: (s/p 2u pRBC)  Recent Labs   Lab Test 03/04/25  1107   WBC 7.8   RBC 4.18   HGB 8.5*   HCT 30.3*   MCV 73*   MCH 20.3*   MCHC 28.1*   RDW 23.1*   *     Ferritin   Date Value Ref Range Status   02/26/2025 7 6 - 175 ng/mL Final     Iron   Date Value Ref Range Status   02/26/2025 15 (L) 37 - 145 ug/dL Final     Iron Binding Capacity   Date Value Ref Range Status   02/26/2025 450 (H) 240 - 430 ug/dL Final     Ferritin (2/26): 7 ng/ml     Assessment: Eliana Taryn Anders is a 45 year old woman with a history of HTN, VERÓNICA, and left cubital tunnel syndrome who was recently found to have microcytic anemia. Her presentation is consistent with iron deficiency anemia. She had a hgb down to 6.8, MCV 69, hypoproliferative reticulocyte response, with low ferritin and elevated TIBC, 1 year of fatigue/decreased exercise endurance, and 2 years of PICA with ice cravings. She also had some symptomatic improvement after getting empiric 2u pRBC transfusions in the ED. She had had normal Hgb and  MCV between 6861-6324, so this does not appear to be an underlying thalassemia. Her overall presentation was less suggestive of hemolysis, but we will check a haptoglobin with her next blood draw.     The underlying etiology for her iron deficiency is unclear, we suspect most likely from a GI source, though she does not endorse any obvious black or bloody stools. She is due for a screening colonoscopy and she would likely benefit from an EGD as well to look for upper GI sources of bleeding, such as gastritis. We recommended iron repletion with IV iron dextran, pending insurance approval. Since she is pending left wrist surgery, we will plan to recheck CBC 2 weeks after IV iron. She can hold off on PO iron supplement for now. We can reassess need for additional iron supplement at her 3 month follow-up visit.    Plan:  GI referral to consider colonoscopy and upper EGD   IV dextran 1000 mg in about 1 week  Follow-up CBC, haptoglobin, retics 2 weeks after IV iron  Follow-up in 3 months with CBC, iron studies, retic     Patient was seen in conjunction with Dr. Meghann Slater MD   Hematology/Oncology Fellow PGY6    Attending Note:  I have reviewed the patient chart, and interviewed and examined the patient.  I agree with the assessment and plan.      Time: I spent a total of 45 minutes on the day of the visit. Please see the note for further information on patient assessment and treatment.   Madonna Zavaleta MD  Hematology          Again, thank you for allowing me to participate in the care of your patient.        Sincerely,        Madonna Zavaleta MD    Electronically signed

## 2025-03-06 NOTE — PROGRESS NOTES
[facility] Hematology Consultation  [address] [place] [address]  Phone: [phone]    Outpatient Visit Note:    Patient: Elaina Anders  MRN: [id number]  : 1980  EVELIA: Mar 6, 2025     Reason for Consultation:  Eliana Anders is a referred by Martha Davis NP for evaluation and treatment of iron deficiency anemia.    History: Eliana Anders is a 45 year old woman with a history of HTN, VERÓNICA, and left cubital tunnel syndrome who presents for work-up and management of recently diagnosed anemia. She was being evaluated for a preoperative visit for L wrist pisiformectemy and left ulner nerve release at elbow when she was found to have a hgb of 7.2 (prior to that she last had a hgb of 14.1 in 2019). She was recommended to go to the emergency room () and CBC showed hgb 6.8, MCV 69, platelets 585k, and retic index was 0.6, showing an inadequate response. She ended up receiving 2u pRBC in the ED.     Today she reports feeling better with more energy after her blood transfusions, though not back to her baseline. She works in [employer] and used to walk 20,000 steps a day. Over the past year, she noticed decreased physical endurance and that she would need to take more breaks at work. She had attributed this to knee issues. She has also had a craving for ice chips over the past 2 years. She denies any large signs of bleeding, including recurrent epistaxis, large bruising, bloody or dark stools. She has an IUD and her periods are irregular and light, typically just spotting for about 1 day. Her diet had consisted of largely frozen meals or fast foods, she reports limited red meats or green leafy vegetables.      Social: Works in [employer], lives at home with daughter. Close with her sister and nephew.  Fam hx: Sister with aneurysm, denies family history of anemia, bleeding, or clotting disorders.  Habits: Former smoker - 5 year pack  history. Drinks alcohol about 3-4x/week with approximately 3-4 beers each time. Denies recreational drugs    Physical Exam:  /81 (BP Location: Right arm, Patient Position: Sitting, Cuff Size: Adult Regular)   Pulse 77   Temp 98.5  F (36.9  C) (Oral)   Resp 18   Wt 132 kg (291 lb)   LMP  (LMP Unknown)   SpO2 100%   BMI 44.88 kg/m    Gen: Appears well, no distress  HEENT: no scleral icterus, MMM  CV: RRR  Pulm: CTAB, no wheezing or crackles  Abd: soft, nontender, nondistended, no rebound or guarding   Ext: no LE edema  Skin: no ecchymoses or rash on exposed skin  Neuro: no focal deficits, ambulates independently     Labs:  CBC RESULTS: (s/p 2u pRBC)  Recent Labs   Lab Test 03/04/25  1107   WBC 7.8   RBC 4.18   HGB 8.5*   HCT 30.3*   MCV 73*   MCH 20.3*   MCHC 28.1*   RDW 23.1*   *     Ferritin   Date Value Ref Range Status   02/26/2025 7 6 - 175 ng/mL Final     Iron   Date Value Ref Range Status   02/26/2025 15 (L) 37 - 145 ug/dL Final     Iron Binding Capacity   Date Value Ref Range Status   02/26/2025 450 (H) 240 - 430 ug/dL Final     Ferritin (2/26): 7 ng/ml     Assessment: Eliana Taryn Anders is a 45 year old woman with a history of HTN, VERÓNICA, and left cubital tunnel syndrome who was recently found to have microcytic anemia. Her presentation is consistent with iron deficiency anemia. She had a hgb down to 6.8, MCV 69, hypoproliferative reticulocyte response, with low ferritin and elevated TIBC, 1 year of fatigue/decreased exercise endurance, and 2 years of PICA with ice cravings. She also had some symptomatic improvement after getting empiric 2u pRBC transfusions in the ED. She had had normal Hgb and MCV between 6045-8861, so this does not appear to be an underlying thalassemia. Her overall presentation was less suggestive of hemolysis, but we will check a haptoglobin with her next blood draw.     The underlying etiology for her iron deficiency is unclear, we suspect most likely from  a GI source, though she does not endorse any obvious black or bloody stools. She is due for a screening colonoscopy and she would likely benefit from an EGD as well to look for upper GI sources of bleeding, such as gastritis. We recommended iron repletion with IV iron dextran, pending insurance approval. Since she is pending left wrist surgery, we will plan to recheck CBC 2 weeks after IV iron. She can hold off on PO iron supplement for now. We can reassess need for additional iron supplement at her 3 month follow-up visit.    Plan:  GI referral to consider colonoscopy and upper EGD   IV dextran 1000 mg in about 1 week  Follow-up CBC, haptoglobin, retics 2 weeks after IV iron  Follow-up in 3 months with CBC, iron studies, retic     Patient was seen in conjunction with Dr. Meghann Slater MD   Hematology/Oncology Fellow PGY6    Attending Note:  I have reviewed the patient chart, and interviewed and examined the patient.  I agree with the assessment and plan.      Time: I spent a total of 45 minutes on the day of the visit. Please see the note for further information on patient assessment and treatment.   Madonna Zavaleta MD  Hematology

## 2025-03-06 NOTE — LETTER
[facility] CANCER CLINIC  [address] [facility] [address] [phone]      3/6/2025    Re: Eliana Anders      TO WHOM IT MAY CONCERN:    Eliana Anders is a current patient of ours and was seen in clinic on 3/6/25. Please excuse her from work for this date.  She can return to work without restrictions on 3/7/25.  She will also need to be excused from work for an upcoming treatment on 3/26/25.  She will be able to return to work on 3/27/25 without restrictions    Sincerely,    Kinjal Friend, RN for Dr Madonna Zavaleta MD

## 2025-03-20 ENCOUNTER — APPOINTMENT (OUTPATIENT)
Dept: LAB | Facility: CLINIC | Age: 45
End: 2025-03-20

## 2025-03-20 ENCOUNTER — LAB (OUTPATIENT)
Dept: LAB | Facility: CLINIC | Age: 45
End: 2025-03-20
Payer: COMMERCIAL

## 2025-03-20 DIAGNOSIS — D50.8 IRON DEFICIENCY ANEMIA SECONDARY TO INADEQUATE DIETARY IRON INTAKE: ICD-10-CM

## 2025-03-20 LAB
BASOPHILS # BLD AUTO: 0 10E3/UL (ref 0–0.2)
BASOPHILS NFR BLD AUTO: 0 %
EOSINOPHIL # BLD AUTO: 0.2 10E3/UL (ref 0–0.7)
EOSINOPHIL NFR BLD AUTO: 2 %
ERYTHROCYTE [DISTWIDTH] IN BLOOD BY AUTOMATED COUNT: 24.8 % (ref 10–15)
HAPTOGLOB SERPL-MCNC: 136 MG/DL (ref 30–200)
HCT VFR BLD AUTO: 31.7 % (ref 35–47)
HGB BLD-MCNC: 9.1 G/DL (ref 11.7–15.7)
IMM GRANULOCYTES # BLD: 0 10E3/UL
IMM GRANULOCYTES NFR BLD: 0 %
LYMPHOCYTES # BLD AUTO: 3.1 10E3/UL (ref 0.8–5.3)
LYMPHOCYTES NFR BLD AUTO: 30 %
MCH RBC QN AUTO: 21.1 PG (ref 26.5–33)
MCHC RBC AUTO-ENTMCNC: 28.7 G/DL (ref 31.5–36.5)
MCV RBC AUTO: 73 FL (ref 78–100)
MONOCYTES # BLD AUTO: 0.9 10E3/UL (ref 0–1.3)
MONOCYTES NFR BLD AUTO: 9 %
NEUTROPHILS # BLD AUTO: 6 10E3/UL (ref 1.6–8.3)
NEUTROPHILS NFR BLD AUTO: 59 %
NRBC # BLD AUTO: 0 10E3/UL
NRBC BLD AUTO-RTO: 0 /100
PLATELET # BLD AUTO: 457 10E3/UL (ref 150–450)
RBC # BLD AUTO: 4.32 10E6/UL (ref 3.8–5.2)
RETICS # AUTO: 0.04 10E6/UL (ref 0.03–0.1)
RETICS/RBC NFR AUTO: 1 % (ref 0.5–2)
WBC # BLD AUTO: 10.2 10E3/UL (ref 4–11)

## 2025-03-20 PROCEDURE — 83010 ASSAY OF HAPTOGLOBIN QUANT: CPT

## 2025-03-20 PROCEDURE — 85025 COMPLETE CBC W/AUTO DIFF WBC: CPT

## 2025-03-20 PROCEDURE — 85045 AUTOMATED RETICULOCYTE COUNT: CPT

## 2025-03-20 PROCEDURE — 99207 BLOOD MORPHOLOGY PATHOLOGIST REVIEW: CPT | Performed by: PATHOLOGY

## 2025-03-20 PROCEDURE — 36415 COLL VENOUS BLD VENIPUNCTURE: CPT

## 2025-03-21 LAB
PATH REPORT.COMMENTS IMP SPEC: NORMAL
PATH REPORT.COMMENTS IMP SPEC: NORMAL
PATH REPORT.FINAL DX SPEC: NORMAL
PATH REPORT.MICROSCOPIC SPEC OTHER STN: NORMAL
PATH REPORT.MICROSCOPIC SPEC OTHER STN: NORMAL
PATH REPORT.RELEVANT HX SPEC: NORMAL

## 2025-03-21 NOTE — RESULT ENCOUNTER NOTE
TCs - patient might need assistance scheduling the infusions. It looks like she has in the past    Eliana,    Your hemoglobin came up some, but not to the level that surgery and anesthesia want. You should have at least one more infusion. There should be additional infusions ordered available to schedule. I'll send this over to the hematologist as well.   If you have any questions, please feel free to contact the clinic.    ALEKSANDER Ocampo

## 2025-03-26 ENCOUNTER — INFUSION THERAPY VISIT (OUTPATIENT)
Dept: ONCOLOGY | Facility: CLINIC | Age: 45
End: 2025-03-26
Attending: INTERNAL MEDICINE
Payer: COMMERCIAL

## 2025-03-26 VITALS
TEMPERATURE: 98.4 F | SYSTOLIC BLOOD PRESSURE: 117 MMHG | OXYGEN SATURATION: 96 % | DIASTOLIC BLOOD PRESSURE: 68 MMHG | RESPIRATION RATE: 16 BRPM | HEART RATE: 82 BPM

## 2025-03-26 DIAGNOSIS — D50.8 IRON DEFICIENCY ANEMIA SECONDARY TO INADEQUATE DIETARY IRON INTAKE: Primary | ICD-10-CM

## 2025-03-26 PROCEDURE — 250N000011 HC RX IP 250 OP 636: Performed by: INTERNAL MEDICINE

## 2025-03-26 PROCEDURE — 258N000003 HC RX IP 258 OP 636: Performed by: INTERNAL MEDICINE

## 2025-03-26 RX ORDER — DIPHENHYDRAMINE HYDROCHLORIDE 50 MG/ML
50 INJECTION, SOLUTION INTRAMUSCULAR; INTRAVENOUS
Start: 2025-03-26

## 2025-03-26 RX ORDER — METHYLPREDNISOLONE SODIUM SUCCINATE 40 MG/ML
40 INJECTION INTRAMUSCULAR; INTRAVENOUS
Start: 2025-03-26

## 2025-03-26 RX ORDER — HEPARIN SODIUM,PORCINE 10 UNIT/ML
5-20 VIAL (ML) INTRAVENOUS DAILY PRN
OUTPATIENT
Start: 2025-03-26

## 2025-03-26 RX ORDER — DIPHENHYDRAMINE HYDROCHLORIDE 50 MG/ML
25 INJECTION, SOLUTION INTRAMUSCULAR; INTRAVENOUS
Start: 2025-03-26

## 2025-03-26 RX ORDER — ALBUTEROL SULFATE 0.83 MG/ML
2.5 SOLUTION RESPIRATORY (INHALATION)
OUTPATIENT
Start: 2025-03-26

## 2025-03-26 RX ORDER — MEPERIDINE HYDROCHLORIDE 25 MG/ML
25 INJECTION INTRAMUSCULAR; INTRAVENOUS; SUBCUTANEOUS
OUTPATIENT
Start: 2025-03-26

## 2025-03-26 RX ORDER — EPINEPHRINE 1 MG/ML
0.3 INJECTION, SOLUTION, CONCENTRATE INTRAVENOUS EVERY 5 MIN PRN
OUTPATIENT
Start: 2025-03-26

## 2025-03-26 RX ORDER — ALBUTEROL SULFATE 90 UG/1
1-2 INHALANT RESPIRATORY (INHALATION)
Start: 2025-03-26

## 2025-03-26 RX ORDER — HEPARIN SODIUM (PORCINE) LOCK FLUSH IV SOLN 100 UNIT/ML 100 UNIT/ML
5 SOLUTION INTRAVENOUS
OUTPATIENT
Start: 2025-03-26

## 2025-03-26 RX ADMIN — SODIUM CHLORIDE 25 MG: 9 INJECTION, SOLUTION INTRAVENOUS at 07:31

## 2025-03-26 RX ADMIN — SODIUM CHLORIDE 975 MG: 9 INJECTION, SOLUTION INTRAVENOUS at 08:44

## 2025-03-26 RX ADMIN — SODIUM CHLORIDE 250 ML: 0.9 INJECTION, SOLUTION INTRAVENOUS at 07:31

## 2025-03-26 NOTE — PATIENT INSTRUCTIONS
[facility] Triage and after hours / weekends / holidays:  [phone]    Please call the triage or after hours line if you experience a temperature greater than or equal to 100.4, shaking chills, have uncontrolled nausea, vomiting and/or diarrhea, dizziness, shortness of breath, chest pain, bleeding, unexplained bruising, or if you have any other new/concerning symptoms, questions or concerns.      If you are having any concerning symptoms or wish to speak to a provider before your next infusion visit, please call triage to notify them so we can adequately serve you.     If you need a refill on a narcotic prescription or other medication, please call before your infusion appointment.                March 2025 Sunday Monday Tuesday Wednesday Thursday Friday Saturday                                 1       2     3     4    LAB  11:15 AM   (15 min.)   RD LAB   [facility] 5     6    NEW NON-MALIGNANT ONC   7:45 AM   (60 min.)   Madonna Zavaleta MD   [facility] 7     8       9     10     11     12     13     14     15       16     17     18     19     20    LAB  10:30 AM   (15 min.)   UR LAB   [facility]    LAB  10:45 AM   (15 min.)   OP LAB UR   [facility] 21     22       23     24     25     26    ONC INFUSION 4 HR (240 MIN)   7:00 AM   (240 min.)    ONC INFUSION NURSE   [facility] 27     28     29       30     31 April 2025 Sunday Monday Tuesday Wednesday Thursday Friday Saturday             1     2     3     4     5       6     7     8    PRE-OPERATIVE  12:40 PM   (40 min.)   Martha Davis, CARISA PORTILLO   [facility] 9     10     11     12       13     14     15     16     17     18     19       20     21     22     23     24     25     26       27     28     29    OFFICE  VISIT  12:40 PM   (40 min.)   Martha Davis, CARISA TAN [facility] 30                                    Lab Results:  No results found for this or any previous visit (from the past 12 hours).

## 2025-03-26 NOTE — PROGRESS NOTES
Infusion Nursing Note:  Eliana Anders presents today for infed.    Patient seen by provider today: No   present during visit today: Not Applicable.    Note: Pt comes to infusion today with no questions or concerns.  Pt  has 6/10 pain in her L wrist which is not new today and denies any need for intervention at this appointment.  Pt has been afebrile and denies signs and symptoms of infection including: cough, SOB, sore throat, diarrhea, vomiting, rash, or pain with urination.  Pt wishes to proceed with today's planned treatment.    Intravenous Access:  Peripheral IV placed.    Treatment Conditions:  Not Applicable.    Post Infusion Assessment:  Patient tolerated infusion without incident.  Patient was observed for 60 minutes post infed test dose per protocol.  Blood return noted pre and post infusion.  Site patent and intact, free from redness, edema or discomfort.  No evidence of extravasations.  Access discontinued per protocol.     Discharge Plan:   Patient declined prescription refills.  Discharge instructions reviewed with: Patient.  Patient and/or family verbalized understanding of discharge instructions and all questions answered.  AVS to patient via [employer].  Patient will return 06/05/25 to see Dr. Zavaleta for next appointment.   Patient discharged in stable condition accompanied by: self.  Departure Mode: Ambulatory.      Emily Meese, RN

## 2025-03-27 ENCOUNTER — TELEPHONE (OUTPATIENT)
Dept: GASTROENTEROLOGY | Facility: CLINIC | Age: 45
End: 2025-03-27
Payer: COMMERCIAL

## 2025-03-27 NOTE — TELEPHONE ENCOUNTER
"Endoscopy Scheduling Screen    Have you had any respiratory illness or flu-like symptoms in the last 10 days?  No    What is your communication preference for Instructions and/or Bowel Prep?   MyChart    What insurance is in the chart?  Other:  [employer]    Ordering/Referring Provider:   WILL BRIGHT    (If ordering provider performs procedure, schedule with ordering provider unless otherwise instructed. )    BMI: Estimated body mass index is 44.88 kg/m  as calculated from the following:    Height as of 2/25/25: 1.715 m (5' 7.52\").    Weight as of 3/6/25: 132 kg (291 lb).     Sedation Ordered  moderate sedation.   If patient BMI > 50 do not schedule in [facility].    If patient BMI > 45 do not schedule at [facility].    Are you taking methadone or Suboxone?  NO, No RN review required.    Have you been diagnosed and are being treated for severe PTSD or severe anxiety?  NO, No RN review required.    Are you taking any prescription medications for pain 3 or more times per week?   NO, No RN review required.    Do you have a history of malignant hyperthermia?  No    (Females) Are you currently pregnant?   No     Have you been diagnosed or told you have pulmonary hypertension?   No    Do you have an LVAD?  No    Have you been told you have moderate to severe sleep apnea?  No.    Have you been told you have COPD, asthma, or any other lung disease?  No    Has your doctor ordered any cardiac tests like echo, angiogram, stress test, ablation, or EKG, that you have not completed yet?  No    Do you  have a history of any heart conditions?  No     Have you ever had or are you waiting for an organ transplant?  No. Continue scheduling, no site restrictions.    Have you had a stroke or transient ischemic attack (TIA aka \"mini stroke\") in the last 2 years?   No.    Have you been diagnosed with or been told you have cirrhosis of the liver?   No.    Are you currently on dialysis?   No    Do you need assistance transferring?   No    BMI: " "Estimated body mass index is 44.88 kg/m  as calculated from the following:    Height as of 2/25/25: 1.715 m (5' 7.52\").    Weight as of 3/6/25: 132 kg (291 lb).     Is patients BMI > 40 and scheduling location [facility]?  No    Do you take an injectable or oral medication for weight loss or diabetes (excluding insulin)?  No    Do you take the medication Naltrexone?  No    Do you take blood thinners?  No       Prep   Are you currently on dialysis or do you have chronic kidney disease?  No    Do you have a diagnosis of diabetes?  No    Do you have a diagnosis of cystic fibrosis (CF)?  No    On a regular basis do you go 3 -5 days between bowel movements?  No    BMI > 40?  Yes (Extended Prep)    Preferred Pharmacy:    [facility] #[id number] [address] AT [address] [address]  Phone: [phone] Fax: [phone]      Final Scheduling Details     Procedure scheduled  Colonoscopy / Upper endoscopy (EGD)    Surgeon:  Evan     Date of procedure:  4/18     Pre-OP / PAC:   Yes - Patient informed of pre-op requirement.    Location  [facility] - Per RN assessment.    Sedation   Moderate Sedation - Per order.      Patient Reminders:   You will receive a call from a Nurse to review instructions and health history.  This assessment must be completed prior to your procedure.  Failure to complete the Nurse assessment may result in the procedure being cancelled.      On the day of your procedure, please designate an adult(s) who can drive you home stay with you for the next 24 hours. The medicines used in the exam will make you sleepy. You will not be able to drive.      You cannot take public transportation, ride share services, or non-medical taxi service without a responsible caregiver.  Medical transport services are allowed with the requirement that a responsible caregiver will receive you at your destination.  We require that drivers and caregivers are confirmed prior to " your procedure.

## 2025-04-03 ENCOUNTER — TELEPHONE (OUTPATIENT)
Dept: GASTROENTEROLOGY | Facility: CLINIC | Age: 45
End: 2025-04-03
Payer: COMMERCIAL

## 2025-04-03 DIAGNOSIS — D50.8 IRON DEFICIENCY ANEMIA SECONDARY TO INADEQUATE DIETARY IRON INTAKE: Primary | ICD-10-CM

## 2025-04-03 RX ORDER — BISACODYL 5 MG/1
TABLET, DELAYED RELEASE ORAL
Qty: 4 TABLET | Refills: 0 | Status: SHIPPED | OUTPATIENT
Start: 2025-04-03

## 2025-04-03 NOTE — TELEPHONE ENCOUNTER
Pre visit planning completed.      Procedure details:    Patient scheduled for Colonoscopy/Upper endoscopy (EGD) on 4/18/25.     Arrival time: 1000. Procedure time 1100    Facility location: [facility]; [address], [address], [place] [address]. Check in location: Main entrance at registration desk.  *Disclaimer: Drivers are to check in with patient and stay on campus during procedure.     Sedation type: Conscious sedation     Pre op exam needed? No.    Indication for procedure: unexplained iron deficiency, intermittent nausea and vomiting      Chart review:     Electronic implanted devices? No    Recent diagnosis of diverticulitis within the last 6 weeks? No      Medication review:    Diabetic? No    Anticoagulants? No    Weight loss medication/injectable? No GLP-1 medication per patient's medication list. Nursing to verify with pre-assessment call.    Other medication HOLDING recommendations:  Cannabis/Marijuana: Stop night before procedure.  Ferrous sulfate (iron supplements): HOLD 7 days before procedure.      Prep for procedure:     Bowel prep recommendation: Extended Golytely. Bowel prep sent to [facility] #[id number] - [place], [place] - [address] AT [address].  Due to: BMI > 40    Procedure information and instructions sent via yuly Bermudez RN  Endoscopy Procedure Pre Assessment   [phone] option 3

## 2025-04-03 NOTE — TELEPHONE ENCOUNTER
Pre assessment completed for upcoming procedure.   (Please see previous telephone encounter notes for complete details)      Procedure details:    Arrival time and facility location reviewed.    Pre op exam needed? No.    Designated  policy reviewed and that site requests drivers to check in and stay on campus. Instructed to have someone stay 6  hours post procedure.   *Disclaimer - please notify the [facility] Op Manager with any  issues/concerns.    Medication review:    Medications reviewed. Please see supporting documentation below. Holding recommendations discussed (if applicable).     Patient is no longer taking Ferrous sulfate.    Prep for procedure:    Procedure prep instructions reviewed.        Any additional information needed:  N/A      Patient verbalized understanding and had no questions or concerns at this time.      Nicole Haskins LPN  Endoscopy Procedure Pre Assessment   [phone] option 3

## 2025-04-07 ENCOUNTER — MYC MEDICAL ADVICE (OUTPATIENT)
Dept: FAMILY MEDICINE | Facility: CLINIC | Age: 45
End: 2025-04-07
Payer: COMMERCIAL

## 2025-04-08 ENCOUNTER — LAB (OUTPATIENT)
Dept: LAB | Facility: CLINIC | Age: 45
End: 2025-04-08
Payer: COMMERCIAL

## 2025-04-08 ENCOUNTER — OFFICE VISIT (OUTPATIENT)
Dept: FAMILY MEDICINE | Facility: CLINIC | Age: 45
End: 2025-04-08
Payer: COMMERCIAL

## 2025-04-08 VITALS
DIASTOLIC BLOOD PRESSURE: 80 MMHG | OXYGEN SATURATION: 98 % | TEMPERATURE: 97.4 F | RESPIRATION RATE: 18 BRPM | SYSTOLIC BLOOD PRESSURE: 138 MMHG | WEIGHT: 293 LBS | HEART RATE: 90 BPM | HEIGHT: 69 IN | BODY MASS INDEX: 43.4 KG/M2

## 2025-04-08 DIAGNOSIS — I47.29 OTHER VENTRICULAR TACHYCARDIA (H): ICD-10-CM

## 2025-04-08 DIAGNOSIS — E66.09 CLASS 1 OBESITY DUE TO EXCESS CALORIES WITHOUT SERIOUS COMORBIDITY WITH BODY MASS INDEX (BMI) OF 30.0 TO 30.9 IN ADULT: ICD-10-CM

## 2025-04-08 DIAGNOSIS — E66.01 MORBID OBESITY (H): ICD-10-CM

## 2025-04-08 DIAGNOSIS — J30.2 SEASONAL ALLERGIC RHINITIS, UNSPECIFIED TRIGGER: ICD-10-CM

## 2025-04-08 DIAGNOSIS — I49.3 PVC'S (PREMATURE VENTRICULAR CONTRACTIONS): ICD-10-CM

## 2025-04-08 DIAGNOSIS — D50.8 IRON DEFICIENCY ANEMIA SECONDARY TO INADEQUATE DIETARY IRON INTAKE: ICD-10-CM

## 2025-04-08 DIAGNOSIS — Z01.818 PRE-OPERATIVE EXAMINATION: ICD-10-CM

## 2025-04-08 DIAGNOSIS — I10 BENIGN ESSENTIAL HYPERTENSION: ICD-10-CM

## 2025-04-08 DIAGNOSIS — G56.02 CARPAL TUNNEL SYNDROME OF LEFT WRIST: ICD-10-CM

## 2025-04-08 DIAGNOSIS — G47.33 OSA (OBSTRUCTIVE SLEEP APNEA): ICD-10-CM

## 2025-04-08 DIAGNOSIS — Z01.818 PRE-OPERATIVE EXAMINATION: Primary | ICD-10-CM

## 2025-04-08 DIAGNOSIS — E66.811 CLASS 1 OBESITY DUE TO EXCESS CALORIES WITHOUT SERIOUS COMORBIDITY WITH BODY MASS INDEX (BMI) OF 30.0 TO 30.9 IN ADULT: ICD-10-CM

## 2025-04-08 DIAGNOSIS — M19.92 POST-TRAUMATIC ARTHROSIS OF JOINT: ICD-10-CM

## 2025-04-08 LAB
ANION GAP SERPL CALCULATED.3IONS-SCNC: 11 MMOL/L (ref 7–15)
BASOPHILS # BLD AUTO: 0 10E3/UL (ref 0–0.2)
BASOPHILS NFR BLD AUTO: 1 %
BUN SERPL-MCNC: 15.9 MG/DL (ref 6–20)
CALCIUM SERPL-MCNC: 9.5 MG/DL (ref 8.8–10.4)
CHLORIDE SERPL-SCNC: 105 MMOL/L (ref 98–107)
CREAT SERPL-MCNC: 0.69 MG/DL (ref 0.51–0.95)
EGFRCR SERPLBLD CKD-EPI 2021: >90 ML/MIN/1.73M2
EOSINOPHIL # BLD AUTO: 0.1 10E3/UL (ref 0–0.7)
EOSINOPHIL NFR BLD AUTO: 2 %
ERYTHROCYTE [DISTWIDTH] IN BLOOD BY AUTOMATED COUNT: 29.7 % (ref 10–15)
FERRITIN SERPL-MCNC: 269 NG/ML (ref 6–175)
GLUCOSE SERPL-MCNC: 135 MG/DL (ref 70–99)
HCO3 SERPL-SCNC: 25 MMOL/L (ref 22–29)
HCT VFR BLD AUTO: 37.1 % (ref 35–47)
HGB BLD-MCNC: 11.1 G/DL (ref 11.7–15.7)
IMM GRANULOCYTES # BLD: 0 10E3/UL
IMM GRANULOCYTES NFR BLD: 0 %
IRON BINDING CAPACITY (ROCHE): 270 UG/DL (ref 240–430)
IRON SATN MFR SERPL: 25 % (ref 15–46)
IRON SERPL-MCNC: 67 UG/DL (ref 37–145)
LYMPHOCYTES # BLD AUTO: 1.8 10E3/UL (ref 0.8–5.3)
LYMPHOCYTES NFR BLD AUTO: 27 %
MCH RBC QN AUTO: 24.3 PG (ref 26.5–33)
MCHC RBC AUTO-ENTMCNC: 29.9 G/DL (ref 31.5–36.5)
MCV RBC AUTO: 81 FL (ref 78–100)
MONOCYTES # BLD AUTO: 0.5 10E3/UL (ref 0–1.3)
MONOCYTES NFR BLD AUTO: 8 %
NEUTROPHILS # BLD AUTO: 4.1 10E3/UL (ref 1.6–8.3)
NEUTROPHILS NFR BLD AUTO: 62 %
NRBC # BLD AUTO: 0 10E3/UL
NRBC BLD AUTO-RTO: 0 /100
PLATELET # BLD AUTO: 391 10E3/UL (ref 150–450)
POTASSIUM SERPL-SCNC: 4.8 MMOL/L (ref 3.4–5.3)
RBC # BLD AUTO: 4.56 10E6/UL (ref 3.8–5.2)
RETICS # AUTO: 0.06 10E6/UL (ref 0.03–0.1)
RETICS/RBC NFR AUTO: 1.3 % (ref 0.5–2)
SODIUM SERPL-SCNC: 141 MMOL/L (ref 135–145)
WBC # BLD AUTO: 6.6 10E3/UL (ref 4–11)

## 2025-04-08 PROCEDURE — 85025 COMPLETE CBC W/AUTO DIFF WBC: CPT

## 2025-04-08 PROCEDURE — 3075F SYST BP GE 130 - 139MM HG: CPT | Performed by: NURSE PRACTITIONER

## 2025-04-08 PROCEDURE — G2211 COMPLEX E/M VISIT ADD ON: HCPCS | Performed by: NURSE PRACTITIONER

## 2025-04-08 PROCEDURE — 82728 ASSAY OF FERRITIN: CPT

## 2025-04-08 PROCEDURE — 83550 IRON BINDING TEST: CPT

## 2025-04-08 PROCEDURE — 83540 ASSAY OF IRON: CPT

## 2025-04-08 PROCEDURE — 85045 AUTOMATED RETICULOCYTE COUNT: CPT

## 2025-04-08 PROCEDURE — 36415 COLL VENOUS BLD VENIPUNCTURE: CPT

## 2025-04-08 PROCEDURE — 80048 BASIC METABOLIC PNL TOTAL CA: CPT

## 2025-04-08 PROCEDURE — 99215 OFFICE O/P EST HI 40 MIN: CPT | Performed by: NURSE PRACTITIONER

## 2025-04-08 PROCEDURE — 3079F DIAST BP 80-89 MM HG: CPT | Performed by: NURSE PRACTITIONER

## 2025-04-08 RX ORDER — FERROUS SULFATE 325(65) MG
325 TABLET, DELAYED RELEASE (ENTERIC COATED) ORAL EVERY OTHER DAY
Qty: 30 TABLET | Refills: 2 | Status: CANCELLED | OUTPATIENT
Start: 2025-04-08
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
## 2025-04-08 NOTE — PATIENT INSTRUCTIONS
Bring home cpap to surgery    Medication Instructions:    - Herbal medications and vitamins: DO NOT TAKE 14 days prior to surgery.   - Calcium Channel Blockers (amlodipine, diltiazem, felodipine): May be continued on the day of surgery/night before   - ibuprofen (Advil, Motrin): DO NOT TAKE 1 day before surgery    - Topicals: DO NOT TAKE day of surgery   - cetirizine or loratidine: continue as typical    Take blood pressures at home and send me a LAVEGO message with the values for a few days.

## 2025-04-09 DIAGNOSIS — D50.8 IRON DEFICIENCY ANEMIA SECONDARY TO INADEQUATE DIETARY IRON INTAKE: Primary | ICD-10-CM

## 2025-04-09 NOTE — RESULT ENCOUNTER NOTE
Eliana,    Your kidney function and electrolytes look normal. The official hemoglobin came back even higher than the preliminary!  Hematocrit and red blood cell size are also back in normal range, as are platelets. All moving in the right direction. We'll get this sent off to surgery. If you have any questions, please feel free to contact the clinic.    ALEKSANDER Ocampo

## 2025-05-06 ENCOUNTER — TRANSFERRED RECORDS (OUTPATIENT)
Dept: HEALTH INFORMATION MANAGEMENT | Facility: CLINIC | Age: 45
End: 2025-05-06
Payer: COMMERCIAL

## 2025-05-11 ENCOUNTER — HEALTH MAINTENANCE LETTER (OUTPATIENT)
Age: 45
End: 2025-05-11

## 2025-05-27 ENCOUNTER — TRANSFERRED RECORDS (OUTPATIENT)
Dept: HEALTH INFORMATION MANAGEMENT | Facility: CLINIC | Age: 45
End: 2025-05-27
Payer: COMMERCIAL

## 2025-05-28 ENCOUNTER — TELEPHONE (OUTPATIENT)
Dept: GASTROENTEROLOGY | Facility: CLINIC | Age: 45
End: 2025-05-28
Payer: COMMERCIAL

## 2025-05-28 NOTE — TELEPHONE ENCOUNTER
Pre visit planning completed.    Procedure details:    Patient scheduled for Colonoscopy/Upper endoscopy (EGD) on 06/13/2025.     Arrival time: 0745. Procedure time 0845    [facility]; [address], 3rd Floor, [place] [address]. Check in location: Main entrance at registration desk.  *Disclaimer: Drivers are to check in with patient and stay on campus during procedure.     Sedation type: Conscious sedation     Pre op exam needed? No.    Indication for procedure:   D50.8 (ICD-10-CM) - Iron deficiency anemia secondary to inadequate dietary iron intake     Chart review:     Electronic implanted devices? No    Recent diagnosis of diverticulitis within the last 6 weeks? No    Medication review:    Diabetic? No    Anticoagulants? No    Weight loss medication/injectable? No GLP-1 medication per patient's medication list. Nursing to verify with pre-assessment call.    Other medication HOLDING recommendations:  Cannabis/Marijuana: Stop night before procedure.  Ferrous sulfate (iron supplements): HOLD 7 days before procedure.    Prep for procedure:     Bowel prep recommendation: Extended Golytely. Bowel prep sent to [facility] #[id number] - [place], [place] - [address].  Due to: BMI > 40    Procedure information and instructions sent via yuly Pyle RN  Endoscopy Procedure Pre Assessment   [phone] option 3

## 2025-05-28 NOTE — TELEPHONE ENCOUNTER
Pre assessment completed for upcoming procedure.   (Please see previous telephone encounter notes for complete details)    Patient did complete pre assessment back in April 2025 and verifies nothing has changed.      Procedure details:    Arrival time and facility location reviewed.    Pre op exam needed? No.    Designated  policy reviewed and that site requests drivers to check in and stay on campus. Instructed to have someone stay 6  hours post procedure.   *Disclaimer - please notify the [facility] Op Manager with any  issues/concerns.    Medication review:    Medications reviewed. Please see supporting documentation below. Holding recommendations discussed (if applicable).     Patient is not taking ferrous sulfate.    Prep for procedure:    Procedure prep instructions reviewed.  Patient still has prep from previously scheduled procedure.       Any additional information needed:  N/A      Patient verbalized understanding and had no questions or concerns at this time.      Nicole Haskins LPN  Endoscopy Procedure Pre Assessment   [phone] option 3

## 2025-06-13 ENCOUNTER — HOSPITAL ENCOUNTER (OUTPATIENT)
Facility: CLINIC | Age: 45
Discharge: HOME | End: 2025-06-13
Attending: STUDENT IN AN ORGANIZED HEALTH CARE EDUCATION/TRAINING PROGRAM | Admitting: STUDENT IN AN ORGANIZED HEALTH CARE EDUCATION/TRAINING PROGRAM
Payer: COMMERCIAL

## 2025-06-13 VITALS
HEART RATE: 89 BPM | RESPIRATION RATE: 16 BRPM | SYSTOLIC BLOOD PRESSURE: 146 MMHG | OXYGEN SATURATION: 98 % | DIASTOLIC BLOOD PRESSURE: 84 MMHG

## 2025-06-13 LAB — UPPER GI ENDOSCOPY: NORMAL

## 2025-06-13 PROCEDURE — 45378 DIAGNOSTIC COLONOSCOPY: CPT | Performed by: STUDENT IN AN ORGANIZED HEALTH CARE EDUCATION/TRAINING PROGRAM

## 2025-06-13 PROCEDURE — 43235 EGD DIAGNOSTIC BRUSH WASH: CPT | Mod: 74 | Performed by: STUDENT IN AN ORGANIZED HEALTH CARE EDUCATION/TRAINING PROGRAM

## 2025-06-13 PROCEDURE — 99153 MOD SED SAME PHYS/QHP EA: CPT | Performed by: STUDENT IN AN ORGANIZED HEALTH CARE EDUCATION/TRAINING PROGRAM

## 2025-06-13 PROCEDURE — G0500 MOD SEDAT ENDO SERVICE >5YRS: HCPCS | Performed by: STUDENT IN AN ORGANIZED HEALTH CARE EDUCATION/TRAINING PROGRAM

## 2025-06-13 PROCEDURE — 250N000011 HC RX IP 250 OP 636: Performed by: STUDENT IN AN ORGANIZED HEALTH CARE EDUCATION/TRAINING PROGRAM

## 2025-06-13 RX ORDER — FENTANYL CITRATE 50 UG/ML
INJECTION, SOLUTION INTRAMUSCULAR; INTRAVENOUS PRN
Status: DISCONTINUED | OUTPATIENT
Start: 2025-06-13 | End: 2025-06-13 | Stop reason: HOSPADM

## 2025-06-13 ASSESSMENT — ACTIVITIES OF DAILY LIVING (ADL)
ADLS_ACUITY_SCORE: 41

## 2025-06-13 NOTE — H&P
Patient presented for EGD with conscious sedation. Baseline oxygen saturations at 96-97%.  Sedation was administered to keep the patient comfortable. Midazolam 9 and fentanyl 75 was given to the patient. However, patient remained fully awake.  With saturations in the 92-94% range, we decided to proceed with starting the EGD.  The endoscope was advanced, however the patient was not comfortable and experienced continuous retching and gagging, resulting in reflux of stomach contents into the esophagus.  Due to the patient s intolerance of the procedure and borderline oxygen saturations and stomach full of liquids, we were unable to administer further sedation. The case was discussed with anesthesiology regarding possible conversion to MAC, however they were not available to help with the case.  At this point, the plan was made to reschedule the case with MAC.

## 2025-06-13 NOTE — OR NURSING
EGD and colonoscopy attempted under conscious sedation but aborted d/t insufficient sedation. Recommended repeat under MAC sedation. Pt tolerated procedure.

## 2025-06-17 ENCOUNTER — TELEPHONE (OUTPATIENT)
Dept: GASTROENTEROLOGY | Facility: CLINIC | Age: 45
End: 2025-06-17
Payer: COMMERCIAL

## 2025-06-17 NOTE — TELEPHONE ENCOUNTER
Screening questions completed 3/27/2025 - no changes.     Preferred Pharmacy:    [id number] - [place] [id number] [facility] AT [address]  [address] [address]  Phone: [phone] Fax: [phone]      Final Scheduling Details     Procedure scheduled  Colonoscopy / Upper endoscopy (EGD)    Surgeon:  ROM     Date of procedure:  7/31/2025     Pre-OP / PAC:   Yes - Patient informed of pre-op requirement.    Location  [facility]    Sedation   MAC/Deep Sedation - Per order.      Patient Reminders:   You will receive a call from a Nurse to review instructions and health history.  This assessment must be completed prior to your procedure.  Failure to complete the Nurse assessment may result in the procedure being cancelled.      On the day of your procedure, please designate an adult(s) who can drive you home stay with you for the next 24 hours. The medicines used in the exam will make you sleepy. You will not be able to drive.      You cannot take public transportation, ride share services, or non-medical taxi service without a responsible caregiver.  Medical transport services are allowed with the requirement that a responsible caregiver will receive you at your destination.  We require that drivers and caregivers are confirmed prior to your procedure.

## 2025-06-25 ENCOUNTER — TRANSFERRED RECORDS (OUTPATIENT)
Dept: HEALTH INFORMATION MANAGEMENT | Facility: CLINIC | Age: 45
End: 2025-06-25
Payer: COMMERCIAL

## 2025-07-16 ENCOUNTER — TELEPHONE (OUTPATIENT)
Dept: GASTROENTEROLOGY | Facility: CLINIC | Age: 45
End: 2025-07-16
Payer: COMMERCIAL

## 2025-07-16 DIAGNOSIS — D50.8 IRON DEFICIENCY ANEMIA SECONDARY TO INADEQUATE DIETARY IRON INTAKE: Primary | ICD-10-CM

## 2025-07-16 RX ORDER — BISACODYL 5 MG/1
TABLET, DELAYED RELEASE ORAL
Qty: 4 TABLET | Refills: 0 | Status: SHIPPED | OUTPATIENT
Start: 2025-07-16

## 2025-07-16 NOTE — TELEPHONE ENCOUNTER
Rescheduled Colonoscopy/Upper endoscopy (EGD)  Due to needing MAC sedation.-6-13-25 EGD was aborted d/t poor sedation.     Pre visit planning completed.      Procedure details:    Patient scheduled for Colonoscopy/Upper endoscopy (EGD) on 7-31-25.     Arrival time: 0800. Procedure time 0900    Facility location: [facility]; [facility] [place] [address]. Check in location: 1st Floor [facility] [place].     Sedation type: MAC hx poor sedation     Pre op exam needed? Yes. Pre op exam is scheduled on 7-22-25 with Martha Davis APRN CNP.    Indication for procedure:   Iron deficiency anemia secondary to inadequate dietary iron intake [D50.8]  - Primary          Chart review:     Electronic implanted devices? No    Recent diagnosis of diverticulitis within the last 6 weeks? No      Medication review:    Diabetic? No    Anticoagulants? No    Weight loss medication/injectable? No.    Other medication HOLDING recommendations:  N/A      Prep for procedure:     Bowel prep recommendation: Extended Golytely. Bowel prep sent to [facility] #[id number] - [place], [place] - [address] AT [address] [facility] [address].  Due to: BMI > 40    Prep instructions sent via [employer]         Mica Edge RN  Endoscopy Procedure Pre Assessment   [phone] option 3

## 2025-07-16 NOTE — TELEPHONE ENCOUNTER
Pre assessment completed for upcoming procedure.   (Please see previous telephone encounter notes for complete details)    Procedure details:    Procedure date 7/31/25, arrival time 0800 and facility location reviewed.    Pre op exam needed? Yes. Pre op exam is scheduled on 7/22/25.    Designated  policy reviewed. Instructed to have someone stay 24  hours post procedure.       Medication review:    Medications reviewed. Please see supporting documentation below. Holding recommendations discussed (if applicable).       Prep for procedure:     Procedure prep instructions reviewed.        Any additional information needed:  N/A      Patient verbalized understanding and had no questions or concerns at this time.      Nicole Vergara RN  Endoscopy Procedure Pre Assessment   [phone] option 3

## 2025-07-22 ENCOUNTER — OFFICE VISIT (OUTPATIENT)
Dept: FAMILY MEDICINE | Facility: CLINIC | Age: 45
End: 2025-07-22
Payer: COMMERCIAL

## 2025-07-22 VITALS
SYSTOLIC BLOOD PRESSURE: 132 MMHG | WEIGHT: 293 LBS | HEART RATE: 100 BPM | HEIGHT: 68 IN | RESPIRATION RATE: 22 BRPM | DIASTOLIC BLOOD PRESSURE: 82 MMHG | TEMPERATURE: 99.3 F | OXYGEN SATURATION: 97 % | BODY MASS INDEX: 44.41 KG/M2

## 2025-07-22 DIAGNOSIS — Z12.31 VISIT FOR SCREENING MAMMOGRAM: ICD-10-CM

## 2025-07-22 DIAGNOSIS — Z12.4 CERVICAL CANCER SCREENING: ICD-10-CM

## 2025-07-22 DIAGNOSIS — I10 BENIGN ESSENTIAL HYPERTENSION: ICD-10-CM

## 2025-07-22 DIAGNOSIS — D50.8 IRON DEFICIENCY ANEMIA SECONDARY TO INADEQUATE DIETARY IRON INTAKE: ICD-10-CM

## 2025-07-22 DIAGNOSIS — Z01.818 PREOP GENERAL PHYSICAL EXAM: Primary | ICD-10-CM

## 2025-07-22 DIAGNOSIS — J30.2 SEASONAL ALLERGIC RHINITIS, UNSPECIFIED TRIGGER: ICD-10-CM

## 2025-07-22 PROBLEM — R03.0 ELEVATED BLOOD PRESSURE READING WITHOUT DIAGNOSIS OF HYPERTENSION: Status: RESOLVED | Noted: 2017-09-28 | Resolved: 2025-07-22

## 2025-07-22 LAB
ANION GAP SERPL CALCULATED.3IONS-SCNC: 12 MMOL/L (ref 7–15)
BASOPHILS # BLD AUTO: 0 10E3/UL (ref 0–0.2)
BASOPHILS NFR BLD AUTO: 0 %
BUN SERPL-MCNC: 17.3 MG/DL (ref 6–20)
CALCIUM SERPL-MCNC: 9.3 MG/DL (ref 8.8–10.4)
CHLORIDE SERPL-SCNC: 106 MMOL/L (ref 98–107)
CREAT SERPL-MCNC: 0.82 MG/DL (ref 0.51–0.95)
EGFRCR SERPLBLD CKD-EPI 2021: 89 ML/MIN/1.73M2
EOSINOPHIL # BLD AUTO: 0.1 10E3/UL (ref 0–0.7)
EOSINOPHIL NFR BLD AUTO: 1 %
ERYTHROCYTE [DISTWIDTH] IN BLOOD BY AUTOMATED COUNT: 14.9 % (ref 10–15)
GLUCOSE SERPL-MCNC: 99 MG/DL (ref 70–99)
HCO3 SERPL-SCNC: 22 MMOL/L (ref 22–29)
HCT VFR BLD AUTO: 38.4 % (ref 35–47)
HGB BLD-MCNC: 12.3 G/DL (ref 11.7–15.7)
IMM GRANULOCYTES # BLD: 0 10E3/UL
IMM GRANULOCYTES NFR BLD: 0 %
LYMPHOCYTES # BLD AUTO: 2.8 10E3/UL (ref 0.8–5.3)
LYMPHOCYTES NFR BLD AUTO: 29 %
MCH RBC QN AUTO: 29.4 PG (ref 26.5–33)
MCHC RBC AUTO-ENTMCNC: 32 G/DL (ref 31.5–36.5)
MCV RBC AUTO: 92 FL (ref 78–100)
MONOCYTES # BLD AUTO: 0.7 10E3/UL (ref 0–1.3)
MONOCYTES NFR BLD AUTO: 7 %
NEUTROPHILS # BLD AUTO: 6 10E3/UL (ref 1.6–8.3)
NEUTROPHILS NFR BLD AUTO: 62 %
PLATELET # BLD AUTO: 356 10E3/UL (ref 150–450)
POTASSIUM SERPL-SCNC: 4.1 MMOL/L (ref 3.4–5.3)
RBC # BLD AUTO: 4.19 10E6/UL (ref 3.8–5.2)
SODIUM SERPL-SCNC: 140 MMOL/L (ref 135–145)
WBC # BLD AUTO: 9.6 10E3/UL (ref 4–11)

## 2025-07-22 PROCEDURE — 36415 COLL VENOUS BLD VENIPUNCTURE: CPT | Performed by: NURSE PRACTITIONER

## 2025-07-22 PROCEDURE — 1125F AMNT PAIN NOTED PAIN PRSNT: CPT | Performed by: NURSE PRACTITIONER

## 2025-07-22 PROCEDURE — 3079F DIAST BP 80-89 MM HG: CPT | Performed by: NURSE PRACTITIONER

## 2025-07-22 PROCEDURE — 3075F SYST BP GE 130 - 139MM HG: CPT | Performed by: NURSE PRACTITIONER

## 2025-07-22 PROCEDURE — 90636 HEP A/HEP B VACC ADULT IM: CPT | Performed by: NURSE PRACTITIONER

## 2025-07-22 PROCEDURE — 90471 IMMUNIZATION ADMIN: CPT | Performed by: NURSE PRACTITIONER

## 2025-07-22 PROCEDURE — 99214 OFFICE O/P EST MOD 30 MIN: CPT | Mod: 25 | Performed by: NURSE PRACTITIONER

## 2025-07-22 PROCEDURE — 85025 COMPLETE CBC W/AUTO DIFF WBC: CPT | Performed by: NURSE PRACTITIONER

## 2025-07-22 PROCEDURE — 80048 BASIC METABOLIC PNL TOTAL CA: CPT | Performed by: NURSE PRACTITIONER

## 2025-07-22 PROCEDURE — G2211 COMPLEX E/M VISIT ADD ON: HCPCS | Performed by: NURSE PRACTITIONER

## 2025-07-22 RX ORDER — AMLODIPINE BESYLATE 5 MG/1
5 TABLET ORAL DAILY
Qty: 90 TABLET | Refills: 1 | Status: SHIPPED | OUTPATIENT
Start: 2025-07-22

## 2025-07-22 RX ORDER — FLUTICASONE PROPIONATE 50 MCG
1 SPRAY, SUSPENSION (ML) NASAL DAILY
Qty: 16 G | Refills: 5 | Status: SHIPPED | OUTPATIENT
Start: 2025-07-22

## 2025-07-22 ASSESSMENT — PAIN SCALES - GENERAL: PAINLEVEL_OUTOF10: MODERATE PAIN (5)
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
## 2025-07-22 NOTE — PATIENT INSTRUCTIONS
How to Take Your Medication Before Surgery  Preoperative Medication Instructions   Antiplatelet or Anticoagulation Medication Instructions   - We reviewed the medication list and the patient is not on an antiplatelet or anticoagulation medications.    Additional Medication Instructions  Take all scheduled medications on the day of surgery EXCEPT for modifications listed below:   - Calcium Channel Blockers (amlodipine): May be continued on the day of surgery.  - ok  to take oral antihistamine (claritin or zyrtec) at night per your normal routine  - ok to take flonase       Patient Education   Preparing for Your Surgery  For Adults  Getting started  In most cases, a nurse will call to review your health history and instructions. They will give you an arrival time based on your scheduled surgery time. Please be ready to share:  Your doctor's clinic name and phone number  Your medical, surgical, and anesthesia history  A list of allergies and sensitivities  A list of medicines, including herbal treatments and over-the-counter drugs  Whether the patient has a legal guardian (ask how to send us the papers in advance)  Note: You may not receive a call if you were seen at our PAC (Preoperative Assessment Center).  Please tell us if you're pregnant--or if there's any chance you might be pregnant. Some surgeries may injure a fetus (unborn baby), so they require a pregnancy test. Surgeries that are safe for a fetus don't always need a test, and you can choose whether to have one.   Preparing for surgery  Within 10 to 30 days of surgery: Have a pre-op exam (sometimes called an H&P, or History and Physical). This can be done at a clinic or pre-operative center.  If you're having a , you may not need this exam. Talk to your care team.  At your pre-op exam, talk to your care team about all medicines you take. (This includes CBD oil and any drugs, such as THC, marijuana, and other forms of cannabis.) If you need to stop  any medicine before surgery, ask when to start taking it again.  This is for your safety. Many medicines and drugs can make you bleed too much during surgery. Some change how well surgery (anesthesia) drugs work.  Call your insurance company to let them know you're having surgery. (If you don't have insurance, call [phone].)  Call your clinic if there's any change in your health. This includes a scrape or scratch near the surgery site, or any signs of a cold (sore throat, runny nose, cough, rash, fever).  Eating and drinking guidelines  For your safety: Unless your surgeon tells you otherwise, follow the guidelines below.  Eat and drink as normal until 8 hours before you arrive for surgery. After that, no food or milk. You can spit out gum when you arrive.  Drink clear liquids until 2 hours before you arrive. These are liquids you can see through, like water, Gatorade, and Propel Water. They also include plain black coffee and tea (no cream or milk).  No alcohol for 24 hours before you arrive. The night before surgery, stop any drinks that contain THC.  If your care team tells you to take medicine on the morning of surgery, it's okay to take it with a sip of water. No other medicines or drugs are allowed (including CBD oil)--follow your care team's instructions.  If you have questions the day of surgery, call your hospital or surgery center.   Preventing infection  Shower or bathe the night before and the morning of surgery. Follow the instructions your clinic gave you. (If no instructions, use regular soap.)  Don't shave or clip hair near your surgery site. We'll remove the hair if needed.  Don't smoke or vape the morning of surgery. No chewing tobacco for 6 hours before you arrive. A nicotine patch is okay. You may spit out nicotine gum when you arrive.  For some surgeries, the surgeon will tell you to fully quit smoking and nicotine.  We will make every effort to keep you safe from infection. We will:  Clean  our hands often with soap and water (or an alcohol-based hand rub).  Clean the skin at your surgery site with a special soap that kills germs.  Give you a special gown to keep you warm. (Cold raises the risk of infection.)  Wear hair covers, masks, gowns, and gloves during surgery.  Give antibiotic medicine, if prescribed. Not all surgeries need this medicine.  What to bring on the day of surgery  Photo ID and insurance card  Copy of your health care directive, if you have one  Glasses and hearing aids (bring cases)  You can't wear contacts during surgery  Inhaler and eye drops, if you use them (tell us about these when you arrive)  CPAP machine or breathing device, if you use them  A few personal items, if spending the night  If you have . . .  A pacemaker, ICD (cardiac defibrillator), or other implant: Bring the ID card.  An implanted stimulator: Bring the remote control.  A legal guardian: Bring a copy of the certified (court-stamped) guardianship papers.  Please remove any jewelry, including body piercings. Leave jewelry and other valuables at home.  If you're going home the day of surgery  You must have a support person drive you home. They should stay with you overnight, and they may need to help with your self-care.  If you don't have a support person, please tells us as soon as possible. We can help.  After surgery  If it's hard to control your pain or you need more pain medicine, please call your surgeon's office.  Questions?   If you have any questions for your care team, list them here:   ____________________________________________________________________________________________________________________________________________________________________________________________________________________________________________________________  For informational purposes only. Not to replace the advice of your health care provider. Copyright   2003, 2019 [facility]. All rights reserved. Clinically  reviewed by Kishore Pichardo MD. [employer] [id number] - REV 02/25.

## 2025-07-23 ENCOUNTER — TRANSFERRED RECORDS (OUTPATIENT)
Dept: HEALTH INFORMATION MANAGEMENT | Facility: CLINIC | Age: 45
End: 2025-07-23
Payer: COMMERCIAL

## 2025-07-31 ENCOUNTER — ANESTHESIA (OUTPATIENT)
Dept: GASTROENTEROLOGY | Facility: CLINIC | Age: 45
End: 2025-07-31
Payer: COMMERCIAL

## 2025-07-31 ENCOUNTER — ANESTHESIA EVENT (OUTPATIENT)
Dept: GASTROENTEROLOGY | Facility: CLINIC | Age: 45
End: 2025-07-31
Payer: COMMERCIAL

## 2025-07-31 ENCOUNTER — HOSPITAL ENCOUNTER (OUTPATIENT)
Facility: CLINIC | Age: 45
Discharge: HOME | End: 2025-07-31
Attending: INTERNAL MEDICINE | Admitting: INTERNAL MEDICINE
Payer: COMMERCIAL

## 2025-07-31 VITALS
DIASTOLIC BLOOD PRESSURE: 76 MMHG | OXYGEN SATURATION: 100 % | SYSTOLIC BLOOD PRESSURE: 141 MMHG | WEIGHT: 293 LBS | HEIGHT: 68 IN | HEART RATE: 75 BPM | BODY MASS INDEX: 44.41 KG/M2 | RESPIRATION RATE: 17 BRPM

## 2025-07-31 DIAGNOSIS — D50.8 IRON DEFICIENCY ANEMIA SECONDARY TO INADEQUATE DIETARY IRON INTAKE: Primary | ICD-10-CM

## 2025-07-31 DIAGNOSIS — K21.9 GASTROESOPHAGEAL REFLUX DISEASE WITHOUT ESOPHAGITIS: ICD-10-CM

## 2025-07-31 LAB
COLONOSCOPY: NORMAL
UPPER GI ENDOSCOPY: NORMAL

## 2025-07-31 PROCEDURE — 370N000017 HC ANESTHESIA TECHNICAL FEE, PER MIN: Performed by: INTERNAL MEDICINE

## 2025-07-31 PROCEDURE — 43239 EGD BIOPSY SINGLE/MULTIPLE: CPT | Performed by: INTERNAL MEDICINE

## 2025-07-31 PROCEDURE — 258N000003 HC RX IP 258 OP 636: Performed by: NURSE ANESTHETIST, CERTIFIED REGISTERED

## 2025-07-31 PROCEDURE — 88305 TISSUE EXAM BY PATHOLOGIST: CPT | Mod: TC | Performed by: INTERNAL MEDICINE

## 2025-07-31 PROCEDURE — 250N000009 HC RX 250: Performed by: NURSE ANESTHETIST, CERTIFIED REGISTERED

## 2025-07-31 PROCEDURE — 45385 COLONOSCOPY W/LESION REMOVAL: CPT | Performed by: INTERNAL MEDICINE

## 2025-07-31 PROCEDURE — 999N000010 HC STATISTIC ANES STAT CODE-CRNA PER MINUTE: Performed by: INTERNAL MEDICINE

## 2025-07-31 PROCEDURE — 88305 TISSUE EXAM BY PATHOLOGIST: CPT | Mod: 26 | Performed by: PATHOLOGY

## 2025-07-31 PROCEDURE — 250N000011 HC RX IP 250 OP 636: Performed by: NURSE ANESTHETIST, CERTIFIED REGISTERED

## 2025-07-31 RX ORDER — PROPOFOL 10 MG/ML
INJECTION, EMULSION INTRAVENOUS CONTINUOUS PRN
Status: DISCONTINUED | OUTPATIENT
Start: 2025-07-31 | End: 2025-07-31

## 2025-07-31 RX ORDER — LIDOCAINE HYDROCHLORIDE 20 MG/ML
INJECTION, SOLUTION INFILTRATION; PERINEURAL PRN
Status: DISCONTINUED | OUTPATIENT
Start: 2025-07-31 | End: 2025-07-31

## 2025-07-31 RX ORDER — SODIUM CHLORIDE, SODIUM LACTATE, POTASSIUM CHLORIDE, CALCIUM CHLORIDE 600; 310; 30; 20 MG/100ML; MG/100ML; MG/100ML; MG/100ML
INJECTION, SOLUTION INTRAVENOUS CONTINUOUS PRN
Status: DISCONTINUED | OUTPATIENT
Start: 2025-07-31 | End: 2025-07-31

## 2025-07-31 RX ORDER — PANTOPRAZOLE SODIUM 40 MG/1
40 TABLET, DELAYED RELEASE ORAL DAILY
Qty: 30 TABLET | Refills: 3 | Status: SHIPPED | OUTPATIENT
Start: 2025-07-31

## 2025-07-31 RX ORDER — PROPOFOL 10 MG/ML
INJECTION, EMULSION INTRAVENOUS PRN
Status: DISCONTINUED | OUTPATIENT
Start: 2025-07-31 | End: 2025-07-31

## 2025-07-31 RX ORDER — IBUPROFEN 200 MG
800 TABLET ORAL EVERY 4 HOURS PRN
COMMUNITY
End: 2025-08-06

## 2025-07-31 RX ORDER — ONDANSETRON 2 MG/ML
INJECTION INTRAMUSCULAR; INTRAVENOUS PRN
Status: DISCONTINUED | OUTPATIENT
Start: 2025-07-31 | End: 2025-07-31

## 2025-07-31 RX ADMIN — PROPOFOL 150 MCG/KG/MIN: 10 INJECTION, EMULSION INTRAVENOUS at 09:57

## 2025-07-31 RX ADMIN — DEXMEDETOMIDINE HYDROCHLORIDE 8 MCG: 100 INJECTION, SOLUTION INTRAVENOUS at 10:01

## 2025-07-31 RX ADMIN — PROPOFOL 50 MG: 10 INJECTION, EMULSION INTRAVENOUS at 10:04

## 2025-07-31 RX ADMIN — PROPOFOL 30 MG: 10 INJECTION, EMULSION INTRAVENOUS at 10:05

## 2025-07-31 RX ADMIN — LIDOCAINE HYDROCHLORIDE 100 MG: 20 INJECTION, SOLUTION INFILTRATION; PERINEURAL at 09:57

## 2025-07-31 RX ADMIN — SODIUM CHLORIDE, SODIUM LACTATE, POTASSIUM CHLORIDE, AND CALCIUM CHLORIDE: .6; .31; .03; .02 INJECTION, SOLUTION INTRAVENOUS at 09:53

## 2025-07-31 RX ADMIN — DEXMEDETOMIDINE HYDROCHLORIDE 12 MCG: 100 INJECTION, SOLUTION INTRAVENOUS at 09:58

## 2025-07-31 RX ADMIN — DEXMEDETOMIDINE HYDROCHLORIDE 12 MCG: 100 INJECTION, SOLUTION INTRAVENOUS at 10:04

## 2025-07-31 RX ADMIN — DEXMEDETOMIDINE HYDROCHLORIDE 8 MCG: 100 INJECTION, SOLUTION INTRAVENOUS at 10:06

## 2025-07-31 RX ADMIN — ONDANSETRON 4 MG: 2 INJECTION INTRAMUSCULAR; INTRAVENOUS at 09:57

## 2025-07-31 ASSESSMENT — ENCOUNTER SYMPTOMS: DYSRHYTHMIAS: 1

## 2025-07-31 ASSESSMENT — ACTIVITIES OF DAILY LIVING (ADL)
ADLS_ACUITY_SCORE: 41

## 2025-07-31 NOTE — ANESTHESIA POSTPROCEDURE EVALUATION
Patient: Eliana Anders    Procedure: Procedure(s):  ESOPHAGOGASTRODUODENOSCOPY, WITH BIOPSY  COLONOSCOPY, FLEXIBLE, WITH LESION REMOVAL USING HOT SNARE       Anesthesia Type:  MAC    Note:  Disposition: Outpatient   Postop Pain Control: Uneventful            Sign Out: Well controlled pain   PONV: No   Neuro/Psych: Uneventful            Sign Out: Acceptable/Baseline neuro status   Airway/Respiratory: Uneventful            Sign Out: Acceptable/Baseline resp. status   CV/Hemodynamics: Uneventful            Sign Out: Acceptable CV status   Other NRE: NONE   DID A NON-ROUTINE EVENT OCCUR?            Last vitals:  Vitals Value Taken Time   /76 07/31/25 11:00   Temp     Pulse 79 07/31/25 11:01   Resp 24 07/31/25 11:01   SpO2 98 % 07/31/25 11:02   Vitals shown include unfiled device data.    Electronically Signed By: Jean Paul Ramos MD  July 31, 2025  1:24 PM
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
## 2025-07-31 NOTE — ANESTHESIA CARE TRANSFER NOTE
Patient: Eliana Anders    Procedure: Procedure(s):  ESOPHAGOGASTRODUODENOSCOPY, WITH BIOPSY  COLONOSCOPY, FLEXIBLE, WITH LESION REMOVAL USING HOT SNARE       Diagnosis: Iron deficiency anemia secondary to inadequate dietary iron intake [D50.8]  Diagnosis Additional Information: No value filed.    Anesthesia Type:   MAC     Note:    Oropharynx: oropharynx clear of all foreign objects and spontaneously breathing  Level of Consciousness: awake  Oxygen Supplementation: room air    Independent Airway: airway patency satisfactory and stable    Vital Signs Stable: post-procedure vital signs reviewed and stable  Report to RN Given: handoff report given  Patient transferred to: PACU    Handoff Report: Identifed the Patient, Identified the Reponsible Provider, Reviewed the pertinent medical history, Discussed the surgical course, Reviewed Intra-OP anesthesia mangement and issues during anesthesia, Set expectations for post-procedure period and Allowed opportunity for questions and acknowledgement of understanding      Vitals:  Vitals Value Taken Time   /77 07/31/25 10:20   Temp     Pulse 75 07/31/25 10:21   Resp 24 07/31/25 10:21   SpO2 91 % 07/31/25 10:21   Vitals shown include unfiled device data.    Electronically Signed By: CARISA Cho CRNA  July 31, 2025  10:23 AM

## 2025-08-01 LAB
PATH REPORT.COMMENTS IMP SPEC: NORMAL
PATH REPORT.COMMENTS IMP SPEC: NORMAL
PATH REPORT.FINAL DX SPEC: NORMAL
PATH REPORT.GROSS SPEC: NORMAL
PATH REPORT.MICROSCOPIC SPEC OTHER STN: NORMAL
PATH REPORT.RELEVANT HX SPEC: NORMAL
PHOTO IMAGE: NORMAL

## 2025-08-06 ENCOUNTER — ONCOLOGY VISIT (OUTPATIENT)
Dept: ONCOLOGY | Facility: CLINIC | Age: 45
End: 2025-08-06
Attending: INTERNAL MEDICINE
Payer: COMMERCIAL

## 2025-08-06 ENCOUNTER — APPOINTMENT (OUTPATIENT)
Dept: LAB | Facility: CLINIC | Age: 45
End: 2025-08-06
Attending: INTERNAL MEDICINE
Payer: COMMERCIAL

## 2025-08-06 VITALS
OXYGEN SATURATION: 97 % | TEMPERATURE: 98.8 F | WEIGHT: 293 LBS | RESPIRATION RATE: 18 BRPM | SYSTOLIC BLOOD PRESSURE: 156 MMHG | BODY MASS INDEX: 48.28 KG/M2 | HEART RATE: 89 BPM | DIASTOLIC BLOOD PRESSURE: 91 MMHG

## 2025-08-06 DIAGNOSIS — D50.8 IRON DEFICIENCY ANEMIA SECONDARY TO INADEQUATE DIETARY IRON INTAKE: ICD-10-CM

## 2025-08-06 LAB
BASOPHILS # BLD AUTO: 0 10E3/UL (ref 0–0.2)
BASOPHILS NFR BLD AUTO: 0 %
EOSINOPHIL # BLD AUTO: 0.1 10E3/UL (ref 0–0.7)
EOSINOPHIL NFR BLD AUTO: 1 %
ERYTHROCYTE [DISTWIDTH] IN BLOOD BY AUTOMATED COUNT: 15.4 % (ref 10–15)
FERRITIN SERPL-MCNC: 33 NG/ML (ref 6–175)
HCT VFR BLD AUTO: 38.4 % (ref 35–47)
HGB BLD-MCNC: 12.2 G/DL (ref 11.7–15.7)
IMM GRANULOCYTES # BLD: 0 10E3/UL
IMM GRANULOCYTES NFR BLD: 0 %
IRON BINDING CAPACITY (ROCHE): 369 UG/DL (ref 240–430)
IRON SATN MFR SERPL: 12 % (ref 15–46)
IRON SERPL-MCNC: 43 UG/DL (ref 37–145)
LYMPHOCYTES # BLD AUTO: 2.2 10E3/UL (ref 0.8–5.3)
LYMPHOCYTES NFR BLD AUTO: 32 %
MCH RBC QN AUTO: 28.7 PG (ref 26.5–33)
MCHC RBC AUTO-ENTMCNC: 31.8 G/DL (ref 31.5–36.5)
MCV RBC AUTO: 90 FL (ref 78–100)
MONOCYTES # BLD AUTO: 0.5 10E3/UL (ref 0–1.3)
MONOCYTES NFR BLD AUTO: 7 %
NEUTROPHILS # BLD AUTO: 4.2 10E3/UL (ref 1.6–8.3)
NEUTROPHILS NFR BLD AUTO: 60 %
NRBC # BLD AUTO: 0 10E3/UL
NRBC BLD AUTO-RTO: 0 /100
PLATELET # BLD AUTO: 295 10E3/UL (ref 150–450)
RBC # BLD AUTO: 4.25 10E6/UL (ref 3.8–5.2)
WBC # BLD AUTO: 7.1 10E3/UL (ref 4–11)

## 2025-08-06 PROCEDURE — 99213 OFFICE O/P EST LOW 20 MIN: CPT | Performed by: INTERNAL MEDICINE

## 2025-08-06 PROCEDURE — 83550 IRON BINDING TEST: CPT | Performed by: INTERNAL MEDICINE

## 2025-08-06 PROCEDURE — 36415 COLL VENOUS BLD VENIPUNCTURE: CPT | Performed by: INTERNAL MEDICINE

## 2025-08-06 PROCEDURE — 82728 ASSAY OF FERRITIN: CPT | Performed by: INTERNAL MEDICINE

## 2025-08-06 PROCEDURE — 85004 AUTOMATED DIFF WBC COUNT: CPT | Performed by: INTERNAL MEDICINE

## 2025-08-06 RX ORDER — FERROUS SULFATE 325(65) MG
325 TABLET ORAL
Qty: 90 TABLET | Refills: 0 | Status: SHIPPED | OUTPATIENT
Start: 2025-08-06

## 2025-08-06 ASSESSMENT — PAIN SCALES - GENERAL: PAINLEVEL_OUTOF10: NO PAIN (0)

## 2025-08-14 PROBLEM — D12.6 ADENOMATOUS POLYP OF COLON: Status: ACTIVE | Noted: 2025-08-14

## 2025-09-02 ENCOUNTER — TRANSFERRED RECORDS (OUTPATIENT)
Dept: HEALTH INFORMATION MANAGEMENT | Facility: CLINIC | Age: 45
End: 2025-09-02
Payer: COMMERCIAL

## 2025-09-03 ENCOUNTER — PATIENT OUTREACH (OUTPATIENT)
Dept: ONCOLOGY | Facility: CLINIC | Age: 45
End: 2025-09-03
Payer: COMMERCIAL

## (undated) DEVICE — SYR BULB IRRIG 50ML LATEX FREE 0035280

## (undated) DEVICE — LINEN GOWN X4 5410

## (undated) DEVICE — DECANTER TRANSFER DEVICE 2008S

## (undated) DEVICE — TUBING SUCTION MEDI-VAC SOFT 3/16"X20' N520A

## (undated) DEVICE — NDL COUNTER 20CT 31142493

## (undated) DEVICE — GLOVE PROTEXIS W/NEU-THERA 6.5  2D73TE65

## (undated) DEVICE — GLOVE PROTEXIS BLUE W/NEU-THERA 7.0  2D73EB70

## (undated) DEVICE — ESU ELEC NDL 1" COATED/INSULATED E1465

## (undated) DEVICE — SPONGE RAY-TEC 4X8" 7318

## (undated) DEVICE — PAD CHUX UNDERPAD 30X36" P3036C

## (undated) DEVICE — SUCTION MANIFOLD DORNOCH ULTRA CART UL-CL500

## (undated) DEVICE — SOL NACL 0.9% IRRIG 1000ML BOTTLE 2F7124

## (undated) DEVICE — CATH INTERMITTENT CLEAN-CATH FEMALE 14FR 6" VINYL LF 420614

## (undated) DEVICE — LINEN TOWEL PACK X5 5464

## (undated) DEVICE — ESU GROUND PAD UNIVERSAL W/O CORD

## (undated) DEVICE — STRAP KNEE/BODY 31143004

## (undated) DEVICE — COVER CAMERA IN-LIGHT DISP LT-C02

## (undated) DEVICE — NDL 25GA 1.5" 305127

## (undated) DEVICE — SOL WATER IRRIG 1000ML BOTTLE 2F7114

## (undated) DEVICE — SU VICRYL 4-0 PS-2 18" UND J496H

## (undated) DEVICE — Device

## (undated) DEVICE — SUCTION TIP YANKAUER W/O VENT K86

## (undated) DEVICE — ESU PENCIL W/HOLSTER E2350H

## (undated) RX ORDER — ONDANSETRON 2 MG/ML
INJECTION INTRAMUSCULAR; INTRAVENOUS
Status: DISPENSED
Start: 2019-08-08

## (undated) RX ORDER — LIDOCAINE HYDROCHLORIDE 20 MG/ML
INJECTION, SOLUTION EPIDURAL; INFILTRATION; INTRACAUDAL; PERINEURAL
Status: DISPENSED
Start: 2019-08-08

## (undated) RX ORDER — OXYCODONE HYDROCHLORIDE 5 MG/1
TABLET ORAL
Status: DISPENSED
Start: 2019-08-08

## (undated) RX ORDER — SCOLOPAMINE TRANSDERMAL SYSTEM 1 MG/1
PATCH, EXTENDED RELEASE TRANSDERMAL
Status: DISPENSED
Start: 2019-08-08

## (undated) RX ORDER — KETOROLAC TROMETHAMINE 30 MG/ML
INJECTION, SOLUTION INTRAMUSCULAR; INTRAVENOUS
Status: DISPENSED
Start: 2019-08-08

## (undated) RX ORDER — LIDOCAINE HYDROCHLORIDE 10 MG/ML
INJECTION, SOLUTION EPIDURAL; INFILTRATION; INTRACAUDAL; PERINEURAL
Status: DISPENSED
Start: 2023-07-18

## (undated) RX ORDER — FENTANYL CITRATE 50 UG/ML
INJECTION, SOLUTION INTRAMUSCULAR; INTRAVENOUS
Status: DISPENSED
Start: 2025-06-13

## (undated) RX ORDER — GLYCOPYRROLATE 0.2 MG/ML
INJECTION, SOLUTION INTRAMUSCULAR; INTRAVENOUS
Status: DISPENSED
Start: 2019-08-08

## (undated) RX ORDER — TRIAMCINOLONE ACETONIDE 40 MG/ML
INJECTION, SUSPENSION INTRA-ARTICULAR; INTRAMUSCULAR
Status: DISPENSED
Start: 2023-07-18

## (undated) RX ORDER — FENTANYL CITRATE 50 UG/ML
INJECTION, SOLUTION INTRAMUSCULAR; INTRAVENOUS
Status: DISPENSED
Start: 2019-08-08

## (undated) RX ORDER — PROPOFOL 10 MG/ML
INJECTION, EMULSION INTRAVENOUS
Status: DISPENSED
Start: 2019-08-08

## (undated) RX ORDER — LIDOCAINE HYDROCHLORIDE AND EPINEPHRINE 10; 10 MG/ML; UG/ML
INJECTION, SOLUTION INFILTRATION; PERINEURAL
Status: DISPENSED
Start: 2019-08-08

## (undated) RX ORDER — CEFAZOLIN SODIUM 2 G/100ML
INJECTION, SOLUTION INTRAVENOUS
Status: DISPENSED
Start: 2019-08-08